# Patient Record
Sex: FEMALE | Race: WHITE | Employment: FULL TIME | URBAN - NONMETROPOLITAN AREA
[De-identification: names, ages, dates, MRNs, and addresses within clinical notes are randomized per-mention and may not be internally consistent; named-entity substitution may affect disease eponyms.]

---

## 2017-04-08 DIAGNOSIS — D50.0 IRON DEFICIENCY ANEMIA DUE TO CHRONIC BLOOD LOSS: ICD-10-CM

## 2017-04-10 RX ORDER — ESOMEPRAZOLE MAGNESIUM 40 MG/1
CAPSULE, DELAYED RELEASE ORAL
Qty: 30 CAPSULE | Refills: 5 | Status: SHIPPED | OUTPATIENT
Start: 2017-04-10 | End: 2017-04-12 | Stop reason: SDUPTHER

## 2017-04-12 ENCOUNTER — OFFICE VISIT (OUTPATIENT)
Dept: FAMILY MEDICINE CLINIC | Age: 52
End: 2017-04-12
Payer: COMMERCIAL

## 2017-04-12 VITALS
BODY MASS INDEX: 28.7 KG/M2 | HEART RATE: 80 BPM | DIASTOLIC BLOOD PRESSURE: 78 MMHG | HEIGHT: 63 IN | SYSTOLIC BLOOD PRESSURE: 118 MMHG | WEIGHT: 162 LBS | RESPIRATION RATE: 16 BRPM

## 2017-04-12 DIAGNOSIS — K21.9 GASTROESOPHAGEAL REFLUX DISEASE WITHOUT ESOPHAGITIS: Primary | ICD-10-CM

## 2017-04-12 DIAGNOSIS — Z13.9 SCREENING FOR CONDITION: ICD-10-CM

## 2017-04-12 DIAGNOSIS — Z13.9 SCREENING: ICD-10-CM

## 2017-04-12 DIAGNOSIS — B00.1 HERPES SIMPLEX LABIALIS: ICD-10-CM

## 2017-04-12 DIAGNOSIS — D50.0 IRON DEFICIENCY ANEMIA DUE TO CHRONIC BLOOD LOSS: ICD-10-CM

## 2017-04-12 PROCEDURE — 99213 OFFICE O/P EST LOW 20 MIN: CPT | Performed by: PHYSICIAN ASSISTANT

## 2017-04-12 RX ORDER — ESOMEPRAZOLE MAGNESIUM 40 MG/1
CAPSULE, DELAYED RELEASE ORAL
Qty: 30 CAPSULE | Refills: 5 | Status: SHIPPED | OUTPATIENT
Start: 2017-04-12 | End: 2018-06-05

## 2017-04-12 ASSESSMENT — ENCOUNTER SYMPTOMS
GASTROINTESTINAL NEGATIVE: 1
RESPIRATORY NEGATIVE: 1

## 2017-04-25 ASSESSMENT — ENCOUNTER SYMPTOMS
CHEST TIGHTNESS: 0
NAUSEA: 0
DIARRHEA: 0
COUGH: 0
RHINORRHEA: 0
CONSTIPATION: 0
VOMITING: 0
SINUS PRESSURE: 0
SHORTNESS OF BREATH: 0
SORE THROAT: 0
WHEEZING: 0

## 2017-04-28 DIAGNOSIS — E78.5 HYPERLIPIDEMIA, UNSPECIFIED HYPERLIPIDEMIA TYPE: Primary | ICD-10-CM

## 2017-08-24 ENCOUNTER — HOSPITAL ENCOUNTER (OUTPATIENT)
Dept: BONE DENSITY | Age: 52
Discharge: HOME | End: 2017-08-24
Payer: COMMERCIAL

## 2017-08-24 ENCOUNTER — HOSPITAL ENCOUNTER (OUTPATIENT)
Dept: MAMMOGRAPHY | Age: 52
Discharge: HOME | End: 2017-08-24
Payer: COMMERCIAL

## 2017-08-24 DIAGNOSIS — Z13.9 SCREENING FOR CONDITION: ICD-10-CM

## 2017-08-24 PROCEDURE — G0202 SCR MAMMO BI INCL CAD: HCPCS

## 2017-08-24 PROCEDURE — 77080 DXA BONE DENSITY AXIAL: CPT

## 2018-05-01 ENCOUNTER — APPOINTMENT (OUTPATIENT)
Dept: GENERAL RADIOLOGY | Age: 53
End: 2018-05-01
Payer: COMMERCIAL

## 2018-05-01 ENCOUNTER — TELEPHONE (OUTPATIENT)
Dept: FAMILY MEDICINE CLINIC | Age: 53
End: 2018-05-01

## 2018-05-01 ENCOUNTER — HOSPITAL ENCOUNTER (EMERGENCY)
Age: 53
Discharge: HOME | End: 2018-05-01
Attending: EMERGENCY MEDICINE
Payer: COMMERCIAL

## 2018-05-01 VITALS
WEIGHT: 155 LBS | RESPIRATION RATE: 12 BRPM | SYSTOLIC BLOOD PRESSURE: 155 MMHG | DIASTOLIC BLOOD PRESSURE: 72 MMHG | BODY MASS INDEX: 27.46 KG/M2 | TEMPERATURE: 98.2 F | OXYGEN SATURATION: 98 % | HEART RATE: 75 BPM

## 2018-05-01 DIAGNOSIS — R00.2 PALPITATIONS: Primary | ICD-10-CM

## 2018-05-01 LAB
ABSOLUTE EOS #: 0.2 K/UL (ref 0–0.4)
ABSOLUTE IMMATURE GRANULOCYTE: NORMAL K/UL (ref 0–0.3)
ABSOLUTE LYMPH #: 1.5 K/UL (ref 1–4.8)
ABSOLUTE MONO #: 0.3 K/UL (ref 0.1–1.2)
ANION GAP SERPL CALCULATED.3IONS-SCNC: 13 MMOL/L (ref 9–17)
BASOPHILS # BLD: 0 % (ref 0–1)
BASOPHILS ABSOLUTE: 0 K/UL (ref 0–0.2)
BUN BLDV-MCNC: 14 MG/DL (ref 6–20)
BUN/CREAT BLD: 24 (ref 9–20)
CALCIUM SERPL-MCNC: 10.6 MG/DL (ref 8.6–10.4)
CHLORIDE BLD-SCNC: 99 MMOL/L (ref 98–107)
CO2: 28 MMOL/L (ref 20–31)
CREAT SERPL-MCNC: 0.58 MG/DL (ref 0.5–0.9)
D DIMER: <100 NG/ML
DIFFERENTIAL TYPE: NORMAL
EKG ATRIAL RATE: 66 BPM
EKG P AXIS: 67 DEGREES
EKG P-R INTERVAL: 156 MS
EKG Q-T INTERVAL: 426 MS
EKG QRS DURATION: 98 MS
EKG QTC CALCULATION (BAZETT): 446 MS
EKG R AXIS: 16 DEGREES
EKG T AXIS: 40 DEGREES
EKG VENTRICULAR RATE: 66 BPM
EOSINOPHILS RELATIVE PERCENT: 3 % (ref 1–7)
GFR AFRICAN AMERICAN: >60 ML/MIN
GFR NON-AFRICAN AMERICAN: >60 ML/MIN
GFR SERPL CREATININE-BSD FRML MDRD: ABNORMAL ML/MIN/{1.73_M2}
GFR SERPL CREATININE-BSD FRML MDRD: ABNORMAL ML/MIN/{1.73_M2}
GLUCOSE BLD-MCNC: 94 MG/DL (ref 70–99)
HCT VFR BLD CALC: 44.7 % (ref 36–46)
HEMOGLOBIN: 15.1 G/DL (ref 12–16)
IMMATURE GRANULOCYTES: NORMAL %
INR BLD: 1
LYMPHOCYTES # BLD: 25 % (ref 16–46)
MCH RBC QN AUTO: 31.1 PG (ref 26–34)
MCHC RBC AUTO-ENTMCNC: 33.8 G/DL (ref 31–37)
MCV RBC AUTO: 92 FL (ref 80–100)
MONOCYTES # BLD: 6 % (ref 4–11)
MYOGLOBIN: <21 NG/ML (ref 25–58)
NRBC AUTOMATED: NORMAL PER 100 WBC
PDW BLD-RTO: 13.5 % (ref 11–14.5)
PLATELET # BLD: 173 K/UL (ref 140–450)
PLATELET ESTIMATE: NORMAL
PMV BLD AUTO: 9.7 FL (ref 6–12)
POTASSIUM SERPL-SCNC: 3.6 MMOL/L (ref 3.7–5.3)
PROTHROMBIN TIME: 10.3 SEC (ref 9.4–11.3)
RBC # BLD: 4.86 M/UL (ref 4–5.2)
RBC # BLD: NORMAL 10*6/UL
SEG NEUTROPHILS: 66 % (ref 43–77)
SEGMENTED NEUTROPHILS ABSOLUTE COUNT: 4 K/UL (ref 1.8–7.7)
SODIUM BLD-SCNC: 140 MMOL/L (ref 135–144)
TROPONIN INTERP: NORMAL
TROPONIN T: <0.03 NG/ML
TSH SERPL DL<=0.05 MIU/L-ACNC: 1.54 MIU/L (ref 0.3–5)
WBC # BLD: 6.1 K/UL (ref 3.5–11)
WBC # BLD: NORMAL 10*3/UL

## 2018-05-01 PROCEDURE — 85025 COMPLETE CBC W/AUTO DIFF WBC: CPT

## 2018-05-01 PROCEDURE — 71046 X-RAY EXAM CHEST 2 VIEWS: CPT

## 2018-05-01 PROCEDURE — 84484 ASSAY OF TROPONIN QUANT: CPT

## 2018-05-01 PROCEDURE — 93005 ELECTROCARDIOGRAM TRACING: CPT

## 2018-05-01 PROCEDURE — 85610 PROTHROMBIN TIME: CPT

## 2018-05-01 PROCEDURE — 85379 FIBRIN DEGRADATION QUANT: CPT

## 2018-05-01 PROCEDURE — 83874 ASSAY OF MYOGLOBIN: CPT

## 2018-05-01 PROCEDURE — 99285 EMERGENCY DEPT VISIT HI MDM: CPT

## 2018-05-01 PROCEDURE — 80048 BASIC METABOLIC PNL TOTAL CA: CPT

## 2018-05-01 PROCEDURE — 84443 ASSAY THYROID STIM HORMONE: CPT

## 2018-05-01 ASSESSMENT — ENCOUNTER SYMPTOMS
CONSTIPATION: 0
COUGH: 0
BACK PAIN: 0
DIARRHEA: 0
BLOOD IN STOOL: 0
SHORTNESS OF BREATH: 0
ABDOMINAL PAIN: 0
NAUSEA: 0
VOMITING: 0
EYE PAIN: 0

## 2018-06-05 ENCOUNTER — OFFICE VISIT (OUTPATIENT)
Dept: CARDIOLOGY | Age: 53
End: 2018-06-05
Payer: COMMERCIAL

## 2018-06-05 VITALS
HEART RATE: 80 BPM | HEIGHT: 63 IN | WEIGHT: 155 LBS | DIASTOLIC BLOOD PRESSURE: 80 MMHG | SYSTOLIC BLOOD PRESSURE: 120 MMHG | BODY MASS INDEX: 27.46 KG/M2

## 2018-06-05 DIAGNOSIS — R00.2 PALPITATIONS: Primary | ICD-10-CM

## 2018-06-05 PROCEDURE — 99213 OFFICE O/P EST LOW 20 MIN: CPT | Performed by: INTERNAL MEDICINE

## 2018-06-05 RX ORDER — M-VIT,TX,IRON,MINS/CALC/FOLIC 27MG-0.4MG
1 TABLET ORAL DAILY
COMMUNITY
End: 2022-07-20

## 2018-06-26 ENCOUNTER — HOSPITAL ENCOUNTER (OUTPATIENT)
Dept: NON INVASIVE DIAGNOSTICS | Age: 53
Discharge: HOME | End: 2018-06-26
Payer: COMMERCIAL

## 2018-06-26 DIAGNOSIS — R00.2 PALPITATIONS: ICD-10-CM

## 2018-06-26 PROCEDURE — 93225 XTRNL ECG REC<48 HRS REC: CPT

## 2018-06-26 PROCEDURE — 93226 XTRNL ECG REC<48 HR SCAN A/R: CPT

## 2018-06-28 ENCOUNTER — HOSPITAL ENCOUNTER (OUTPATIENT)
Dept: NON INVASIVE DIAGNOSTICS | Age: 53
Discharge: HOME | End: 2018-06-28
Payer: COMMERCIAL

## 2018-08-15 ENCOUNTER — OFFICE VISIT (OUTPATIENT)
Dept: CARDIOLOGY | Age: 53
End: 2018-08-15
Payer: COMMERCIAL

## 2018-08-15 VITALS
DIASTOLIC BLOOD PRESSURE: 80 MMHG | BODY MASS INDEX: 28 KG/M2 | WEIGHT: 158 LBS | HEART RATE: 88 BPM | HEIGHT: 63 IN | SYSTOLIC BLOOD PRESSURE: 124 MMHG

## 2018-08-15 DIAGNOSIS — R00.2 PALPITATIONS: Primary | ICD-10-CM

## 2018-08-15 PROCEDURE — 99212 OFFICE O/P EST SF 10 MIN: CPT | Performed by: INTERNAL MEDICINE

## 2018-08-15 NOTE — PROGRESS NOTES
04/27/2017    [address] 136 12/07/2015    TRIG 70 04/27/2017       IMPRESSION:    Patient Active Problem List   Diagnosis    Acid reflux    Hx of migraine headaches    Menorrhagia     48 Hour Holter monitor:  NSR with sinus tachycardia and sinus bradycardia. Rare PACs and rare PVCs. Assessment / Acute Cardiac Problems:     1-Palpitations with negative Holter monitor as above  2-No risk factors for CAD  3-Excellent functional capacity exercises regularely  4-Normal Echo in the past.    Plan of Treatment:     1-No need to add BB since her symptoms have improved. 2-Continue diet and exercise. 3-Reduce stress  4-F/U on an annual basis.       Electronically signed by Kena Blanco MD on 8/15/2018 at 11:37 [facility]  [phone]

## 2018-08-16 ENCOUNTER — OFFICE VISIT (OUTPATIENT)
Dept: PRIMARY CARE CLINIC | Age: 53
End: 2018-08-16
Payer: COMMERCIAL

## 2018-08-16 ENCOUNTER — HOSPITAL ENCOUNTER (OUTPATIENT)
Age: 53
Setting detail: SPECIMEN
Discharge: HOME | End: 2018-08-16
Payer: COMMERCIAL

## 2018-08-16 VITALS
HEIGHT: 63 IN | HEART RATE: 64 BPM | DIASTOLIC BLOOD PRESSURE: 80 MMHG | TEMPERATURE: 97.9 F | BODY MASS INDEX: 27.46 KG/M2 | OXYGEN SATURATION: 98 % | SYSTOLIC BLOOD PRESSURE: 120 MMHG | WEIGHT: 155 LBS

## 2018-08-16 DIAGNOSIS — R30.0 DYSURIA: Primary | ICD-10-CM

## 2018-08-16 DIAGNOSIS — N39.0 URINARY TRACT INFECTION WITHOUT HEMATURIA, SITE UNSPECIFIED: ICD-10-CM

## 2018-08-16 DIAGNOSIS — R30.0 DYSURIA: ICD-10-CM

## 2018-08-16 LAB
-: ABNORMAL
AMORPHOUS: ABNORMAL
BACTERIA: ABNORMAL
BILIRUBIN URINE: NEGATIVE
CASTS UA: ABNORMAL /LPF (ref 0–2)
COLOR: ABNORMAL
COMMENT UA: ABNORMAL
CRYSTALS, UA: ABNORMAL /HPF
EPITHELIAL CELLS UA: ABNORMAL /HPF (ref 0–5)
GLUCOSE URINE: ABNORMAL
KETONES, URINE: NEGATIVE
LEUKOCYTE ESTERASE, URINE: ABNORMAL
MUCUS: ABNORMAL
NITRITE, URINE: POSITIVE
OTHER OBSERVATIONS UA: ABNORMAL
PH UA: 6.5 (ref 5–6)
PROTEIN UA: NEGATIVE
RBC UA: ABNORMAL /HPF (ref 0–4)
RENAL EPITHELIAL, UA: ABNORMAL /HPF
SPECIFIC GRAVITY UA: 1.01 (ref 1.01–1.02)
TRICHOMONAS: ABNORMAL
TURBIDITY: ABNORMAL
URINE HGB: NEGATIVE
UROBILINOGEN, URINE: NORMAL
WBC UA: ABNORMAL /HPF (ref 0–4)
YEAST: ABNORMAL

## 2018-08-16 PROCEDURE — 81001 URINALYSIS AUTO W/SCOPE: CPT

## 2018-08-16 PROCEDURE — 99213 OFFICE O/P EST LOW 20 MIN: CPT | Performed by: NURSE PRACTITIONER

## 2018-08-16 PROCEDURE — 87086 URINE CULTURE/COLONY COUNT: CPT

## 2018-08-16 RX ORDER — SULFAMETHOXAZOLE AND TRIMETHOPRIM 800; 160 MG/1; MG/1
1 TABLET ORAL 2 TIMES DAILY
Qty: 20 TABLET | Refills: 0 | Status: SHIPPED | OUTPATIENT
Start: 2018-08-16 | End: 2018-08-23

## 2018-08-16 ASSESSMENT — PATIENT HEALTH QUESTIONNAIRE - PHQ9
SUM OF ALL RESPONSES TO PHQ QUESTIONS 1-9: 0
1. LITTLE INTEREST OR PLEASURE IN DOING THINGS: 0
SUM OF ALL RESPONSES TO PHQ9 QUESTIONS 1 & 2: 0
2. FEELING DOWN, DEPRESSED OR HOPELESS: 0
SUM OF ALL RESPONSES TO PHQ QUESTIONS 1-9: 0

## 2018-08-16 ASSESSMENT — ENCOUNTER SYMPTOMS: RESPIRATORY NEGATIVE: 1

## 2018-08-16 NOTE — PATIENT INSTRUCTIONS
Patient Education        Urinary Tract Infection in Women: Care Instructions  Your Care Instructions    A urinary tract infection, or UTI, is a general term for an infection anywhere between the kidneys and the urethra (where urine comes out). Most UTIs are bladder infections. They often cause pain or burning when you urinate. UTIs are caused by bacteria and can be cured with antibiotics. Be sure to complete your treatment so that the infection goes away. Follow-up care is a key part of your treatment and safety. Be sure to make and go to all appointments, and call your doctor if you are having problems. It's also a good idea to know your test results and keep a list of the medicines you take. How can you care for yourself at home? · Take your antibiotics as directed. Do not stop taking them just because you feel better. You need to take the full course of antibiotics. · Drink extra water and other fluids for the next day or two. This may help wash out the bacteria that are causing the infection. (If you have kidney, heart, or liver disease and have to limit fluids, talk with your doctor before you increase your fluid intake.)  · Avoid drinks that are carbonated or have caffeine. They can irritate the bladder. · Urinate often. Try to empty your bladder each time. · To relieve pain, take a hot bath or lay a heating pad set on low over your lower belly or genital area. Never go to sleep with a heating pad in place. To prevent UTIs  · Drink plenty of water each day. This helps you urinate often, which clears bacteria from your system. (If you have kidney, heart, or liver disease and have to limit fluids, talk with your doctor before you increase your fluid intake.)  · Urinate when you need to. · Urinate right after you have sex. · Change sanitary pads often. · Avoid douches, bubble baths, feminine hygiene sprays, and other feminine hygiene products that have deodorants.   · After going to the bathroom, wipe from front to back. When should you call for help? Call your doctor now or seek immediate medical care if:    · Symptoms such as fever, chills, nausea, or vomiting get worse or appear for the first time.     · You have new pain in your back just below your rib cage. This is called flank pain.     · There is new blood or pus in your urine.     · You have any problems with your antibiotic medicine.    Watch closely for changes in your health, and be sure to contact your doctor if:    · You are not getting better after taking an antibiotic for 2 days.     · Your symptoms go away but then come back. Where can you learn more? Go to https://Star AnalyticspeSinbad's supply chaineb.UQM Technologies. org and sign in to your UniQure account. Enter G069 in the [employer] box to learn more about \"Urinary Tract Infection in Women: Care Instructions. \"     If you do not have an account, please click on the \"Sign Up Now\" link. Current as of: May 12, 2017  Content Version: 11.7  © 6525-4351 [employer]. Care instructions adapted under license by [facility] ([facility]). If you have questions about a medical condition or this instruction, always ask your healthcare professional. [place] [address] any warranty or liability for your use of this information.

## 2018-08-16 NOTE — PROGRESS NOTES
0.70 04/27/2017    ALKPHOS 94 04/27/2017    AST 17 04/27/2017    ALT 13 04/27/2017    LABGLOM >60 05/01/2018    GFRAA >60 05/01/2018       Outpatient Encounter Prescriptions as of 8/16/2018   Medication Sig Dispense Refill    sulfamethoxazole-trimethoprim (BACTRIM DS) 800-160 MG per tablet Take 1 tablet by mouth 2 times daily for 7 days 20 tablet 0    Multiple Vitamins-Minerals (THERAPEUTIC MULTIVITAMIN-MINERALS) tablet Take 1 tablet by mouth daily      Biotin w/ Vitamins C & E (HAIR/SKIN/NAILS PO) Take by mouth daily       No facility-administered encounter medications on file as of 8/16/2018. Review of Systems   Constitutional: Negative. HENT: Negative. Respiratory: Negative. Cardiovascular: Negative. Genitourinary: Positive for dysuria. Negative for flank pain and hematuria. Skin: Negative. Neurological: Negative. Endo/Heme/Allergies: Negative. Physical Exam   Constitutional: She is oriented to person, place, and time. She appears well-developed and well-nourished. Neck: Normal range of motion. No thyromegaly present. Cardiovascular: Normal rate, regular rhythm and normal heart sounds. Pulmonary/Chest: Effort normal and breath sounds normal.   Abdominal: Soft. There is no tenderness. Musculoskeletal: Normal range of motion. Neurological: She is alert and oriented to person, place, and time. Skin: Skin is warm and dry. Psychiatric: She has a normal mood and affect. no flank pain    Data reviewed:      ASSESSMENT:   Diagnosis Orders   1. Dysuria  Urinalysis Reflex to Culture   2. Urinary tract infection without hematuria, site unspecified         PLAN:  Return if symptoms worsen or fail to improve.     Orders Placed This Encounter   Medications    sulfamethoxazole-trimethoprim (BACTRIM DS) 800-160 MG per tablet     Sig: Take 1 tablet by mouth 2 times daily for 7 days     Dispense:  20 tablet     Refill:  0     Patient given educational materials - see patient instructions. Discussed use, benefit, and side effects of prescribed medications. All patient questions answered. Pt voiced understanding. Patient agreed with treatment plan. Follow up as directed.        Electronically signed by Jeraldine Halsted, APRN - CNP on 8/16/18 at 9:50 AM

## 2018-08-17 LAB
CULTURE: NORMAL
Lab: NORMAL
SPECIMEN DESCRIPTION: NORMAL
STATUS: NORMAL

## 2018-08-18 ENCOUNTER — TELEPHONE (OUTPATIENT)
Dept: PRIMARY CARE CLINIC | Age: 53
End: 2018-08-18

## 2018-08-18 NOTE — TELEPHONE ENCOUNTER
Left voicemail to continue bactrim, increase water intake, and follow up with pcp as needed. Gave phone number for return call/questions.

## 2019-01-03 ENCOUNTER — HOSPITAL ENCOUNTER (OUTPATIENT)
Dept: GENERAL RADIOLOGY | Age: 54
Discharge: HOME | End: 2019-01-05
Payer: COMMERCIAL

## 2019-01-03 ENCOUNTER — HOSPITAL ENCOUNTER (OUTPATIENT)
Dept: LAB | Age: 54
Discharge: HOME | End: 2019-01-03
Payer: COMMERCIAL

## 2019-01-03 ENCOUNTER — OFFICE VISIT (OUTPATIENT)
Dept: FAMILY MEDICINE CLINIC | Age: 54
End: 2019-01-03
Payer: COMMERCIAL

## 2019-01-03 VITALS
BODY MASS INDEX: 28.7 KG/M2 | SYSTOLIC BLOOD PRESSURE: 118 MMHG | HEART RATE: 98 BPM | WEIGHT: 162 LBS | OXYGEN SATURATION: 99 % | HEIGHT: 63 IN | DIASTOLIC BLOOD PRESSURE: 78 MMHG

## 2019-01-03 DIAGNOSIS — R10.11 RUQ ABDOMINAL PAIN: ICD-10-CM

## 2019-01-03 DIAGNOSIS — R10.11 RUQ ABDOMINAL PAIN: Primary | ICD-10-CM

## 2019-01-03 LAB
ABSOLUTE EOS #: 0.2 K/UL (ref 0–0.4)
ABSOLUTE IMMATURE GRANULOCYTE: NORMAL K/UL (ref 0–0.3)
ABSOLUTE LYMPH #: 2 K/UL (ref 1–4.8)
ABSOLUTE MONO #: 0.4 K/UL (ref 0.1–1.2)
ALBUMIN SERPL-MCNC: 4.8 G/DL (ref 3.5–5.2)
ALBUMIN/GLOBULIN RATIO: 1.9 (ref 1–2.5)
ALP BLD-CCNC: 77 U/L (ref 35–104)
ALT SERPL-CCNC: 18 U/L (ref 5–33)
AMYLASE: 73 U/L (ref 28–100)
ANION GAP SERPL CALCULATED.3IONS-SCNC: 12 MMOL/L (ref 9–17)
AST SERPL-CCNC: 21 U/L
BASOPHILS # BLD: 1 % (ref 0–1)
BASOPHILS ABSOLUTE: 0 K/UL (ref 0–0.2)
BILIRUB SERPL-MCNC: 0.45 MG/DL (ref 0.3–1.2)
BUN BLDV-MCNC: 11 MG/DL (ref 6–20)
BUN/CREAT BLD: 18 (ref 9–20)
CALCIUM SERPL-MCNC: 9.9 MG/DL (ref 8.6–10.4)
CHLORIDE BLD-SCNC: 103 MMOL/L (ref 98–107)
CO2: 27 MMOL/L (ref 20–31)
CREAT SERPL-MCNC: 0.6 MG/DL (ref 0.5–0.9)
DIFFERENTIAL TYPE: NORMAL
EOSINOPHILS RELATIVE PERCENT: 4 % (ref 1–7)
GFR AFRICAN AMERICAN: >60 ML/MIN
GFR NON-AFRICAN AMERICAN: >60 ML/MIN
GFR SERPL CREATININE-BSD FRML MDRD: NORMAL ML/MIN/{1.73_M2}
GFR SERPL CREATININE-BSD FRML MDRD: NORMAL ML/MIN/{1.73_M2}
GLUCOSE BLD-MCNC: 83 MG/DL (ref 70–99)
HCT VFR BLD CALC: 44.4 % (ref 36–46)
HEMOGLOBIN: 14.7 G/DL (ref 12–16)
IMMATURE GRANULOCYTES: NORMAL %
LIPASE: 55 U/L (ref 13–60)
LYMPHOCYTES # BLD: 36 % (ref 16–46)
MCH RBC QN AUTO: 30.9 PG (ref 26–34)
MCHC RBC AUTO-ENTMCNC: 33.2 G/DL (ref 31–37)
MCV RBC AUTO: 93.3 FL (ref 80–100)
MONOCYTES # BLD: 7 % (ref 4–11)
NRBC AUTOMATED: NORMAL PER 100 WBC
PDW BLD-RTO: 13.1 % (ref 11–14.5)
PLATELET # BLD: 176 K/UL (ref 140–450)
PLATELET ESTIMATE: NORMAL
PMV BLD AUTO: 10.4 FL (ref 6–12)
POTASSIUM SERPL-SCNC: 4.5 MMOL/L (ref 3.7–5.3)
RBC # BLD: 4.76 M/UL (ref 4–5.2)
RBC # BLD: NORMAL 10*6/UL
SEG NEUTROPHILS: 52 % (ref 43–77)
SEGMENTED NEUTROPHILS ABSOLUTE COUNT: 2.9 K/UL (ref 1.8–7.7)
SODIUM BLD-SCNC: 142 MMOL/L (ref 135–144)
TOTAL PROTEIN: 7.3 G/DL (ref 6.4–8.3)
WBC # BLD: 5.6 K/UL (ref 3.5–11)
WBC # BLD: NORMAL 10*3/UL

## 2019-01-03 PROCEDURE — 85025 COMPLETE CBC W/AUTO DIFF WBC: CPT

## 2019-01-03 PROCEDURE — 99214 OFFICE O/P EST MOD 30 MIN: CPT | Performed by: PHYSICIAN ASSISTANT

## 2019-01-03 PROCEDURE — 82150 ASSAY OF AMYLASE: CPT

## 2019-01-03 PROCEDURE — 36415 COLL VENOUS BLD VENIPUNCTURE: CPT

## 2019-01-03 PROCEDURE — 83690 ASSAY OF LIPASE: CPT

## 2019-01-03 PROCEDURE — 80053 COMPREHEN METABOLIC PANEL: CPT

## 2019-01-03 PROCEDURE — 74019 RADEX ABDOMEN 2 VIEWS: CPT

## 2019-01-03 ASSESSMENT — ENCOUNTER SYMPTOMS
VOMITING: 0
ABDOMINAL PAIN: 1
ABDOMINAL DISTENTION: 1
BACK PAIN: 1
BLOOD IN STOOL: 0
RESPIRATORY NEGATIVE: 1
NAUSEA: 1
DIARRHEA: 0
CONSTIPATION: 1

## 2019-01-14 ENCOUNTER — OFFICE VISIT (OUTPATIENT)
Dept: FAMILY MEDICINE CLINIC | Age: 54
End: 2019-01-14
Payer: COMMERCIAL

## 2019-01-14 VITALS
HEIGHT: 63 IN | WEIGHT: 160 LBS | SYSTOLIC BLOOD PRESSURE: 110 MMHG | HEART RATE: 73 BPM | OXYGEN SATURATION: 98 % | BODY MASS INDEX: 28.35 KG/M2 | DIASTOLIC BLOOD PRESSURE: 70 MMHG

## 2019-01-14 DIAGNOSIS — K21.9 GASTROESOPHAGEAL REFLUX DISEASE WITHOUT ESOPHAGITIS: ICD-10-CM

## 2019-01-14 DIAGNOSIS — R05.9 COUGH: Primary | ICD-10-CM

## 2019-01-14 DIAGNOSIS — J06.9 VIRAL URI: ICD-10-CM

## 2019-01-14 LAB
INFLUENZA A ANTIBODY: NORMAL
INFLUENZA B ANTIBODY: NORMAL

## 2019-01-14 PROCEDURE — 87804 INFLUENZA ASSAY W/OPTIC: CPT | Performed by: PHYSICIAN ASSISTANT

## 2019-01-14 PROCEDURE — 99213 OFFICE O/P EST LOW 20 MIN: CPT | Performed by: PHYSICIAN ASSISTANT

## 2019-01-14 RX ORDER — BENZONATATE 200 MG/1
200 CAPSULE ORAL 3 TIMES DAILY PRN
Qty: 30 CAPSULE | Refills: 1 | Status: SHIPPED | OUTPATIENT
Start: 2019-01-14 | End: 2019-01-21

## 2019-01-14 RX ORDER — ESOMEPRAZOLE MAGNESIUM 40 MG/1
40 CAPSULE, DELAYED RELEASE ORAL DAILY
Qty: 30 CAPSULE | Refills: 2 | Status: SHIPPED | OUTPATIENT
Start: 2019-01-14 | End: 2019-06-22 | Stop reason: SDUPTHER

## 2019-01-14 ASSESSMENT — ENCOUNTER SYMPTOMS
CHEST TIGHTNESS: 0
GASTROINTESTINAL NEGATIVE: 1
RHINORRHEA: 1
SORE THROAT: 0
WHEEZING: 0
SINUS PAIN: 0
COUGH: 1
SINUS PRESSURE: 0

## 2019-02-06 ENCOUNTER — HOSPITAL ENCOUNTER (OUTPATIENT)
Age: 54
Setting detail: SPECIMEN
Discharge: HOME | End: 2019-02-06
Payer: COMMERCIAL

## 2019-02-06 ENCOUNTER — OFFICE VISIT (OUTPATIENT)
Dept: FAMILY MEDICINE CLINIC | Age: 54
End: 2019-02-06
Payer: COMMERCIAL

## 2019-02-06 VITALS
SYSTOLIC BLOOD PRESSURE: 116 MMHG | DIASTOLIC BLOOD PRESSURE: 74 MMHG | WEIGHT: 165.8 LBS | HEIGHT: 63 IN | HEART RATE: 76 BPM | OXYGEN SATURATION: 98 % | BODY MASS INDEX: 29.38 KG/M2

## 2019-02-06 DIAGNOSIS — Z12.4 PAP SMEAR FOR CERVICAL CANCER SCREENING: ICD-10-CM

## 2019-02-06 DIAGNOSIS — Z12.11 SCREEN FOR COLON CANCER: ICD-10-CM

## 2019-02-06 DIAGNOSIS — K21.9 GASTROESOPHAGEAL REFLUX DISEASE WITHOUT ESOPHAGITIS: ICD-10-CM

## 2019-02-06 DIAGNOSIS — Z13.9 SCREENING FOR CONDITION: Primary | ICD-10-CM

## 2019-02-06 DIAGNOSIS — E55.9 VITAMIN D DEFICIENCY: ICD-10-CM

## 2019-02-06 DIAGNOSIS — E78.00 PURE HYPERCHOLESTEROLEMIA: ICD-10-CM

## 2019-02-06 PROCEDURE — 87624 HPV HI-RISK TYP POOLED RSLT: CPT

## 2019-02-06 PROCEDURE — 99396 PREV VISIT EST AGE 40-64: CPT | Performed by: PHYSICIAN ASSISTANT

## 2019-02-06 PROCEDURE — G0145 SCR C/V CYTO,THINLAYER,RESCR: HCPCS

## 2019-02-06 ASSESSMENT — PATIENT HEALTH QUESTIONNAIRE - PHQ9
SUM OF ALL RESPONSES TO PHQ QUESTIONS 1-9: 0
SUM OF ALL RESPONSES TO PHQ QUESTIONS 1-9: 0
1. LITTLE INTEREST OR PLEASURE IN DOING THINGS: 0
2. FEELING DOWN, DEPRESSED OR HOPELESS: 0
SUM OF ALL RESPONSES TO PHQ9 QUESTIONS 1 & 2: 0

## 2019-02-06 ASSESSMENT — ENCOUNTER SYMPTOMS
RESPIRATORY NEGATIVE: 1
BLOOD IN STOOL: 0
ABDOMINAL DISTENTION: 1

## 2019-02-07 LAB — COMMENT: NORMAL

## 2019-02-08 LAB
HPV SAMPLE: NORMAL
HPV SOURCE: NORMAL
HPV, GENOTYPE 16: NOT DETECTED
HPV, GENOTYPE 18: NOT DETECTED
HPV, HIGH RISK OTHER: NOT DETECTED
HPV, INTERPRETATION: NORMAL

## 2019-02-09 ASSESSMENT — ENCOUNTER SYMPTOMS
COUGH: 0
NAUSEA: 0
TROUBLE SWALLOWING: 0
DIARRHEA: 0
CHEST TIGHTNESS: 0
SHORTNESS OF BREATH: 0
SORE THROAT: 0
CONSTIPATION: 0
SINUS PAIN: 0
WHEEZING: 0
SINUS PRESSURE: 0
RHINORRHEA: 0
VOMITING: 0

## 2019-02-14 ENCOUNTER — HOSPITAL ENCOUNTER (OUTPATIENT)
Dept: MAMMOGRAPHY | Age: 54
Discharge: HOME | End: 2019-02-16
Payer: COMMERCIAL

## 2019-02-14 DIAGNOSIS — Z13.9 SCREENING FOR CONDITION: ICD-10-CM

## 2019-02-14 PROCEDURE — 77063 BREAST TOMOSYNTHESIS BI: CPT

## 2019-02-19 ENCOUNTER — TELEPHONE (OUTPATIENT)
Dept: FAMILY MEDICINE CLINIC | Age: 54
End: 2019-02-19

## 2019-02-19 LAB — CYTOLOGY REPORT: NORMAL

## 2019-02-19 NOTE — TELEPHONE ENCOUNTER
Spoke with patient and she is aware of her HPV results and that we will call her when the final pap results are completed.

## 2019-04-04 ENCOUNTER — PATIENT MESSAGE (OUTPATIENT)
Dept: FAMILY MEDICINE CLINIC | Age: 54
End: 2019-04-04

## 2019-04-04 NOTE — TELEPHONE ENCOUNTER
From: Milvia Reynolds  To: MALACHI Pearce  Sent: 4/4/2019 12:41 PM EDT  Subject: Non-Urgent Medical Question    Hello! Hope this email finds you well :)  I am finishing up the generic Nexium this week, as suggested. That would make three months. Just wanted to let you know. Also, I am still doing the Mylax once daily. I stopped once for three days and it was very hard to go, so I am continuing with it. It's been helpful, but I wonder if I need to do this forever and why I need to do this, know what I mean? What is causing this? Suggestions?

## 2019-06-22 DIAGNOSIS — K21.9 GASTROESOPHAGEAL REFLUX DISEASE WITHOUT ESOPHAGITIS: ICD-10-CM

## 2019-06-24 RX ORDER — ESOMEPRAZOLE MAGNESIUM 40 MG/1
CAPSULE, DELAYED RELEASE ORAL
Qty: 30 CAPSULE | Refills: 2 | Status: SHIPPED | OUTPATIENT
Start: 2019-06-24 | End: 2019-08-05 | Stop reason: SDUPTHER

## 2019-08-05 ENCOUNTER — OFFICE VISIT (OUTPATIENT)
Dept: FAMILY MEDICINE CLINIC | Age: 54
End: 2019-08-05
Payer: COMMERCIAL

## 2019-08-05 VITALS
SYSTOLIC BLOOD PRESSURE: 118 MMHG | HEART RATE: 78 BPM | WEIGHT: 161 LBS | BODY MASS INDEX: 28.53 KG/M2 | OXYGEN SATURATION: 97 % | HEIGHT: 63 IN | DIASTOLIC BLOOD PRESSURE: 78 MMHG

## 2019-08-05 DIAGNOSIS — Z78.0 MENOPAUSE: ICD-10-CM

## 2019-08-05 DIAGNOSIS — K21.9 GASTROESOPHAGEAL REFLUX DISEASE WITHOUT ESOPHAGITIS: ICD-10-CM

## 2019-08-05 DIAGNOSIS — Z23 NEED FOR SHINGLES VACCINE: Primary | ICD-10-CM

## 2019-08-05 DIAGNOSIS — K59.04 CHRONIC IDIOPATHIC CONSTIPATION: ICD-10-CM

## 2019-08-05 PROCEDURE — 99214 OFFICE O/P EST MOD 30 MIN: CPT | Performed by: PHYSICIAN ASSISTANT

## 2019-08-05 PROCEDURE — G8427 DOCREV CUR MEDS BY ELIG CLIN: HCPCS | Performed by: PHYSICIAN ASSISTANT

## 2019-08-05 PROCEDURE — 3017F COLORECTAL CA SCREEN DOC REV: CPT | Performed by: PHYSICIAN ASSISTANT

## 2019-08-05 PROCEDURE — G8419 CALC BMI OUT NRM PARAM NOF/U: HCPCS | Performed by: PHYSICIAN ASSISTANT

## 2019-08-05 PROCEDURE — 1036F TOBACCO NON-USER: CPT | Performed by: PHYSICIAN ASSISTANT

## 2019-08-05 RX ORDER — ESOMEPRAZOLE MAGNESIUM 40 MG/1
CAPSULE, DELAYED RELEASE ORAL
Qty: 30 CAPSULE | Refills: 5 | Status: SHIPPED | OUTPATIENT
Start: 2019-08-05 | End: 2021-03-31

## 2019-08-05 ASSESSMENT — ENCOUNTER SYMPTOMS
NAUSEA: 0
VOMITING: 0
WHEEZING: 0
COUGH: 0
DIARRHEA: 0
SHORTNESS OF BREATH: 0
CHEST TIGHTNESS: 0

## 2019-08-05 ASSESSMENT — PATIENT HEALTH QUESTIONNAIRE - PHQ9
SUM OF ALL RESPONSES TO PHQ QUESTIONS 1-9: 0
SUM OF ALL RESPONSES TO PHQ9 QUESTIONS 1 & 2: 0
SUM OF ALL RESPONSES TO PHQ QUESTIONS 1-9: 0
1. LITTLE INTEREST OR PLEASURE IN DOING THINGS: 0
2. FEELING DOWN, DEPRESSED OR HOPELESS: 0

## 2019-08-05 NOTE — PROGRESS NOTES
[facility]    Subjective:      Patient ID: Radha Rodriguez is a 48 y.o. y.o. female. Patient is seen for six month follow up. Denies recent illness or chest sx. Taking Align daily and it really helps her a lot. The first day noted significant change for the better. Had not had a normal BM in years. No significant issues with constipation. No bloating. Past Medical History:   Diagnosis Date    Acid reflux     Anemia     GERD (gastroesophageal reflux disease)     Hx of migraine headaches     Menorrhagia     Dr Rhett Genao follows and w/u        Past Surgical History:   Procedure Laterality Date    BREAST SURGERY Right 2014    right breast biopsy negative ? Dr. Joseluis Barros COLONOSCOPY  12/28/2010    UPPER GASTROINTESTINAL ENDOSCOPY  2005, 2010    neg    WISDOM TOOTH EXTRACTION         Family History   Problem Relation Age of Onset    Other Maternal Grandfather         dementia  bio grandmother    Hypertension Father     Elevated Lipids Father     Cancer Father         Passed at 68    Cancer Brother         kidney    Arthritis Mother         one knee replaced    Cancer Mother         spots removed (lip, chest and shoulder)    High Cholesterol Mother         manages mostly with diet    Miscarriages / Stillbirths Mother         stillbirth at 42 weeks    Other Mother         Stints put in 8 yrs ago   [facility] Heart Disease Mother     High Blood Pressure Mother     Asthma Brother         has inhaler and medication    Heart Disease Paternal Grandfather         Passed at the age of 80    Heart Disease Paternal Grandmother     Other Son         lymphomatoid papullosis seen at [facility] clinic.         No Known Allergies    Current Outpatient Medications   Medication Sig Dispense Refill    esomeprazole (NEXIUM) 40 MG delayed release capsule take 1 capsule by mouth once daily 30 capsule 2    Multiple Vitamins-Minerals (THERAPEUTIC MULTIVITAMIN-MINERALS) questions      Derrick Henderosnma  8/5/2019 2:41 PM    (Pleasenote that portions of this note were completed with a voice recognition program.Efforts were made to edit the dictations but occasionally words are mis-transcribed.)

## 2019-10-14 ENCOUNTER — HOSPITAL ENCOUNTER (OUTPATIENT)
Dept: LAB | Age: 54
Discharge: HOME | End: 2019-10-14
Payer: COMMERCIAL

## 2019-10-14 DIAGNOSIS — Z13.9 SCREENING FOR CONDITION: ICD-10-CM

## 2019-10-14 DIAGNOSIS — E78.00 PURE HYPERCHOLESTEROLEMIA: ICD-10-CM

## 2019-10-14 DIAGNOSIS — E55.9 VITAMIN D DEFICIENCY: ICD-10-CM

## 2019-10-14 LAB
CHOLESTEROL, FASTING: 230 MG/DL
CHOLESTEROL/HDL RATIO: 3.3
HDLC SERPL-MCNC: 69 MG/DL
LDL CHOLESTEROL: 128 MG/DL (ref 0–130)
TRIGLYCERIDE, FASTING: 163 MG/DL
TSH SERPL DL<=0.05 MIU/L-ACNC: 1.63 MIU/L (ref 0.3–5)
VITAMIN D 25-HYDROXY: 35.3 NG/ML (ref 30–100)
VLDLC SERPL CALC-MCNC: ABNORMAL MG/DL (ref 1–30)

## 2019-10-14 PROCEDURE — 84443 ASSAY THYROID STIM HORMONE: CPT

## 2019-10-14 PROCEDURE — 80061 LIPID PANEL: CPT

## 2019-10-14 PROCEDURE — 82306 VITAMIN D 25 HYDROXY: CPT

## 2019-10-14 PROCEDURE — 36415 COLL VENOUS BLD VENIPUNCTURE: CPT

## 2021-03-31 ENCOUNTER — OFFICE VISIT (OUTPATIENT)
Dept: PRIMARY CARE CLINIC | Age: 56
End: 2021-03-31
Payer: COMMERCIAL

## 2021-03-31 ENCOUNTER — HOSPITAL ENCOUNTER (OUTPATIENT)
Age: 56
Setting detail: SPECIMEN
Discharge: HOME | End: 2021-03-31
Payer: COMMERCIAL

## 2021-03-31 VITALS
DIASTOLIC BLOOD PRESSURE: 74 MMHG | SYSTOLIC BLOOD PRESSURE: 114 MMHG | BODY MASS INDEX: 29.06 KG/M2 | HEART RATE: 68 BPM | HEIGHT: 63 IN | WEIGHT: 164 LBS

## 2021-03-31 DIAGNOSIS — R82.71 BACTERIURIA: Primary | ICD-10-CM

## 2021-03-31 DIAGNOSIS — R30.0 DYSURIA: ICD-10-CM

## 2021-03-31 DIAGNOSIS — R82.71 BACTERIURIA: ICD-10-CM

## 2021-03-31 LAB
-: ABNORMAL
AMORPHOUS: ABNORMAL
BACTERIA: ABNORMAL
BILIRUBIN URINE: NEGATIVE
CASTS UA: ABNORMAL /LPF (ref 0–2)
COLOR: ABNORMAL
COMMENT UA: ABNORMAL
CRYSTALS, UA: ABNORMAL /HPF
EPITHELIAL CELLS UA: ABNORMAL /HPF (ref 0–5)
GLUCOSE URINE: NEGATIVE
KETONES, URINE: NEGATIVE
LEUKOCYTE ESTERASE, URINE: ABNORMAL
MUCUS: ABNORMAL
NITRITE, URINE: NEGATIVE
OTHER OBSERVATIONS UA: ABNORMAL
PH UA: 6.5 (ref 5–6)
PROTEIN UA: NEGATIVE
RBC UA: ABNORMAL /HPF (ref 0–4)
RENAL EPITHELIAL, UA: ABNORMAL /HPF
SPECIFIC GRAVITY UA: 1.01 (ref 1.01–1.02)
TRICHOMONAS: ABNORMAL
TURBIDITY: ABNORMAL
URINE HGB: NEGATIVE
UROBILINOGEN, URINE: NORMAL
WBC UA: ABNORMAL /HPF (ref 0–4)
YEAST: ABNORMAL

## 2021-03-31 PROCEDURE — G8484 FLU IMMUNIZE NO ADMIN: HCPCS | Performed by: PHYSICIAN ASSISTANT

## 2021-03-31 PROCEDURE — 81001 URINALYSIS AUTO W/SCOPE: CPT

## 2021-03-31 PROCEDURE — 87086 URINE CULTURE/COLONY COUNT: CPT

## 2021-03-31 PROCEDURE — G8419 CALC BMI OUT NRM PARAM NOF/U: HCPCS | Performed by: PHYSICIAN ASSISTANT

## 2021-03-31 PROCEDURE — 3017F COLORECTAL CA SCREEN DOC REV: CPT | Performed by: PHYSICIAN ASSISTANT

## 2021-03-31 PROCEDURE — 99213 OFFICE O/P EST LOW 20 MIN: CPT | Performed by: PHYSICIAN ASSISTANT

## 2021-03-31 PROCEDURE — 1036F TOBACCO NON-USER: CPT | Performed by: PHYSICIAN ASSISTANT

## 2021-03-31 PROCEDURE — G8427 DOCREV CUR MEDS BY ELIG CLIN: HCPCS | Performed by: PHYSICIAN ASSISTANT

## 2021-03-31 RX ORDER — NITROFURANTOIN 25; 75 MG/1; MG/1
100 CAPSULE ORAL 2 TIMES DAILY
Qty: 10 CAPSULE | Refills: 0 | Status: SHIPPED | OUTPATIENT
Start: 2021-03-31 | End: 2021-04-05

## 2021-03-31 SDOH — ECONOMIC STABILITY: INCOME INSECURITY: HOW HARD IS IT FOR YOU TO PAY FOR THE VERY BASICS LIKE FOOD, HOUSING, MEDICAL CARE, AND HEATING?: NOT HARD AT ALL

## 2021-03-31 SDOH — ECONOMIC STABILITY: FOOD INSECURITY: WITHIN THE PAST 12 MONTHS, YOU WORRIED THAT YOUR FOOD WOULD RUN OUT BEFORE YOU GOT MONEY TO BUY MORE.: NEVER TRUE

## 2021-03-31 ASSESSMENT — PATIENT HEALTH QUESTIONNAIRE - PHQ9
2. FEELING DOWN, DEPRESSED OR HOPELESS: 0
SUM OF ALL RESPONSES TO PHQ9 QUESTIONS 1 & 2: 0
SUM OF ALL RESPONSES TO PHQ QUESTIONS 1-9: 0

## 2021-03-31 ASSESSMENT — ENCOUNTER SYMPTOMS
RESPIRATORY NEGATIVE: 1
GASTROINTESTINAL NEGATIVE: 1

## 2021-03-31 NOTE — PROGRESS NOTES
Normal  Normal Final    Nitrite, Urine 03/31/2021 NEGATIVE  NEGATIVE Final    Leukocyte Esterase, Urine 03/31/2021 1+* NEGATIVE Final    Urinalysis Comments 03/31/2021 NOT REPORTED   Final    - 03/31/2021        Final    WBC, UA 03/31/2021 0 TO 4  0 - 4 /HPF Final    RBC, UA 03/31/2021 0 TO 4  0 - 4 /HPF Final    Casts UA 03/31/2021 NOT REPORTED  0 - 2 /LPF Final    Crystals, UA 03/31/2021 NOT REPORTED  None /HPF Final    Epithelial Cells UA 03/31/2021 0 TO 4  0 - 5 /HPF Final    Renal Epithelial, UA 03/31/2021 NOT REPORTED  0 /HPF Final    Bacteria, UA 03/31/2021 TRACE* None Final    Mucus, UA 03/31/2021 NOT REPORTED  None Final    Trichomonas, UA 03/31/2021 NOT REPORTED  None Final    Amorphous, UA 03/31/2021 NOT REPORTED  None Final    Other Observations UA 03/31/2021 NOT REPORTED  NOT REQ. Final    Yeast, UA 03/31/2021 NOT REPORTED  None Final       Assessment:      1. Bacteriuria    2. Dysuria          Plan:      Culture urine. Macrobid 100 mg bid x 5 days. Push fluids. Supportive care advised. Follow-up PRN/as planned with PCP.         MALACHI Mays

## 2021-04-01 LAB
CULTURE: NORMAL
Lab: NORMAL
SPECIMEN DESCRIPTION: NORMAL

## 2021-04-02 ENCOUNTER — TELEPHONE (OUTPATIENT)
Dept: FAMILY MEDICINE CLINIC | Age: 56
End: 2021-04-02

## 2021-04-02 NOTE — TELEPHONE ENCOUNTER
----- Message from [place], [place] sent at 4/2/2021  8:07 AM EDT -----  No growth on culture. Push fluids.

## 2021-04-02 NOTE — TELEPHONE ENCOUNTER
----- Message from [place], [place] sent at 4/2/2021  8:07 AM EDT -----  No growth on culture. Push fluids.

## 2021-04-19 ENCOUNTER — OFFICE VISIT (OUTPATIENT)
Dept: FAMILY MEDICINE CLINIC | Age: 56
End: 2021-04-19
Payer: COMMERCIAL

## 2021-04-19 VITALS
DIASTOLIC BLOOD PRESSURE: 68 MMHG | BODY MASS INDEX: 28.7 KG/M2 | HEART RATE: 78 BPM | HEIGHT: 63 IN | SYSTOLIC BLOOD PRESSURE: 114 MMHG | WEIGHT: 162 LBS

## 2021-04-19 DIAGNOSIS — Z12.11 COLON CANCER SCREENING: ICD-10-CM

## 2021-04-19 DIAGNOSIS — Z13.220 LIPID SCREENING: ICD-10-CM

## 2021-04-19 DIAGNOSIS — Z00.00 WELL ADULT EXAM: Primary | ICD-10-CM

## 2021-04-19 DIAGNOSIS — Z13.1 DIABETES MELLITUS SCREENING: ICD-10-CM

## 2021-04-19 DIAGNOSIS — Z23 NEED FOR SHINGLES VACCINE: ICD-10-CM

## 2021-04-19 DIAGNOSIS — Z12.31 ENCOUNTER FOR SCREENING MAMMOGRAM FOR BREAST CANCER: ICD-10-CM

## 2021-04-19 PROCEDURE — 99396 PREV VISIT EST AGE 40-64: CPT | Performed by: PHYSICIAN ASSISTANT

## 2021-04-19 RX ORDER — ZOSTER VACCINE RECOMBINANT, ADJUVANTED 50 MCG/0.5
0.5 KIT INTRAMUSCULAR SEE ADMIN INSTRUCTIONS
Qty: 0.5 ML | Refills: 1 | Status: ON HOLD | OUTPATIENT
Start: 2021-04-19 | End: 2021-09-11 | Stop reason: HOSPADM

## 2021-04-19 NOTE — PROGRESS NOTES
CHIEF COMPLAINT  Chief Complaint   Patient presents with    New Patient     Still having pressure in lower adomen/bladder area into the groin area. Mj Cerna is a 54 y.o. female who presents to the office for annual wellness examination and to establish care. Patient says that overall she has been doing well. She has had some pain in the right groin over the past few weeks. She was seen in Urgent Care and treated for bacteruria. Culture was negative. Patient rested from exercise and pain resolved. Patient slowly restarted exercise and now has discomfort in lower groin region again. Patient walks five miles daily and lifts weights as well. Patient is due for laboratory studies and agrees to completing. Patient has received her COVID vaccination. Patient says that overall she is doing well and denies concerns or complaints today. ROS  All other review of systems negative, except for those noted. PAST MEDICAL HISTORY    Past Medical History:   Diagnosis Date    Acid reflux     Anemia     GERD (gastroesophageal reflux disease)     Hx of migraine headaches     Menorrhagia        SURGICAL HISTORY    Past Surgical History:   Procedure Laterality Date    BREAST SURGERY Right 2014    right breast biopsy negative ? Dr. Rylee Mallory COLONOSCOPY  12/28/2010    UPPER GASTROINTESTINAL ENDOSCOPY  2005, 2010    neg    WISDOM TOOTH EXTRACTION         FAMILY HISTORY    Family History   Problem Relation Age of Onset    Other Maternal Grandfather         dementia  bio grandmother    Heart Disease Maternal Grandfather     Hypertension Father     Elevated Lipids Father     Other Father         pulmonary fibrosis    Lung Cancer Father     High Blood Pressure Father     Cancer Brother         kidney    Arthritis Mother         one knee replaced    High Cholesterol Mother         manages mostly with diet    Miscarriages / Stillbirths Mother         stillbirth at 40 weeks    Heart Disease Mother     High Blood Pressure Mother     Cancer Mother         skin CA    Asthma Brother     Other Brother         bicuspid aortic valve    Heart Disease Paternal Grandfather 80    Heart Disease Paternal Grandmother     Other Son         [facility]oid [facility]ullosis [facility] at [facility].  Other Daughter         lymphatoid papulosus       SOCIAL HISTORY    Social History     Socioeconomic History    Marital status:      Spouse name: None    Number of children: None    Years of education: None    Highest education level: None   Occupational History    None   Social Needs    Financial resource strain: Not hard at all   Ankit-Noemy insecurity     Worry: Never true     Inability: Never true   [employer] needs     Medical: None     Non-medical: None   Tobacco Use    Smoking status: Never Smoker    Smokeless tobacco: Never Used   Substance and Sexual Activity    Alcohol use:  Yes     Alcohol/week: 18.0 standard drinks     Types: 18 Cans of beer per week    Drug use: No    Sexual activity: Yes     Partners: Male   Lifestyle    Physical activity     Days per week: None     Minutes per session: None    Stress: None   Relationships    Social connections     Talks on phone: None     Gets together: None     Attends Ep[employer]opalian service: None     Active member of club or organization: None     Attends meetings of clubs or organizations: None     Relationship status: None    Intimate partner violence     Fear of current or ex partner: None     Emotionally abused: None     Physically abused: None     Forced sexual activity: None   Other Topics Concern    None   Social History Narrative    None       MEDICATIONS  Current Outpatient Medications   Medication Sig Dispense Refill    Probiotic Product (PROBIOTIC DAILY PO) Take by mouth      zoster recombinant adjuvanted vaccine (SHINGRIX) 50 MCG/0.5ML SUSR injection Inject 0.5 mLs into the muscle See Admin Instructions 1 dose now and repeat in 2-6 months 0.5 mL 1    Multiple Vitamins-Minerals (THERAPEUTIC MULTIVITAMIN-MINERALS) tablet Take 1 tablet by mouth daily      Biotin w/ Vitamins C & E (HAIR/SKIN/NAILS PO) Take by mouth daily       No current facility-administered medications for this visit. ALLERGIES  No Known Allergies    PHYSICAL EXAM:   Vital Signs: /68 (Site: Right Upper Arm, Position: Sitting, Cuff Size: Medium Adult)   Pulse 78   Ht 5' 3\" (1.6 m)   Wt 162 lb (73.5 kg)   LMP 11/02/2015 (Approximate)   BMI 28.70 kg/m²   Constitutional:  Alert and oriented x 3   HENT:  Normocephalic, Atraumatic, Bilateral external ears normal, Oropharynx moist, No oral exudates, Nose normal. Neck- Normal range of motion, No tenderness, Supple, No stridor. Eyes:  PERRL, Conjunctiva normal, No discharge. Respiratory:  Normal breath sounds, No respiratory distress  Cardiovascular:  Normal heart rate, Normal rhythm  GI:  Bowel sounds normal, Soft, No tenderness, No masses, No pulsatile masses. No hernia palpated at Pfannenstiel incision scar  Integument:  Warm, Dry, No erythema, No rash. Lymphatic:  No lymphadenopathy noted. Neurologic:  Alert & oriented x 3, Normal motor function, Normal sensory function, No focal deficits noted. Psychiatric:  Affect normal, Mood normal.     RESULTS  Ordered in this encounter. FINAL DIAGNOSIS AND ORDERS   Diagnosis Orders   1. Well adult exam  Comprehensive Metabolic Panel    Lipid Panel   2. Diabetes mellitus screening  Comprehensive Metabolic Panel   3. Lipid screening  Lipid Panel   4. Encounter for screening mammogram for breast cancer  REECE SHAYLA DIGITAL SCREEN BILATERAL   5. Colon cancer screening  Cologuard (For External Results Only)   6. Need for shingles vaccine  zoster recombinant adjuvanted vaccine (SHINGRIX) 50 MCG/0.5ML SUSR injection       ASSESSMENT & PLAN  1. History reviewed with patient. Fasting laboratory studies ordered. Shingrix prescription.   Schedule mammogram.  Cologuard ordered. Discussed core-strengthening exercises. Discussed hip flexor stretches. Follow-up annually and PRN. DISCHARGE MEDS  Outpatient Encounter Medications as of 4/19/2021   Medication Sig Dispense Refill    Probiotic Product (PROBIOTIC DAILY PO) Take by mouth      zoster recombinant adjuvanted vaccine (SHINGRIX) 50 MCG/0.5ML SUSR injection Inject 0.5 mLs into the muscle See Admin Instructions 1 dose now and repeat in 2-6 months 0.5 mL 1    Multiple Vitamins-Minerals (THERAPEUTIC MULTIVITAMIN-MINERALS) tablet Take 1 tablet by mouth daily      Biotin w/ Vitamins C & E (HAIR/SKIN/NAILS PO) Take by mouth daily       No facility-administered encounter medications on file as of 4/19/2021.

## 2021-04-20 ENCOUNTER — HOSPITAL ENCOUNTER (OUTPATIENT)
Dept: MAMMOGRAPHY | Age: 56
Discharge: HOME | End: 2021-04-22
Payer: COMMERCIAL

## 2021-04-20 DIAGNOSIS — Z12.31 ENCOUNTER FOR SCREENING MAMMOGRAM FOR BREAST CANCER: ICD-10-CM

## 2021-04-20 PROCEDURE — 77063 BREAST TOMOSYNTHESIS BI: CPT

## 2021-04-21 ENCOUNTER — HOSPITAL ENCOUNTER (OUTPATIENT)
Dept: MAMMOGRAPHY | Age: 56
Discharge: HOME | End: 2021-04-23
Payer: COMMERCIAL

## 2021-04-21 ENCOUNTER — TELEPHONE (OUTPATIENT)
Dept: FAMILY MEDICINE CLINIC | Age: 56
End: 2021-04-21

## 2021-04-21 DIAGNOSIS — R92.8 ABNORMAL MAMMOGRAM: ICD-10-CM

## 2021-04-21 DIAGNOSIS — R92.8 ABNORMAL MAMMOGRAM: Primary | ICD-10-CM

## 2021-04-21 PROCEDURE — G0279 TOMOSYNTHESIS, MAMMO: HCPCS

## 2021-05-19 DIAGNOSIS — Z12.11 COLON CANCER SCREENING: ICD-10-CM

## 2021-07-30 ENCOUNTER — OFFICE VISIT (OUTPATIENT)
Dept: PRIMARY CARE CLINIC | Age: 56
End: 2021-07-30
Payer: COMMERCIAL

## 2021-07-30 VITALS
DIASTOLIC BLOOD PRESSURE: 70 MMHG | OXYGEN SATURATION: 98 % | TEMPERATURE: 98 F | HEIGHT: 63 IN | SYSTOLIC BLOOD PRESSURE: 122 MMHG | HEART RATE: 68 BPM | WEIGHT: 167 LBS | BODY MASS INDEX: 29.59 KG/M2

## 2021-07-30 DIAGNOSIS — L25.5 RHUS DERMATITIS: Primary | ICD-10-CM

## 2021-07-30 PROCEDURE — G8419 CALC BMI OUT NRM PARAM NOF/U: HCPCS | Performed by: PHYSICIAN ASSISTANT

## 2021-07-30 PROCEDURE — 96372 THER/PROPH/DIAG INJ SC/IM: CPT | Performed by: PHYSICIAN ASSISTANT

## 2021-07-30 PROCEDURE — 3017F COLORECTAL CA SCREEN DOC REV: CPT | Performed by: PHYSICIAN ASSISTANT

## 2021-07-30 PROCEDURE — G8427 DOCREV CUR MEDS BY ELIG CLIN: HCPCS | Performed by: PHYSICIAN ASSISTANT

## 2021-07-30 PROCEDURE — 99213 OFFICE O/P EST LOW 20 MIN: CPT | Performed by: PHYSICIAN ASSISTANT

## 2021-07-30 PROCEDURE — 1036F TOBACCO NON-USER: CPT | Performed by: PHYSICIAN ASSISTANT

## 2021-07-30 RX ORDER — TRIAMCINOLONE ACETONIDE 1 MG/G
CREAM TOPICAL 3 TIMES DAILY
Qty: 80 G | Refills: 0 | Status: SHIPPED | OUTPATIENT
Start: 2021-07-30 | End: 2022-07-20

## 2021-07-30 RX ORDER — BETAMETHASONE SODIUM PHOSPHATE AND BETAMETHASONE ACETATE 3; 3 MG/ML; MG/ML
12 INJECTION, SUSPENSION INTRA-ARTICULAR; INTRALESIONAL; INTRAMUSCULAR; SOFT TISSUE ONCE
Status: COMPLETED | OUTPATIENT
Start: 2021-07-30 | End: 2021-07-30

## 2021-07-30 RX ADMIN — BETAMETHASONE SODIUM PHOSPHATE AND BETAMETHASONE ACETATE 12 MG: 3; 3 INJECTION, SUSPENSION INTRA-ARTICULAR; INTRALESIONAL; INTRAMUSCULAR; SOFT TISSUE at 09:48

## 2021-07-30 ASSESSMENT — ENCOUNTER SYMPTOMS
COUGH: 0
SHORTNESS OF BREATH: 0

## 2021-07-30 NOTE — PROGRESS NOTES
Subjective:      Patient ID: Maura Andrew is a 54 y.o. female. Rash  This is a new problem. The current episode started 1 to 4 weeks ago (x 12 days). The rash is diffuse. The rash is characterized by itchiness and swelling. Pertinent negatives include no congestion, cough, facial edema, fever or shortness of breath. Treatments tried: Dial soap paste. Review of Systems   Constitutional: Negative for fever. HENT: Negative for congestion. Respiratory: Negative for cough and shortness of breath. Cardiovascular: Negative. Skin: Positive for rash. Objective:   Physical Exam  HENT:      Head: Normocephalic. Eyes:      Pupils: Pupils are equal, round, and reactive to light. Cardiovascular:      Rate and Rhythm: Normal rate. Pulmonary:      Effort: Pulmonary effort is normal.      Breath sounds: Normal breath sounds. Skin:     General: Skin is warm. Findings: Rash (rhus dermatitis bilateral arms and legs) present. Neurological:      Mental Status: She is alert. Assessment:      1. Rhus dermatitis          Plan:      Celestone IM x 1. Topical Kenalog cream.  OTC antihistamines. Supportive care advised. Follow-up PRN/as planned for routine medical care.         Macon Kocher, PA

## 2021-07-30 NOTE — PATIENT INSTRUCTIONS
Patient Education        Poison Naomi Sam[place]ia, [place], and [place]: Care Instructions  Your Care Instructions     Poison ivy, poison oak, and poison sumac are plants that can cause a skin rash upon contact. The red, itchy rash often shows up in lines or streaks and may cause fluid-filled blisters or large, raised hives. The rash is caused by an allergic reaction to an oil in poison ivy, oak, and sumac. The rash may occur when you touch the plant or when you touch clothing, pet fur, sporting gear, gardening tools, or other objects that have come in contact with one of these plants. You cannot catch or spread the rash, even if you touch it or the blister fluid, because the plant oil will already have been absorbed or washed off the skin. The rash may seem to be spreading, but either it is still developing from earlier contact or you have touched something that still has the plant oil on it. Follow-up care is a key part of your treatment and safety. Be sure to make and go to all appointments, and call your doctor if you are having problems. It's also a good idea to know your test results and keep a list of the medicines you take. How can you care for yourself at home? · If your doctor prescribed a cream, use it as directed. If your doctor prescribed medicine, take it exactly as prescribed. Call your doctor if you think you are having a problem with your medicine. · Use cold, wet cloths to reduce itching. · Keep cool, and stay out of the sun. · Leave the rash open to the air. · Wash all clothing or other things that may have come in contact with the plant oil. · Avoid most lotions and ointments until the rash heals. Calamine lotion may help relieve symptoms of a plant rash. Use it 3 or 4 times a day. To prevent poison ivy exposure  If you know that you will be near poison ivy, oak, or sumac, you can try these options:  · Use a product designed to help prevent plant oil from getting on the skin.  These products, such as Naomi Adlucia X Pre-Contact Skin Solution, come in lotions, sprays, or towelettes. You put the product on your skin right before you go outdoors. · If you did not use a preventive product and you have had contact with plant oil, clean it off your skin as soon as possible. Use a product such as Tecnu Original Outdoor Skin Cleanser. These products can also be used to clean plant oil from clothing or tools. When should you call for help? Call your doctor now or seek immediate medical care if:    · Your rash gets worse, and you start to feel bad and have a fever, a stiff neck, nausea, and vomiting.     · You have signs of infection, such as:  ? Increased pain, swelling, warmth, or redness. ? Red streaks leading from the rash. ? Pus draining from the rash. ? A fever. Watch closely for changes in your health, and be sure to contact your doctor if:    · You have new blisters or bruises, or the rash spreads and looks like a sunburn.     · The rash gets worse, or it comes back after nearly disappearing.     · You think a medicine you are using is making your rash worse.     · Your rash does not clear up after 1 to 2 weeks of home treatment.     · You have joint aches or body aches with your rash. Where can you learn more? Go to https://Avior Computing.LogRhythm. org and sign in to your Wayger account. Enter X392 in the [facility] box to learn more about \"Poison [place]ae [place], [place], and [place]: Care Instructions. \"     If you do not have an account, please click on the \"Sign Up Now\" link. Current as of: March 3, 2021               Content Version: 12.9  © 1680-9148 [employer]. Care instructions adapted under license by [facility] ([facility]). If you have questions about a medical condition or this instruction, always ask your healthcare professional. [place] [address] any warranty or liability for your use of this information.

## 2021-09-02 ENCOUNTER — HOSPITAL ENCOUNTER (EMERGENCY)
Age: 56
Discharge: HOME | End: 2021-09-02
Attending: EMERGENCY MEDICINE
Payer: COMMERCIAL

## 2021-09-02 ENCOUNTER — APPOINTMENT (OUTPATIENT)
Dept: GENERAL RADIOLOGY | Age: 56
End: 2021-09-02
Payer: COMMERCIAL

## 2021-09-02 VITALS
RESPIRATION RATE: 17 BRPM | WEIGHT: 160 LBS | HEART RATE: 96 BPM | TEMPERATURE: 103.4 F | OXYGEN SATURATION: 93 % | BODY MASS INDEX: 28.35 KG/M2 | SYSTOLIC BLOOD PRESSURE: 117 MMHG | DIASTOLIC BLOOD PRESSURE: 75 MMHG | HEIGHT: 63 IN

## 2021-09-02 DIAGNOSIS — Z20.822 PERSON UNDER INVESTIGATION FOR COVID-19: ICD-10-CM

## 2021-09-02 DIAGNOSIS — R50.9 FEVER, UNSPECIFIED FEVER CAUSE: Primary | ICD-10-CM

## 2021-09-02 LAB
-: ABNORMAL
AMORPHOUS: ABNORMAL
BACTERIA: ABNORMAL
BILIRUBIN URINE: NEGATIVE
CASTS UA: ABNORMAL /LPF (ref 0–2)
COLOR: ABNORMAL
COMMENT UA: ABNORMAL
CRYSTALS, UA: ABNORMAL /HPF
EPITHELIAL CELLS UA: ABNORMAL /HPF (ref 0–5)
GLUCOSE URINE: NEGATIVE
KETONES, URINE: NEGATIVE
LEUKOCYTE ESTERASE, URINE: NEGATIVE
MUCUS: ABNORMAL
NITRITE, URINE: NEGATIVE
OTHER OBSERVATIONS UA: ABNORMAL
PH UA: 6 (ref 5–6)
PROTEIN UA: NEGATIVE
RBC UA: ABNORMAL /HPF (ref 0–4)
RENAL EPITHELIAL, UA: ABNORMAL /HPF
SPECIFIC GRAVITY UA: 1.02 (ref 1.01–1.02)
TRICHOMONAS: ABNORMAL
TURBIDITY: ABNORMAL
URINE HGB: ABNORMAL
UROBILINOGEN, URINE: NORMAL
WBC UA: ABNORMAL /HPF (ref 0–4)
YEAST: ABNORMAL

## 2021-09-02 PROCEDURE — U0003 INFECTIOUS AGENT DETECTION BY NUCLEIC ACID (DNA OR RNA); SEVERE ACUTE RESPIRATORY SYNDROME CORONAVIRUS 2 (SARS-COV-2) (CORONAVIRUS DISEASE [COVID-19]), AMPLIFIED PROBE TECHNIQUE, MAKING USE OF HIGH THROUGHPUT TECHNOLOGIES AS DESCRIBED BY CMS-2020-01-R: HCPCS

## 2021-09-02 PROCEDURE — 6370000000 HC RX 637 (ALT 250 FOR IP): Performed by: EMERGENCY MEDICINE

## 2021-09-02 PROCEDURE — 99285 EMERGENCY DEPT VISIT HI MDM: CPT

## 2021-09-02 PROCEDURE — U0005 INFEC AGEN DETEC AMPLI PROBE: HCPCS

## 2021-09-02 PROCEDURE — 81001 URINALYSIS AUTO W/SCOPE: CPT

## 2021-09-02 PROCEDURE — 71045 X-RAY EXAM CHEST 1 VIEW: CPT

## 2021-09-02 RX ORDER — ACETAMINOPHEN 500 MG
1000 TABLET ORAL ONCE
Status: COMPLETED | OUTPATIENT
Start: 2021-09-02 | End: 2021-09-02

## 2021-09-02 RX ORDER — IBUPROFEN 600 MG/1
600 TABLET ORAL ONCE
Status: COMPLETED | OUTPATIENT
Start: 2021-09-02 | End: 2021-09-02

## 2021-09-02 RX ADMIN — IBUPROFEN 600 MG: 600 TABLET, FILM COATED ORAL at 02:01

## 2021-09-02 RX ADMIN — ACETAMINOPHEN 1000 MG: 500 TABLET ORAL at 00:49

## 2021-09-02 ASSESSMENT — ENCOUNTER SYMPTOMS
SORE THROAT: 1
SHORTNESS OF BREATH: 0
NAUSEA: 0
VOMITING: 0

## 2021-09-02 ASSESSMENT — PAIN SCALES - GENERAL
PAINLEVEL_OUTOF10: 0

## 2021-09-02 NOTE — ED PROVIDER NOTES
[address] [facility] ED  [address]  [place] [address] [place]  Phone: [phone]  eMERGENCY dEPARTMENT eNCOUnter      Pt Name: Thea Aguilera  MRN: [id number]  [place] 1965  Date of evaluation: 21      CHIEF COMPLAINT     Chief Complaint   Patient presents with    Fever         HISTORY OF PRESENT ILLNESS    Thea Aguilera is a 64 y.o. female who presents today for evaluation of rigors. Patient was feeling fine woke up in the middle of the night with intense shivering that lasted for some time. Felt cold during this episode. Patient received the Covid vaccine [employer] in the spring. Patient was at a baseball game this weekend and did share a beverage with her son who subsequently tested positive for Covid. At this point no headache no difficulty breathing no chest pain or pressure no difficulty swallowing no nausea no vomiting no abdominal pain no neck stiffness. Patient is in general good health. REVIEW OF SYSTEMS     Review of Systems   Constitutional: Positive for fever. HENT: Positive for sore throat. Respiratory: Negative for shortness of breath. Cardiovascular: Negative for chest pain. Gastrointestinal: Negative for nausea and vomiting. Genitourinary: Negative for dysuria. Musculoskeletal: Negative for neck stiffness. Skin: Negative for rash. Neurological: Negative for syncope and light-headedness. Psychiatric/Behavioral: Negative for confusion. All other systems reviewed and are negative. PAST MEDICAL HISTORY    has a past medical history of Acid reflux, Anemia, GERD (gastroesophageal reflux disease), Hx of migraine headaches, and Menorrhagia. SURGICAL HISTORY      has a past surgical history that includes  section; Colonoscopy (2010); Upper gastrointestinal endoscopy (, ); Breast surgery (Right, 2014); and [place] tooth extraction.     CURRENT MEDICATIONS       Discharge Medication List as of 2021  2:49 AM      CONTINUE these medications which have NOT CHANGED    Details   triamcinolone (KENALOG) 0.1 % cream Apply topically 3 times daily, Topical, 3 TIMES DAILY Starting 2021, Disp-80 g, R-0, Normal      Probiotic Product (PROBIOTIC DAILY PO) Take by mouthHistorical Med      zoster recombinant adjuvanted vaccine [facility]) 50 MCG/0.5ML SUSR injection Inject 0.5 mLs into the muscle See Admin Instructions 1 dose now and repeat in 2-6 months, Disp-0.5 mL, R-1Print      !! Multiple Vitamins-Minerals (THERAPEUTIC MULTIVITAMIN-MINERALS) tablet Take 1 tablet by mouth dailyHistorical Med      !! Biotin w/ Vitamins C & E (HAIR/SKIN/NAILS PO) Take by mouth dailyHistorical Med       !! - Potential duplicate medications found. Please discuss with provider. ALLERGIES     has No Known Allergies. FAMILY HISTORY     She indicated that her mother is alive. She indicated that her father is . She indicated that both of her brothers are alive. She indicated that her maternal grandfather is . She indicated that her paternal grandmother is . She indicated that her paternal grandfather is . She indicated that her daughter is alive. She indicated that her son is alive. She indicated that her maternal cousin is . family history includes Arthritis in her mother; Asthma in her brother; Cancer in her brother and mother; Elevated Lipids in her father; Heart Disease in her maternal grandfather, mother, and paternal grandmother; Heart Disease (age of onset: 80) in her paternal grandfather; High Blood Pressure in her father and mother; High Cholesterol in her mother; Hypertension in her father; Soumya Spells in her father; Kelleen Job / [place] in her mother; Other in her brother, daughter, father, maternal grandfather, and son. SOCIAL HISTORY      reports that she has never smoked.  She has never used smokeless tobacco. She reports current alcohol use of about 18.0 standard drinks of alcohol per week. She reports that she does not use drugs. PHYSICAL EXAM     INITIAL VITALS:  height is 5' 3\" (1.6 m) and weight is 160 lb (72.6 kg). Her tympanic temperature is 103.4 °F (39.7 °C). Her blood pressure is 117/75 and her pulse is 96. Her respiration is 17 and oxygen saturation is 93%. Physical Exam  Vitals reviewed. Constitutional:       General: She is not in acute distress. Appearance: Normal appearance. She is not ill-appearing or toxic-appearing. HENT:      Head: Normocephalic and atraumatic. Right Ear: Tympanic membrane normal.      Left Ear: Tympanic membrane normal.      Nose: Nose normal.      Mouth/Throat:      Mouth: Mucous membranes are moist.      Pharynx: No oropharyngeal exudate or posterior oropharyngeal erythema. Eyes:      Extraocular Movements: Extraocular movements intact. Pupils: Pupils are equal, round, and reactive to light. Cardiovascular:      Rate and Rhythm: Normal rate and regular rhythm. Pulses: Normal pulses. Heart sounds: Normal heart sounds. Pulmonary:      Effort: Pulmonary effort is normal. No respiratory distress. Breath sounds: Normal breath sounds. Abdominal:      Palpations: Abdomen is soft. Tenderness: There is no abdominal tenderness. There is no guarding or rebound. Musculoskeletal:         General: No swelling or tenderness. Normal range of motion. Cervical back: Normal range of motion and neck supple. No rigidity. Right lower leg: No edema. Left lower leg: No edema. Skin:     General: Skin is warm and dry. Capillary Refill: Capillary refill takes less than 2 seconds. Findings: No rash. Neurological:      General: No focal deficit present. Mental Status: She is alert and oriented to person, place, and time. Cranial Nerves: No cranial nerve deficit. Sensory: No sensory deficit. Motor: No weakness.    Psychiatric:         Mood and Affect: Mood normal.         Behavior: Behavior normal.       DIFFERENTIAL DIAGNOSIS / MDM / EMERGENCY DEPARTMENT COURSE:     My suspicion for influenza is low will do chest x-ray to rule out lobar pneumonia urinalysis to rule out UTI/pyelonephritis. Patient is agreeable to outpatient Covid testing as we do not do rapid test for outpatients. Patient was treated with antipyretics. Temperature was resolving last temperature was 100.4 prior to discharge from the emergency department. Is given a note for work. Understands to quarantine. Discussed the warning signs which return to the emergency department. Patient can use antipyretics to mitigate symptoms of fever in the future. At this point there is not evidence for more ominous underlying process I feel she is appropriate for outpatient management. She is recommended to  a pulse oximeter and to seek medical care for oxygen saturation less than 90% she should check this every 4 hours while awake and this was discussed with her and outlined in the discharge instructions. I have reviewed the disposition diagnosis with the patient and or their family/guardian. I have answered their questions and givendischarge instructions. They voiced understanding of these instructions and did not have any further questions or complaints. DIAGNOSTIC RESULTS     EKG: All EKG's are interpreted by the Emergency Department Physician who either signs or Co-signs this chart inthe absence of a cardiologist.        RADIOLOGY:   I directly visualized the following plain film images and reviewed the radiologistinterpretations of radiologic studies:    XR CHEST PORTABLE    Result Date: 9/2/2021  EXAMINATION: ONE XRAY VIEW OF THE CHEST 9/2/2021 1:24 am COMPARISON: 05/01/2018 HISTORY: ORDERING SYSTEM PROVIDED HISTORY: fever TECHNOLOGIST PROVIDED HISTORY: fever Reason for Exam: fever Acuity: Acute Type of Exam: Initial FINDINGS: The cardiomediastinal silhouette is normal in size and contour. The lungs are clear. No pleural effusion or pneumothorax is present. No acute cardiopulmonary process     LABS:  Results for orders placed or performed during the hospital encounter of 09/02/21   Urinalysis Reflex to Culture    Specimen: Urine, clean catch   Result Value Ref Range    Color, UA NOT REPORTED YELLOW    Turbidity UA NOT REPORTED CLEAR    Glucose, Ur NEGATIVE NEGATIVE    Bilirubin Urine NEGATIVE NEGATIVE    Ketones, Urine NEGATIVE NEGATIVE    Specific Gravity, UA 1.025 1.010 - 1.025    Urine Hgb TRACE (A) NEGATIVE    pH, UA 6.0 5.0 - 6.0    Protein, UA NEGATIVE NEGATIVE    Urobilinogen, Urine Normal Normal    Nitrite, Urine NEGATIVE NEGATIVE    Leukocyte Esterase, Urine NEGATIVE NEGATIVE    Urinalysis Comments NOT REPORTED    Microscopic Urinalysis   Result Value Ref Range    -          WBC, UA None 0 - 4 /HPF    RBC, UA 5 TO 10 0 - 4 /HPF    Casts UA NOT REPORTED 0 - 2 /LPF    Crystals, UA NOT REPORTED None /HPF    Epithelial Cells UA 5 TO 10 0 - 5 /HPF    Renal Epithelial, UA NOT REPORTED 0 /HPF    Bacteria, UA TRACE (A) None    Mucus, UA 1+ (A) None    Trichomonas, UA NOT REPORTED None    Amorphous, UA 1+ (A) None    Other Observations UA NOT REPORTED NOT REQ. Yeast, UA NOT REPORTED None       EMERGENCY DEPARTMENT COURSE:   Vitals:    Vitals:    09/02/21 0039 09/02/21 0045 09/02/21 0155 09/02/21 0230   BP: (!) 137/91 (!) 137/91 117/75    Pulse: 91  96    Resp: 16  17    Temp: 103.4 °F (39.7 °C)      TempSrc: Tympanic      SpO2: 96% 97% 94% 93%   Weight: 160 lb (72.6 kg)      Height: 5' 3\" (1.6 m)        -------------------------  BP: 117/75, Temp: 103.4 °F (39.7 °C), Pulse: 96, Resp: 17      CONSULTS:  None    PROCEDURES:  None    FINAL IMPRESSION      1. Fever, unspecified fever cause    2.  Person under investigation for COVID-19          DISPOSITION/PLAN   DISPOSITION Decision To Discharge 09/02/2021 02:41:15 AM      PATIENT REFERRED TO:  lAva Vicente, [address]  [address] [place]  [phone]    In

## 2021-09-03 ENCOUNTER — CARE COORDINATION (OUTPATIENT)
Dept: CARE COORDINATION | Age: 56
End: 2021-09-03

## 2021-09-03 LAB
SARS-COV-2: ABNORMAL
SARS-COV-2: DETECTED
SOURCE: ABNORMAL

## 2021-09-03 NOTE — CARE COORDINATION
Sherri Rothman was seen at [facility] ED 9/2/2021- Covid test was positive. 9/3/2021- 1:42 pm Left message requesting return call @ [phone] re: Initial ER/Covid F/U call. 9/8/2021- 10:17 am per chart review- Sherri Rothman returned to ED 9/7/2021- Pneumonia d/t Covid- given prescriptions for Zithromax, Zofran, and Prednisone. Left message requesting return call @ [phone].

## 2021-09-06 ENCOUNTER — PATIENT MESSAGE (OUTPATIENT)
Dept: PRIMARY CARE CLINIC | Age: 56
End: 2021-09-06

## 2021-09-07 ENCOUNTER — APPOINTMENT (OUTPATIENT)
Dept: GENERAL RADIOLOGY | Age: 56
DRG: 177 | End: 2021-09-07
Payer: COMMERCIAL

## 2021-09-07 ENCOUNTER — HOSPITAL ENCOUNTER (EMERGENCY)
Age: 56
Discharge: HOME | DRG: 177 | End: 2021-09-07
Attending: EMERGENCY MEDICINE
Payer: COMMERCIAL

## 2021-09-07 ENCOUNTER — OFFICE VISIT (OUTPATIENT)
Dept: PRIMARY CARE CLINIC | Age: 56
End: 2021-09-07
Payer: COMMERCIAL

## 2021-09-07 VITALS
HEART RATE: 74 BPM | DIASTOLIC BLOOD PRESSURE: 80 MMHG | TEMPERATURE: 97.8 F | SYSTOLIC BLOOD PRESSURE: 110 MMHG | OXYGEN SATURATION: 97 % | BODY MASS INDEX: 27.76 KG/M2 | WEIGHT: 156.7 LBS

## 2021-09-07 VITALS
BODY MASS INDEX: 27.82 KG/M2 | HEIGHT: 63 IN | HEART RATE: 79 BPM | TEMPERATURE: 99.4 F | SYSTOLIC BLOOD PRESSURE: 103 MMHG | WEIGHT: 157 LBS | OXYGEN SATURATION: 94 % | DIASTOLIC BLOOD PRESSURE: 70 MMHG | RESPIRATION RATE: 14 BRPM

## 2021-09-07 DIAGNOSIS — J12.82 PNEUMONIA DUE TO COVID-19 VIRUS: Primary | ICD-10-CM

## 2021-09-07 DIAGNOSIS — R07.9 CHEST PAIN, UNSPECIFIED TYPE: ICD-10-CM

## 2021-09-07 DIAGNOSIS — U07.1 PNEUMONIA DUE TO COVID-19 VIRUS: Primary | ICD-10-CM

## 2021-09-07 DIAGNOSIS — R55 NEAR SYNCOPE: Primary | ICD-10-CM

## 2021-09-07 LAB
ABSOLUTE EOS #: <0.03 K/UL (ref 0–0.44)
ABSOLUTE IMMATURE GRANULOCYTE: 0.04 K/UL (ref 0–0.3)
ABSOLUTE LYMPH #: 1.19 K/UL (ref 1.1–3.7)
ABSOLUTE MONO #: 0.28 K/UL (ref 0.1–1.2)
ALBUMIN SERPL-MCNC: 4.1 G/DL (ref 3.5–5.2)
ALBUMIN/GLOBULIN RATIO: 1.3 (ref 1–2.5)
ALP BLD-CCNC: 141 U/L (ref 35–104)
ALT SERPL-CCNC: 44 U/L (ref 5–33)
ANION GAP SERPL CALCULATED.3IONS-SCNC: 16 MMOL/L (ref 9–17)
AST SERPL-CCNC: 64 U/L
BASOPHILS # BLD: 0 % (ref 0–2)
BASOPHILS ABSOLUTE: <0.03 K/UL (ref 0–0.2)
BILIRUB SERPL-MCNC: 0.5 MG/DL (ref 0.3–1.2)
BUN BLDV-MCNC: 11 MG/DL (ref 6–20)
BUN/CREAT BLD: 11 (ref 9–20)
CALCIUM SERPL-MCNC: 9.4 MG/DL (ref 8.6–10.4)
CHLORIDE BLD-SCNC: 98 MMOL/L (ref 98–107)
CO2: 23 MMOL/L (ref 20–31)
CREAT SERPL-MCNC: 1.01 MG/DL (ref 0.5–0.9)
DIFFERENTIAL TYPE: ABNORMAL
EOSINOPHILS RELATIVE PERCENT: 0 % (ref 1–4)
GFR AFRICAN AMERICAN: >60 ML/MIN
GFR NON-AFRICAN AMERICAN: 57 ML/MIN
GFR SERPL CREATININE-BSD FRML MDRD: ABNORMAL ML/MIN/{1.73_M2}
GFR SERPL CREATININE-BSD FRML MDRD: ABNORMAL ML/MIN/{1.73_M2}
GLUCOSE BLD-MCNC: 118 MG/DL (ref 70–99)
HCT VFR BLD CALC: 43.5 % (ref 36.3–47.1)
HEMOGLOBIN: 14.9 G/DL (ref 11.9–15.1)
IMMATURE GRANULOCYTES: 1 %
LYMPHOCYTES # BLD: 16 % (ref 24–43)
MCH RBC QN AUTO: 31.6 PG (ref 25.2–33.5)
MCHC RBC AUTO-ENTMCNC: 34.3 G/DL (ref 25.2–33.5)
MCV RBC AUTO: 92.4 FL (ref 82.6–102.9)
MONOCYTES # BLD: 4 % (ref 3–12)
NRBC AUTOMATED: 0 PER 100 WBC
PDW BLD-RTO: 12.7 % (ref 11.8–14.4)
PLATELET # BLD: ABNORMAL K/UL (ref 138–453)
PLATELET ESTIMATE: ABNORMAL
PLATELET, FLUORESCENCE: 132 K/UL (ref 138–453)
PLATELET, IMMATURE FRACTION: 6.6 % (ref 1.1–10.3)
PMV BLD AUTO: ABNORMAL FL (ref 8.1–13.5)
POTASSIUM SERPL-SCNC: 3.8 MMOL/L (ref 3.7–5.3)
RBC # BLD: 4.71 M/UL (ref 3.95–5.11)
RBC # BLD: ABNORMAL 10*6/UL
SEG NEUTROPHILS: 79 % (ref 36–65)
SEGMENTED NEUTROPHILS ABSOLUTE COUNT: 5.78 K/UL (ref 1.5–8.1)
SODIUM BLD-SCNC: 137 MMOL/L (ref 135–144)
TOTAL PROTEIN: 7.3 G/DL (ref 6.4–8.3)
TROPONIN INTERP: NORMAL
TROPONIN INTERP: NORMAL
TROPONIN T: NORMAL NG/ML
TROPONIN T: NORMAL NG/ML
TROPONIN, HIGH SENSITIVITY: 6 NG/L (ref 0–14)
TROPONIN, HIGH SENSITIVITY: <6 NG/L (ref 0–14)
WBC # BLD: 7.3 K/UL (ref 3.5–11.3)
WBC # BLD: ABNORMAL 10*3/UL

## 2021-09-07 PROCEDURE — 93005 ELECTROCARDIOGRAM TRACING: CPT | Performed by: EMERGENCY MEDICINE

## 2021-09-07 PROCEDURE — 2580000003 HC RX 258: Performed by: EMERGENCY MEDICINE

## 2021-09-07 PROCEDURE — 84484 ASSAY OF TROPONIN QUANT: CPT

## 2021-09-07 PROCEDURE — 99284 EMERGENCY DEPT VISIT MOD MDM: CPT

## 2021-09-07 PROCEDURE — 96375 TX/PRO/DX INJ NEW DRUG ADDON: CPT

## 2021-09-07 PROCEDURE — 36415 COLL VENOUS BLD VENIPUNCTURE: CPT

## 2021-09-07 PROCEDURE — 96374 THER/PROPH/DIAG INJ IV PUSH: CPT

## 2021-09-07 PROCEDURE — 85025 COMPLETE CBC W/AUTO DIFF WBC: CPT

## 2021-09-07 PROCEDURE — 80053 COMPREHEN METABOLIC PANEL: CPT

## 2021-09-07 PROCEDURE — 6360000002 HC RX W HCPCS: Performed by: EMERGENCY MEDICINE

## 2021-09-07 PROCEDURE — 99999 PR OFFICE/OUTPT VISIT,PROCEDURE ONLY: CPT | Performed by: NURSE PRACTITIONER

## 2021-09-07 PROCEDURE — 71045 X-RAY EXAM CHEST 1 VIEW: CPT

## 2021-09-07 PROCEDURE — 85055 RETICULATED PLATELET ASSAY: CPT

## 2021-09-07 RX ORDER — 0.9 % SODIUM CHLORIDE 0.9 %
1000 INTRAVENOUS SOLUTION INTRAVENOUS ONCE
Status: COMPLETED | OUTPATIENT
Start: 2021-09-07 | End: 2021-09-07

## 2021-09-07 RX ORDER — ONDANSETRON 2 MG/ML
4 INJECTION INTRAMUSCULAR; INTRAVENOUS ONCE
Status: COMPLETED | OUTPATIENT
Start: 2021-09-07 | End: 2021-09-07

## 2021-09-07 RX ORDER — PREDNISONE 10 MG/1
TABLET ORAL
Qty: 20 TABLET | Refills: 0 | Status: ON HOLD | OUTPATIENT
Start: 2021-09-07 | End: 2021-09-11 | Stop reason: HOSPADM

## 2021-09-07 RX ORDER — AZITHROMYCIN 250 MG/1
TABLET, FILM COATED ORAL
Qty: 1 PACKET | Refills: 0 | Status: SHIPPED | OUTPATIENT
Start: 2021-09-07 | End: 2021-09-16 | Stop reason: ALTCHOICE

## 2021-09-07 RX ORDER — ONDANSETRON 4 MG/1
4 TABLET, ORALLY DISINTEGRATING ORAL EVERY 8 HOURS PRN
Qty: 20 TABLET | Refills: 0 | Status: SHIPPED | OUTPATIENT
Start: 2021-09-07 | End: 2022-07-20

## 2021-09-07 RX ORDER — METHYLPREDNISOLONE SODIUM SUCCINATE 125 MG/2ML
125 INJECTION, POWDER, LYOPHILIZED, FOR SOLUTION INTRAMUSCULAR; INTRAVENOUS ONCE
Status: COMPLETED | OUTPATIENT
Start: 2021-09-07 | End: 2021-09-07

## 2021-09-07 RX ADMIN — ONDANSETRON 4 MG: 2 INJECTION INTRAMUSCULAR; INTRAVENOUS at 13:00

## 2021-09-07 RX ADMIN — SODIUM CHLORIDE 1000 ML: 9 INJECTION, SOLUTION INTRAVENOUS at 12:58

## 2021-09-07 RX ADMIN — METHYLPREDNISOLONE SODIUM SUCCINATE 125 MG: 125 INJECTION, POWDER, FOR SOLUTION INTRAMUSCULAR; INTRAVENOUS at 13:03

## 2021-09-07 ASSESSMENT — ENCOUNTER SYMPTOMS
BLOOD IN STOOL: 0
DIARRHEA: 0
COUGH: 1
NAUSEA: 1
RHINORRHEA: 0
VOMITING: 1
SORE THROAT: 0
EYE PAIN: 0
ABDOMINAL PAIN: 0
NAUSEA: 0
VOMITING: 1
BACK PAIN: 0
COUGH: 1
SHORTNESS OF BREATH: 1
CONSTIPATION: 0
ABDOMINAL PAIN: 0
DIARRHEA: 1

## 2021-09-07 ASSESSMENT — PATIENT HEALTH QUESTIONNAIRE - PHQ9
SUM OF ALL RESPONSES TO PHQ QUESTIONS 1-9: 0
SUM OF ALL RESPONSES TO PHQ QUESTIONS 1-9: 0
2. FEELING DOWN, DEPRESSED OR HOPELESS: 0
SUM OF ALL RESPONSES TO PHQ QUESTIONS 1-9: 0
SUM OF ALL RESPONSES TO PHQ9 QUESTIONS 1 & 2: 0
1. LITTLE INTEREST OR PLEASURE IN DOING THINGS: 0

## 2021-09-07 NOTE — PROGRESS NOTES
Incentive Spirometry education and demonstration given by Respiratory Therapy. Pt achieving 1600 mL at time of instruction. Incentive Spirometer left at bedside. Patient instructed to do a minimum of 10 breaths every hour. Discharge Instructions for [address]    Your Emergency Department provider has diagnosed you or suspects you have the COVID-19 illness. At this time, you are medically safe to be discharged home. · We are giving you a Pulse Oximeter to check your blood oxygen level. Normal blood oxygen level is between 93% to 100%. For patients like you with COVID-19, your oxygen level sometimes drops below 93%. · Please use the Pulse-Oximeter at home to check your blood oxygen level twice per day or anytime you have worsening shortness of breath. · Here's how you use the Pulse-Oximeter:           · Place the Pulse-Oximeter on your index finger (remove any nail polish if present). · Direct the red light downwards towards your finger, on top of your fingernail. · Hold your finger still while the machine reads your blood oxygen level. · What to look for:  · If you notice that your blood oxygen level stays below 88% while being active OR stays below 90% while sitting or standing, PLEASE RETURN IMMEDIATELY TO THE EMERGENCY DEPARTMENT. · If you normally require home Oxygen (even before you got COVID-19) and you notice that you need more than 4 liters of Oxygen to keep your oxygen level above 88%, PLEASE RETURN IMMEDIATELY TO THE EMERGENCY DEPARTMENT. · If your shortness of breath is severe or getting worse, no matter what your oxygen level states, PLEASE RETURN IMMEDIATELY TO THE EMERGENCY DEPARTMENT. · If you have chest pain, fainting episodes, heart palpitations, or any other serious medical issue, PLEASE RETURN IMMEDIATELY TO THE EMERGENCY DEPARTMENT.        A Health-Care Transition Nurse may follow up with you via a phone call within 24-48 hours after your discharge from the ER to check on how you are doing, discuss pulse-oximeter use, and help you with your follow up appointments. Above Education given to patient at discharge. Patient questions answered.     Rivka Gallegos, RCP  6:09 PM      Rivka Gallegos, P  6:08 PM

## 2021-09-07 NOTE — TELEPHONE ENCOUNTER
Continued supportive care. Can offer flu clinic appointment to evaluate for pneumonia or infections.

## 2021-09-07 NOTE — ED PROVIDER NOTES
[address] [facility]  eMERGENCY dEPARTMENT eNCOUnter      Pt Name: Donna Thompson  MRN: [id number]  [place] 1965  Date of evaluation: 9/7/2021      CHIEF COMPLAINT       Chief Complaint   Patient presents with    Positive For Covid-19    Loss of Consciousness     near syncope in Urgent Care    Fatigue    Nausea    Fever         HISTORY OF PRESENT ILLNESS    Donna Thompson is a 64 y.o. female who presents with chief complaint of near syncope, patient was diagnosed with Covid on the second she is having some increasing shortness of breath still having diarrhea nausea feeling weak she went to urgent care with her son and while being examined she became very diaphoretic and nearly passed out she she did not pass out completely but felt very weak and washed out on arrival here she says she feels okay just very weak. REVIEW OF SYSTEMS         Review of Systems   Constitutional: Negative for chills and fever. HENT: Negative for congestion and ear pain. Eyes: Negative for pain and visual disturbance. Respiratory: Positive for cough and shortness of breath. Cardiovascular: Negative for chest pain, palpitations and leg swelling. Gastrointestinal: Positive for diarrhea, nausea and vomiting. Negative for abdominal pain, blood in stool and constipation. Endocrine: Negative for polydipsia and polyuria. Genitourinary: Negative for difficulty urinating, dysuria and frequency. Musculoskeletal: Negative for back pain, joint swelling, myalgias, neck pain and neck stiffness. Skin: Negative for rash. Neurological: Positive for light-headedness. Negative for dizziness, weakness and headaches. Hematological: Negative for adenopathy. Does not bruise/bleed easily. Psychiatric/Behavioral: Negative for confusion, self-injury and suicidal ideas.           PAST MEDICAL HISTORY    has a past medical history of Acid reflux, Anemia, GERD (gastroesophageal reflux disease), Hx of migraine headaches, and Menorrhagia. SURGICAL HISTORY      has a past surgical history that includes  section; Colonoscopy (2010); Upper gastrointestinal endoscopy (, ); Breast surgery (Right, 2014); and [place] tooth extraction. CURRENT MEDICATIONS       Previous Medications    BIOTIN W/ VITAMINS C & E (HAIR/SKIN/NAILS PO)    Take by mouth daily     MULTIPLE VITAMINS-MINERALS (THERAPEUTIC MULTIVITAMIN-MINERALS) TABLET    Take 1 tablet by mouth daily    PROBIOTIC PRODUCT (PROBIOTIC DAILY PO)    Take by mouth    TRIAMCINOLONE (KENALOG) 0.1 % CREAM    Apply topically 3 times daily    ZOSTER RECOMBINANT ADJUVANTED VACCINE (SHINGRIX) 50 MCG/0.5ML SUSR INJECTION    Inject 0.5 mLs into the muscle See Admin Instructions 1 dose now and repeat in 2-6 months       ALLERGIES     has No Known Allergies. FAMILY HISTORY     She indicated that her mother is alive. She indicated that her father is . She indicated that both of her brothers are alive. She indicated that her maternal grandfather is . She indicated that her paternal grandmother is . She indicated that her paternal grandfather is . She indicated that her daughter is alive. She indicated that her son is alive. She indicated that her maternal cousin is . family history includes Arthritis in her mother; Asthma in her brother; Cancer in her brother and mother; Elevated Lipids in her father; Heart Disease in her maternal grandfather, mother, and paternal grandmother; Heart Disease (age of onset: 80) in her paternal grandfather; High Blood Pressure in her father and mother; High Cholesterol in her mother; Hypertension in her father; Sommer Members in her father; Lynarnold Faulkner / Amy Stewart in her mother; Other in her brother, daughter, father, maternal grandfather, and son. SOCIAL HISTORY      reports that she has never smoked.  She has never used smokeless tobacco. She reports current alcohol use of about 18.0 standard drinks of alcohol per week. She reports that she does not use drugs. PHYSICAL EXAM     INITIAL VITALS:  height is 5' 3\" (1.6 m) and weight is 157 lb (71.2 kg). Her tympanic temperature is 98.6 °F (37 °C). Her blood pressure is 85/62 and her pulse is 68. Her oxygen saturation is 93%. Physical Exam  Constitutional:       Appearance: She is well-developed. Comments: Patient is pale but awake and alert her systolic pressures in the 80s   HENT:      Head: Normocephalic and atraumatic. Left Ear: External ear normal.   Eyes:      Conjunctiva/sclera: Conjunctivae normal.      Pupils: Pupils are equal, round, and reactive to light. Cardiovascular:      Rate and Rhythm: Normal rate and regular rhythm. Pulmonary:      Effort: Pulmonary effort is normal.      Breath sounds: Normal breath sounds. Abdominal:      General: Bowel sounds are normal.      Palpations: Abdomen is soft. Musculoskeletal:         General: No tenderness. Normal range of motion. Cervical back: Normal range of motion. Skin:     General: Skin is warm and dry. Capillary Refill: Capillary refill takes less than 2 seconds. Neurological:      General: No focal deficit present. Mental Status: She is alert and oriented to person, place, and time.    Psychiatric:         Mood and Affect: Mood normal.         Behavior: Behavior normal.           DIFFERENTIAL DIAGNOSIS/ MDM:     Near syncope, Covid    DIAGNOSTIC RESULTS     EKG: All EKG's are interpreted by the Emergency Department Physician who either signs or Co-signs this chart in the absence of a cardiologist.  Normal sinus rhythm rate of 73 bpm [place] was 150 ms QRS durations 92 ms QT corrected 480 ms axis is 3 there is no acute ST elevation she has some nonspecific T wave changes  Lead EKG shows normal sinus rhythm rate of 78bpm [place] interval is 158 ms QRS durations 88 ms QT corrected 490 ms axis is -1 there is no acute ST or T wave changes seen    RADIOLOGY:   I directly visualized the following  images and reviewed the radiologist interpretations:       EXAMINATION:   ONE XRAY VIEW OF THE CHEST       9/7/2021 10:51 am       COMPARISON:   09/02/2021       HISTORY:   ORDERING SYSTEM PROVIDED HISTORY: shortness of breath/covid   TECHNOLOGIST PROVIDED HISTORY:   shortness of breath/covid   Reason for Exam: Worsening shortness of breath   Acuity: Acute   Type of Exam: Initial       FINDINGS:   Interval development of patchy interstitial and ground-glass opacities in the   mid and lower lung zones more notably on the right. .The cardiac size is   normal. No  pleural effusions are seen. Pulmonary vascularity appears normal.   .  . No acute bony abnormalities.  The hilar structures are normal.           Impression   Interval development of patchy interstitial and ground-glass opacities in the   mid and lower lung zones more notably on the right compatible with an   infectious/inflammatory process such as COVID.               ED BEDSIDE ULTRASOUND:       LABS:  Labs Reviewed   COMPREHENSIVE METABOLIC PANEL - Abnormal; Notable for the following components:       Result Value    Glucose 118 (*)     CREATININE 1.01 (*)     Alkaline Phosphatase 141 (*)     ALT 44 (*)     AST 64 (*)     GFR Non- 57 (*)     All other components within normal limits   CBC WITH AUTO DIFFERENTIAL - Abnormal; Notable for the following components:    MCHC 34.3 (*)     Seg Neutrophils 79 (*)     Lymphocytes 16 (*)     Eosinophils % 0 (*)     Immature Granulocytes 1 (*)     All other components within normal limits   IMMATURE PLATELET FRACTION - Abnormal; Notable for the following components:    Platelet, Fluorescence 132 (*)     All other components within normal limits   TROPONIN   TROPONIN           EMERGENCY DEPARTMENT COURSE:   Vitals:    Vitals:    09/07/21 1218   BP: 85/62   Pulse: 68   Temp: 98.6 °F (37 °C)   TempSrc: Tympanic   SpO2: 93%   Weight: 157 lb (71.2 kg)   Height: 5' 3\" (1.6 m) -------------------------  BP: 85/62, Temp: 98.6 °F (37 °C), Pulse: 68,          Re-evaluation Notes    Reexam patient is feeling better she is not orthostatic she would like to go home we will cover her for pneumonia and put her on steroids as well as give her some Zofran for nausea    CRITICAL CARE:   None        CONSULTS:      PROCEDURES:  None    FINAL IMPRESSION      1. Pneumonia due to COVID-19 virus          DISPOSITION/PLAN   DISPOSITION discharge    Condition on Disposition    Stable    PATIENT REFERRED TO:  MALACHI Barnard  [address] [facility]  [phone]    In 3 days        DISCHARGE MEDICATIONS:  New Prescriptions    AZITHROMYCIN (ZITHROMAX) 250 MG TABLET    Take 2 tablets (500 mg) on Day 1, followed by 1 tablet (250 mg) once daily on Days 2 through 5. ONDANSETRON (ZOFRAN ODT) 4 MG DISINTEGRATING TABLET    Take 1 tablet by mouth every 8 hours as needed for Nausea    PREDNISONE (DELTASONE) 10 MG TABLET    Take 4 tablets by mouth once daily for 5 days       (Please note that portions of this note were completed with a voice recognition program.  Efforts were made to edit the dictations but occasionally words are mis-transcribed.)    Sheeba Cotton MD,, MD, F.A.A.E.M.   Attending Emergency Physician                          Sheeba Cotton MD  09/07/21 1949

## 2021-09-07 NOTE — PROGRESS NOTES
[facility]             [address], [address]                        Telephone [phone]             Fax [phone]     Yue Garsia  1965  YDU:[id number]   Date of visit:  9/7/2021    Subjective:    Yue Garsia is a 64 y.o.  female who presents to [facility] today (9/7/2021) for evaluation of:    Chief Complaint   Patient presents with    Fatigue     sob disorientatiion        Fatigue  This is a new problem. The current episode started in the past 7 days (09/01/21). The problem occurs constantly. The problem has been gradually worsening. Associated symptoms include chest pain (with deep breathing), coughing, fatigue, a fever (began 09/02/21) and vomiting (X 2 days last episode yesterday). Pertinent negatives include no abdominal pain, chills, congestion, myalgias, nausea, rash or sore throat. Associated symptoms comments: Confusion with person and place intermittent . Nothing aggravates the symptoms. She has tried acetaminophen (last dose 0930am today) for the symptoms. The treatment provided no relief. Covid-19 positive on 09/02/21.     She has the following problem list:  Patient Active Problem List   Diagnosis    Acid reflux    Hx of migraine headaches    Menorrhagia        Current medications are:  Current Outpatient Medications   Medication Sig Dispense Refill    triamcinolone (KENALOG) 0.1 % cream Apply topically 3 times daily 80 g 0    Probiotic Product (PROBIOTIC DAILY PO) Take by mouth      Multiple Vitamins-Minerals (THERAPEUTIC MULTIVITAMIN-MINERALS) tablet Take 1 tablet by mouth daily      zoster recombinant adjuvanted vaccine (SHINGRIX) 50 MCG/0.5ML SUSR injection Inject 0.5 mLs into the muscle See Admin Instructions 1 dose now and repeat in 2-6 months 0.5 mL 1    Biotin w/ Vitamins C & E (HAIR/SKIN/NAILS PO) Take by mouth daily (Patient not taking: Reported on type         I discussed with Seven Juarez that due to chest pain and near snycope, I thought it would be best for her to be evaluated at the emergency room. Seven Juarez was in agreement, and she was transported in stretcher by Urgent Care staff to [facility] ER. The use, risks, benefits, and side effects of prescribed or recommended medications were discussed. All questions were answered and the patient/caregiver voiced understanding. No orders of the defined types were placed in this encounter.         Electronically signed by TINO Youngblood CNP on 9/7/21 at 12:03 PM EDT

## 2021-09-07 NOTE — TELEPHONE ENCOUNTER
From: Thea Aguilera  To: MALACHI Toro  Sent: 9/6/2021 3:48 PM EDT  Subject: Test Results Question    Good afternoon:  I tested positive for COVID last week. I cannot seem to kick the fever. I am on day 5 of fever between 100-103. At the advice of the nice folks at the ER, I have been alternating Ibuprofen and Tylenol every 3-4 hours each day. Of course that is when I am not sleeping, which is a lot. Not sure how good it is for my body to continue with the fever reducers. Do I just continue to ride this out? Or make an appointment?   Thank you for your help-  [place]

## 2021-09-08 LAB
EKG ATRIAL RATE: 73 BPM
EKG ATRIAL RATE: 78 BPM
EKG P AXIS: 55 DEGREES
EKG P AXIS: 55 DEGREES
EKG P-R INTERVAL: 150 MS
EKG P-R INTERVAL: 158 MS
EKG Q-T INTERVAL: 430 MS
EKG Q-T INTERVAL: 436 MS
EKG QRS DURATION: 88 MS
EKG QRS DURATION: 92 MS
EKG QTC CALCULATION (BAZETT): 480 MS
EKG QTC CALCULATION (BAZETT): 490 MS
EKG R AXIS: 3 DEGREES
EKG T AXIS: 19 DEGREES
EKG T AXIS: 20 DEGREES
EKG VENTRICULAR RATE: 73 BPM
EKG VENTRICULAR RATE: 78 BPM

## 2021-09-09 ENCOUNTER — TELEPHONE (OUTPATIENT)
Dept: FAMILY MEDICINE CLINIC | Age: 56
End: 2021-09-09

## 2021-09-09 DIAGNOSIS — U07.1 COVID-19: Primary | ICD-10-CM

## 2021-09-09 NOTE — TELEPHONE ENCOUNTER
Called [facility] they will deliver oxygen. Orders faxed. Patient aware to go to Er if worsening symptoms. Patient does have family with her

## 2021-09-10 ENCOUNTER — HOSPITAL ENCOUNTER (INPATIENT)
Age: 56
LOS: 1 days | Discharge: HOME | DRG: 177 | End: 2021-09-11
Attending: EMERGENCY MEDICINE | Admitting: FAMILY MEDICINE
Payer: COMMERCIAL

## 2021-09-10 ENCOUNTER — APPOINTMENT (OUTPATIENT)
Dept: GENERAL RADIOLOGY | Age: 56
DRG: 177 | End: 2021-09-10
Payer: COMMERCIAL

## 2021-09-10 DIAGNOSIS — R09.02 HYPOXIA: Primary | ICD-10-CM

## 2021-09-10 DIAGNOSIS — U07.1 PNEUMONIA DUE TO COVID-19 VIRUS: ICD-10-CM

## 2021-09-10 DIAGNOSIS — J12.82 PNEUMONIA DUE TO COVID-19 VIRUS: ICD-10-CM

## 2021-09-10 LAB
ABSOLUTE EOS #: <0.03 K/UL (ref 0–0.44)
ABSOLUTE IMMATURE GRANULOCYTE: 0.09 K/UL (ref 0–0.3)
ABSOLUTE LYMPH #: 1.2 K/UL (ref 1.1–3.7)
ABSOLUTE MONO #: 0.53 K/UL (ref 0.1–1.2)
ALBUMIN SERPL-MCNC: 3.3 G/DL (ref 3.5–5.2)
ALBUMIN/GLOBULIN RATIO: 1 (ref 1–2.5)
ALP BLD-CCNC: 145 U/L (ref 35–104)
ALT SERPL-CCNC: 211 U/L (ref 5–33)
ANION GAP SERPL CALCULATED.3IONS-SCNC: 11 MMOL/L (ref 9–17)
AST SERPL-CCNC: 169 U/L
BASOPHILS # BLD: 0 % (ref 0–2)
BASOPHILS ABSOLUTE: <0.03 K/UL (ref 0–0.2)
BILIRUB SERPL-MCNC: 0.55 MG/DL (ref 0.3–1.2)
BILIRUBIN DIRECT: 0.18 MG/DL
BILIRUBIN, INDIRECT: 0.37 MG/DL (ref 0–1)
BUN BLDV-MCNC: 14 MG/DL (ref 6–20)
BUN/CREAT BLD: 28 (ref 9–20)
CALCIUM SERPL-MCNC: 8.9 MG/DL (ref 8.6–10.4)
CHLORIDE BLD-SCNC: 102 MMOL/L (ref 98–107)
CO2: 27 MMOL/L (ref 20–31)
CREAT SERPL-MCNC: 0.5 MG/DL (ref 0.5–0.9)
D-DIMER QUANTITATIVE: 0.86 MG/L FEU (ref 0–0.59)
DIFFERENTIAL TYPE: ABNORMAL
EKG ATRIAL RATE: 77 BPM
EKG P AXIS: 60 DEGREES
EKG P-R INTERVAL: 138 MS
EKG Q-T INTERVAL: 382 MS
EKG QRS DURATION: 94 MS
EKG QTC CALCULATION (BAZETT): 432 MS
EKG R AXIS: 7 DEGREES
EKG T AXIS: 39 DEGREES
EKG VENTRICULAR RATE: 77 BPM
EOSINOPHILS RELATIVE PERCENT: 0 % (ref 1–4)
GFR AFRICAN AMERICAN: >60 ML/MIN
GFR NON-AFRICAN AMERICAN: >60 ML/MIN
GFR SERPL CREATININE-BSD FRML MDRD: ABNORMAL ML/MIN/{1.73_M2}
GFR SERPL CREATININE-BSD FRML MDRD: ABNORMAL ML/MIN/{1.73_M2}
GLOBULIN: 3.4 G/DL (ref 1.5–3.8)
GLUCOSE BLD-MCNC: 95 MG/DL (ref 70–99)
GLUCOSE BLD-MCNC: 97 MG/DL (ref 65–105)
HCT VFR BLD CALC: 38.1 % (ref 36.3–47.1)
HEMOGLOBIN: 13 G/DL (ref 11.9–15.1)
IMMATURE GRANULOCYTES: 1 %
LACTATE DEHYDROGENASE: 263 U/L (ref 135–214)
LACTIC ACID: 1 MMOL/L (ref 0.5–2.2)
LYMPHOCYTES # BLD: 16 % (ref 24–43)
MCH RBC QN AUTO: 31.6 PG (ref 25.2–33.5)
MCHC RBC AUTO-ENTMCNC: 34.1 G/DL (ref 25.2–33.5)
MCV RBC AUTO: 92.7 FL (ref 82.6–102.9)
MONOCYTES # BLD: 7 % (ref 3–12)
NRBC AUTOMATED: 0 PER 100 WBC
PDW BLD-RTO: 12.8 % (ref 11.8–14.4)
PLATELET # BLD: 164 K/UL (ref 138–453)
PLATELET ESTIMATE: ABNORMAL
PMV BLD AUTO: 11 FL (ref 8.1–13.5)
POTASSIUM SERPL-SCNC: 3.9 MMOL/L (ref 3.7–5.3)
RBC # BLD: 4.11 M/UL (ref 3.95–5.11)
RBC # BLD: ABNORMAL 10*6/UL
SEG NEUTROPHILS: 76 % (ref 36–65)
SEGMENTED NEUTROPHILS ABSOLUTE COUNT: 5.65 K/UL (ref 1.5–8.1)
SODIUM BLD-SCNC: 140 MMOL/L (ref 135–144)
TOTAL PROTEIN: 6.7 G/DL (ref 6.4–8.3)
TROPONIN INTERP: NORMAL
TROPONIN T: NORMAL NG/ML
TROPONIN, HIGH SENSITIVITY: <6 NG/L (ref 0–14)
WBC # BLD: 7.5 K/UL (ref 3.5–11.3)
WBC # BLD: ABNORMAL 10*3/UL

## 2021-09-10 PROCEDURE — 84145 PROCALCITONIN (PCT): CPT

## 2021-09-10 PROCEDURE — 83615 LACTATE (LD) (LDH) ENZYME: CPT

## 2021-09-10 PROCEDURE — 6360000002 HC RX W HCPCS

## 2021-09-10 PROCEDURE — 80076 HEPATIC FUNCTION PANEL: CPT

## 2021-09-10 PROCEDURE — 99284 EMERGENCY DEPT VISIT MOD MDM: CPT

## 2021-09-10 PROCEDURE — 82728 ASSAY OF FERRITIN: CPT

## 2021-09-10 PROCEDURE — 6370000000 HC RX 637 (ALT 250 FOR IP): Performed by: FAMILY MEDICINE

## 2021-09-10 PROCEDURE — XW0DXM6 INTRODUCTION OF BARICITINIB INTO MOUTH AND PHARYNX, EXTERNAL APPROACH, NEW TECHNOLOGY GROUP 6: ICD-10-PCS | Performed by: FAMILY MEDICINE

## 2021-09-10 PROCEDURE — 94640 AIRWAY INHALATION TREATMENT: CPT

## 2021-09-10 PROCEDURE — 85379 FIBRIN DEGRADATION QUANT: CPT

## 2021-09-10 PROCEDURE — 82947 ASSAY GLUCOSE BLOOD QUANT: CPT

## 2021-09-10 PROCEDURE — 86140 C-REACTIVE PROTEIN: CPT

## 2021-09-10 PROCEDURE — 36415 COLL VENOUS BLD VENIPUNCTURE: CPT

## 2021-09-10 PROCEDURE — 2700000000 HC OXYGEN THERAPY PER DAY

## 2021-09-10 PROCEDURE — 80048 BASIC METABOLIC PNL TOTAL CA: CPT

## 2021-09-10 PROCEDURE — 85025 COMPLETE CBC W/AUTO DIFF WBC: CPT

## 2021-09-10 PROCEDURE — 94761 N-INVAS EAR/PLS OXIMETRY MLT: CPT

## 2021-09-10 PROCEDURE — 83605 ASSAY OF LACTIC ACID: CPT

## 2021-09-10 PROCEDURE — 6360000002 HC RX W HCPCS: Performed by: NURSE PRACTITIONER

## 2021-09-10 PROCEDURE — 84484 ASSAY OF TROPONIN QUANT: CPT

## 2021-09-10 PROCEDURE — 71045 X-RAY EXAM CHEST 1 VIEW: CPT

## 2021-09-10 PROCEDURE — 2060000000 HC ICU INTERMEDIATE R&B

## 2021-09-10 PROCEDURE — 2580000003 HC RX 258: Performed by: FAMILY MEDICINE

## 2021-09-10 PROCEDURE — 6360000002 HC RX W HCPCS: Performed by: FAMILY MEDICINE

## 2021-09-10 PROCEDURE — 93005 ELECTROCARDIOGRAM TRACING: CPT | Performed by: EMERGENCY MEDICINE

## 2021-09-10 RX ORDER — SODIUM CHLORIDE FOR INHALATION 0.9 %
3 VIAL, NEBULIZER (ML) INHALATION
Status: DISCONTINUED | OUTPATIENT
Start: 2021-09-10 | End: 2021-09-10

## 2021-09-10 RX ORDER — ALBUTEROL SULFATE 90 UG/1
2 AEROSOL, METERED RESPIRATORY (INHALATION) EVERY 4 HOURS PRN
Status: DISCONTINUED | OUTPATIENT
Start: 2021-09-10 | End: 2021-09-11 | Stop reason: HOSPADM

## 2021-09-10 RX ORDER — DEXAMETHASONE SODIUM PHOSPHATE 4 MG/ML
INJECTION, SOLUTION INTRA-ARTICULAR; INTRALESIONAL; INTRAMUSCULAR; INTRAVENOUS; SOFT TISSUE
Status: COMPLETED
Start: 2021-09-10 | End: 2021-09-10

## 2021-09-10 RX ORDER — DEXAMETHASONE SODIUM PHOSPHATE 10 MG/ML
6 INJECTION, SOLUTION INTRAMUSCULAR; INTRAVENOUS DAILY
Status: DISCONTINUED | OUTPATIENT
Start: 2021-09-10 | End: 2021-09-11 | Stop reason: HOSPADM

## 2021-09-10 RX ORDER — DIPHENHYDRAMINE HYDROCHLORIDE 50 MG/ML
25 INJECTION INTRAMUSCULAR; INTRAVENOUS ONCE
Status: COMPLETED | OUTPATIENT
Start: 2021-09-10 | End: 2021-09-10

## 2021-09-10 RX ORDER — M-VIT,TX,IRON,MINS/CALC/FOLIC 27MG-0.4MG
1 TABLET ORAL DAILY
Status: DISCONTINUED | OUTPATIENT
Start: 2021-09-10 | End: 2021-09-10 | Stop reason: CLARIF

## 2021-09-10 RX ORDER — MULTIVITAMIN WITH IRON
1 TABLET ORAL DAILY
Status: DISCONTINUED | OUTPATIENT
Start: 2021-09-11 | End: 2021-09-11 | Stop reason: HOSPADM

## 2021-09-10 RX ORDER — ALBUTEROL SULFATE 90 UG/1
2 AEROSOL, METERED RESPIRATORY (INHALATION)
Status: DISCONTINUED | OUTPATIENT
Start: 2021-09-10 | End: 2021-09-11 | Stop reason: HOSPADM

## 2021-09-10 RX ORDER — ALBUTEROL SULFATE 2.5 MG/3ML
2.5 SOLUTION RESPIRATORY (INHALATION)
Status: DISCONTINUED | OUTPATIENT
Start: 2021-09-10 | End: 2021-09-10

## 2021-09-10 RX ORDER — SODIUM CHLORIDE 0.9 % (FLUSH) 0.9 %
5-40 SYRINGE (ML) INJECTION EVERY 12 HOURS SCHEDULED
Status: DISCONTINUED | OUTPATIENT
Start: 2021-09-10 | End: 2021-09-11 | Stop reason: HOSPADM

## 2021-09-10 RX ORDER — ACETAMINOPHEN 325 MG/1
650 TABLET ORAL EVERY 4 HOURS PRN
Status: DISCONTINUED | OUTPATIENT
Start: 2021-09-10 | End: 2021-09-11 | Stop reason: HOSPADM

## 2021-09-10 RX ORDER — SODIUM CHLORIDE 0.9 % (FLUSH) 0.9 %
5-40 SYRINGE (ML) INJECTION PRN
Status: DISCONTINUED | OUTPATIENT
Start: 2021-09-10 | End: 2021-09-11 | Stop reason: HOSPADM

## 2021-09-10 RX ORDER — SODIUM CHLORIDE 9 MG/ML
25 INJECTION, SOLUTION INTRAVENOUS PRN
Status: DISCONTINUED | OUTPATIENT
Start: 2021-09-10 | End: 2021-09-11 | Stop reason: HOSPADM

## 2021-09-10 RX ORDER — ONDANSETRON 2 MG/ML
4 INJECTION INTRAMUSCULAR; INTRAVENOUS EVERY 6 HOURS PRN
Status: DISCONTINUED | OUTPATIENT
Start: 2021-09-10 | End: 2021-09-11 | Stop reason: HOSPADM

## 2021-09-10 RX ADMIN — ALBUTEROL SULFATE 2 PUFF: 90 AEROSOL, METERED RESPIRATORY (INHALATION) at 15:16

## 2021-09-10 RX ADMIN — AZITHROMYCIN MONOHYDRATE 500 MG: 500 INJECTION, POWDER, LYOPHILIZED, FOR SOLUTION INTRAVENOUS at 18:03

## 2021-09-10 RX ADMIN — ALBUTEROL SULFATE 2 PUFF: 90 AEROSOL, METERED RESPIRATORY (INHALATION) at 23:54

## 2021-09-10 RX ADMIN — SODIUM CHLORIDE, PRESERVATIVE FREE 10 ML: 5 INJECTION INTRAVENOUS at 21:33

## 2021-09-10 RX ADMIN — CEFTRIAXONE 1000 MG: 1 INJECTION, POWDER, FOR SOLUTION INTRAMUSCULAR; INTRAVENOUS at 15:31

## 2021-09-10 RX ADMIN — DEXAMETHASONE SODIUM PHOSPHATE 6 MG: 10 INJECTION, SOLUTION INTRAMUSCULAR; INTRAVENOUS at 18:05

## 2021-09-10 RX ADMIN — DIPHENHYDRAMINE HYDROCHLORIDE 25 MG: 50 INJECTION, SOLUTION INTRAMUSCULAR; INTRAVENOUS at 21:32

## 2021-09-10 RX ADMIN — ENOXAPARIN SODIUM 40 MG: 40 INJECTION SUBCUTANEOUS at 18:03

## 2021-09-10 RX ADMIN — ALBUTEROL SULFATE 2 PUFF: 90 AEROSOL, METERED RESPIRATORY (INHALATION) at 20:18

## 2021-09-10 RX ADMIN — BARICITINIB 4 MG: 2 TABLET, FILM COATED ORAL at 18:35

## 2021-09-10 RX ADMIN — DEXAMETHASONE SODIUM PHOSPHATE 8 MG: 4 INJECTION, SOLUTION INTRAMUSCULAR; INTRAVENOUS at 18:03

## 2021-09-10 ASSESSMENT — PAIN SCALES - GENERAL
PAINLEVEL_OUTOF10: 0

## 2021-09-10 NOTE — ED PROVIDER NOTES
Faustina 69      Pt Name: Amaya Garza  MRN: [id number]  Kvnggfgustabo 1965  Date of evaluation: 9/10/2021      CHIEF COMPLAINT       Chief Complaint   Patient presents with    Positive For Covid-19    Other     hypoxia         HISTORY OF PRESENT ILLNESS      The patient was brought in for hypoxia. The patient has COVID-19. She is about day 8 or 9. She just got placed on nasal cannula oxygen yesterday. She had 2 L on at home but his pulse ox was only 75%. She called the ambulance. They brought her in with nonrebreather on and she was satting 95%. The patient denies vomiting or diarrhea. She does have the Covid diagnosis. She did get the [address] [place] [address]. REVIEW OF SYSTEMS       All systems reviewed and negative unless noted in HPI. The patient denies fever today. Denies vision change. Mild rhinorrhea. Denies any neck pain or stiffness. Reports chest pain and dyspnea. Recent hypoxia as noted in HPI. No nausea,  vomiting or diarrhea. Denies any dysuria. Denies urinary frequency or hematuria. Denies musculoskeletal injury or pain. Denies any weakness, numbness or focal neurologic deficit. Denies any skin rash or edema. No recent psychiatric issues. No easy bruising or bleeding. Denies any polyuria, polydypsia or history of immunocompromise. PAST MEDICAL HISTORY    has a past medical history of Acid reflux, Anemia, GERD (gastroesophageal reflux disease), Hx of migraine headaches, and Menorrhagia. SURGICAL HISTORY      has a past surgical history that includes  section; Colonoscopy (2010); Upper gastrointestinal endoscopy (, ); Breast surgery (Right, 2014); and Taylor tooth extraction. CURRENT MEDICATIONS       Previous Medications    AZITHROMYCIN (ZITHROMAX) 250 MG TABLET    Take 2 tablets (500 mg) on Day 1, followed by 1 tablet (250 mg) once daily on Days 2 through 5.     BIOTIN W/ VITAMINS C & E (HAIR/SKIN/NAILS PO)    Take by mouth daily     MULTIPLE VITAMINS-MINERALS (THERAPEUTIC MULTIVITAMIN-MINERALS) TABLET    Take 1 tablet by mouth daily    ONDANSETRON (ZOFRAN ODT) 4 MG DISINTEGRATING TABLET    Take 1 tablet by mouth every 8 hours as needed for Nausea    OXYGEN    Inhale 1 Device into the lungs continuous 2L NC    PREDNISONE (DELTASONE) 10 MG TABLET    Take 4 tablets by mouth once daily for 5 days    PROBIOTIC PRODUCT (PROBIOTIC DAILY PO)    Take by mouth    TRIAMCINOLONE (KENALOG) 0.1 % CREAM    Apply topically 3 times daily    ZOSTER RECOMBINANT ADJUVANTED VACCINE (SHINGRIX) 50 MCG/0.5ML SUSR INJECTION    Inject 0.5 mLs into the muscle See Admin Instructions 1 dose now and repeat in 2-6 months       ALLERGIES     has No Known Allergies. FAMILY HISTORY     She indicated that her mother is alive. She indicated that her father is . She indicated that both of her brothers are alive. She indicated that her maternal grandfather is . She indicated that her paternal grandmother is . She indicated that her paternal grandfather is . She indicated that her daughter is alive. She indicated that her son is alive. She indicated that her maternal cousin is . family history includes Arthritis in her mother; Asthma in her brother; Cancer in her brother and mother; Elevated Lipids in her father; Heart Disease in her maternal grandfather, mother, and paternal grandmother; Heart Disease (age of onset: 80) in her paternal grandfather; High Blood Pressure in her father and mother; High Cholesterol in her mother; Hypertension in her father; Desma Niels in her father; Lon Parrish / Ana in her mother; Other in her brother, daughter, father, maternal grandfather, and son. SOCIAL HISTORY      reports that she has never smoked. She has never used smokeless tobacco. She reports current alcohol use of about 18.0 standard drinks of alcohol per week.  She Acuity: Acute   Type of Exam: Initial     FINDINGS:   The cardiac and mediastinal contours appear within normal limits.  Patchy   bilateral airspace disease is noted and slightly more prominent in the   interval, notably the left lung.  No pneumothorax, evidence for edema or   effusion.  No acute osseous abnormality identified.                       LABS:  Results for orders placed or performed during the hospital encounter of 09/10/21   CBC Auto Differential   Result Value Ref Range    WBC 7.5 3.5 - 11.3 k/uL    RBC 4.11 3.95 - 5.11 m/uL    Hemoglobin 13.0 11.9 - 15.1 g/dL    Hematocrit 38.1 36.3 - 47.1 %    MCV 92.7 82.6 - 102.9 fL    MCH 31.6 25.2 - 33.5 pg    MCHC 34.1 (H) 25.2 - 33.5 g/dL    RDW 12.8 11.8 - 14.4 %    Platelets 996 336 - 927 k/uL    MPV 11.0 8.1 - 13.5 fL    NRBC Automated 0.0 0.0 per 100 WBC    Differential Type NOT REPORTED     Seg Neutrophils 76 (H) 36 - 65 %    Lymphocytes 16 (L) 24 - 43 %    Monocytes 7 3 - 12 %    Eosinophils % 0 (L) 1 - 4 %    Basophils 0 0 - 2 %    Immature Granulocytes 1 (H) 0 %    Segs Absolute 5.65 1.50 - 8.10 k/uL    Absolute Lymph # 1.20 1.10 - 3.70 k/uL    Absolute Mono # 0.53 0.10 - 1.20 k/uL    Absolute Eos # <0.03 0.00 - 0.44 k/uL    Basophils Absolute <0.03 0.00 - 0.20 k/uL    Absolute Immature Granulocyte 0.09 0.00 - 0.30 k/uL    WBC Morphology NOT REPORTED     RBC Morphology NOT REPORTED     Platelet Estimate NOT REPORTED    Basic Metabolic Panel   Result Value Ref Range    Glucose 95 70 - 99 mg/dL    BUN 14 6 - 20 mg/dL    CREATININE 0.50 0.50 - 0.90 mg/dL    Bun/Cre Ratio 28 (H) 9 - 20    Calcium 8.9 8.6 - 10.4 mg/dL    Sodium 140 135 - 144 mmol/L    Potassium 3.9 3.7 - 5.3 mmol/L    Chloride 102 98 - 107 mmol/L    CO2 27 20 - 31 mmol/L    Anion Gap 11 9 - 17 mmol/L    GFR Non-African American >60 >60 mL/min    GFR African American >60 >60 mL/min    GFR Comment          GFR Staging NOT REPORTED    Troponin   Result Value Ref Range    Troponin, High Sensitivity <6 0 - 14 ng/L    Troponin T NOT REPORTED <0.03 ng/mL    Troponin Interp NOT REPORTED    Hepatic Function Panel   Result Value Ref Range    Albumin 3.3 (L) 3.5 - 5.2 g/dL    Alkaline Phosphatase 145 (H) 35 - 104 U/L     (H) 5 - 33 U/L     (H) <32 U/L    Total Bilirubin 0.55 0.3 - 1.2 mg/dL    Bilirubin, Direct 0.18 <0.31 mg/dL    Bilirubin, Indirect 0.37 0.00 - 1.00 mg/dL    Total Protein 6.7 6.4 - 8.3 g/dL    Globulin 3.4 1.5 - 3.8 g/dL    Albumin/Globulin Ratio 1.0 1.0 - 2.5   EKG 12 Lead   Result Value Ref Range    Ventricular Rate 77 BPM    Atrial Rate 77 BPM    P-R Interval 138 ms    QRS Duration 94 ms    Q-T Interval 382 ms    QTc Calculation (Bazett) 432 ms    P Axis 60 degrees    R Axis 7 degrees    T Axis 39 degrees         EMERGENCY DEPARTMENT COURSE:   Vitals:    Vitals:    09/10/21 0846 09/10/21 0901 09/10/21 1004   Pulse: 86     Temp: 98.6 °F (37 °C)     TempSrc: Tympanic     SpO2:  93% 97%     -------------------------   , Temp: 98.6 °F (37 °C), Pulse: 86,        Re-evaluation Notes    The patient presents with hypoxia. She was on home oxygen but is requiring extra oxygen. She is doing well with a high flow nasal cannula with humidification. There is no evidence of ACS. We will admit her here in [place] is soon as a bed becomes available. CONSULTS:    2063  Hospitalist paged. [phone]  Discussed with Dr. Maura Underwood. Accepts admission. FINAL IMPRESSION      1. Hypoxia    2. Pneumonia due to COVID-19 virus          DISPOSITION/PLAN   DISPOSITION Decision To Admit 09/10/2021 09:56:47 AM      Condition on Disposition    stable    PATIENT REFERRED TO:  No follow-up provider specified.     DISCHARGE MEDICATIONS:  New Prescriptions    No medications on file       (Please note that portions of this note were completed with a voice recognition program.  Efforts were made to edit the dictations but occasionally words are mis-transcribed.)    Clifford Smith MD,, MD   Attending Emergency Physician         Yarely Biggs MD  09/11/21 5410

## 2021-09-11 ENCOUNTER — APPOINTMENT (OUTPATIENT)
Dept: GENERAL RADIOLOGY | Age: 56
DRG: 177 | End: 2021-09-11
Payer: COMMERCIAL

## 2021-09-11 VITALS
SYSTOLIC BLOOD PRESSURE: 135 MMHG | HEIGHT: 63 IN | OXYGEN SATURATION: 93 % | WEIGHT: 157 LBS | DIASTOLIC BLOOD PRESSURE: 94 MMHG | RESPIRATION RATE: 22 BRPM | BODY MASS INDEX: 27.82 KG/M2 | TEMPERATURE: 98.6 F | HEART RATE: 84 BPM

## 2021-09-11 LAB
ABSOLUTE EOS #: <0.03 K/UL (ref 0–0.44)
ABSOLUTE IMMATURE GRANULOCYTE: 0.15 K/UL (ref 0–0.3)
ABSOLUTE LYMPH #: 2.16 K/UL (ref 1.1–3.7)
ABSOLUTE MONO #: 0.51 K/UL (ref 0.1–1.2)
ALBUMIN SERPL-MCNC: 3.5 G/DL (ref 3.5–5.2)
ALBUMIN/GLOBULIN RATIO: 1 (ref 1–2.5)
ALP BLD-CCNC: 147 U/L (ref 35–104)
ALT SERPL-CCNC: 180 U/L (ref 5–33)
ANION GAP SERPL CALCULATED.3IONS-SCNC: 11 MMOL/L (ref 9–17)
AST SERPL-CCNC: 100 U/L
BASOPHILS # BLD: 0 % (ref 0–2)
BASOPHILS ABSOLUTE: <0.03 K/UL (ref 0–0.2)
BILIRUB SERPL-MCNC: 0.57 MG/DL (ref 0.3–1.2)
BUN BLDV-MCNC: 15 MG/DL (ref 6–20)
BUN/CREAT BLD: 22 (ref 9–20)
CALCIUM SERPL-MCNC: 8.9 MG/DL (ref 8.6–10.4)
CHLORIDE BLD-SCNC: 99 MMOL/L (ref 98–107)
CO2: 30 MMOL/L (ref 20–31)
CREAT SERPL-MCNC: 0.68 MG/DL (ref 0.5–0.9)
DIFFERENTIAL TYPE: ABNORMAL
EOSINOPHILS RELATIVE PERCENT: 0 % (ref 1–4)
GFR AFRICAN AMERICAN: >60 ML/MIN
GFR NON-AFRICAN AMERICAN: >60 ML/MIN
GFR SERPL CREATININE-BSD FRML MDRD: ABNORMAL ML/MIN/{1.73_M2}
GFR SERPL CREATININE-BSD FRML MDRD: ABNORMAL ML/MIN/{1.73_M2}
GLUCOSE BLD-MCNC: 88 MG/DL (ref 70–99)
HCT VFR BLD CALC: 40.5 % (ref 36.3–47.1)
HEMOGLOBIN: 13.4 G/DL (ref 11.9–15.1)
IMMATURE GRANULOCYTES: 3 %
LYMPHOCYTES # BLD: 37 % (ref 24–43)
MCH RBC QN AUTO: 31.5 PG (ref 25.2–33.5)
MCHC RBC AUTO-ENTMCNC: 33.1 G/DL (ref 25.2–33.5)
MCV RBC AUTO: 95.3 FL (ref 82.6–102.9)
MONOCYTES # BLD: 9 % (ref 3–12)
NRBC AUTOMATED: 0 PER 100 WBC
PDW BLD-RTO: 12.8 % (ref 11.8–14.4)
PLATELET # BLD: 188 K/UL (ref 138–453)
PLATELET ESTIMATE: ABNORMAL
PMV BLD AUTO: 10.7 FL (ref 8.1–13.5)
POTASSIUM SERPL-SCNC: 4.1 MMOL/L (ref 3.7–5.3)
RBC # BLD: 4.25 M/UL (ref 3.95–5.11)
RBC # BLD: ABNORMAL 10*6/UL
SEG NEUTROPHILS: 51 % (ref 36–65)
SEGMENTED NEUTROPHILS ABSOLUTE COUNT: 2.94 K/UL (ref 1.5–8.1)
SODIUM BLD-SCNC: 140 MMOL/L (ref 135–144)
TOTAL PROTEIN: 6.9 G/DL (ref 6.4–8.3)
WBC # BLD: 5.8 K/UL (ref 3.5–11.3)
WBC # BLD: ABNORMAL 10*3/UL

## 2021-09-11 PROCEDURE — 94640 AIRWAY INHALATION TREATMENT: CPT

## 2021-09-11 PROCEDURE — 6370000000 HC RX 637 (ALT 250 FOR IP): Performed by: FAMILY MEDICINE

## 2021-09-11 PROCEDURE — 80053 COMPREHEN METABOLIC PANEL: CPT

## 2021-09-11 PROCEDURE — 2580000003 HC RX 258: Performed by: FAMILY MEDICINE

## 2021-09-11 PROCEDURE — 94761 N-INVAS EAR/PLS OXIMETRY MLT: CPT

## 2021-09-11 PROCEDURE — 6360000002 HC RX W HCPCS

## 2021-09-11 PROCEDURE — 2700000000 HC OXYGEN THERAPY PER DAY

## 2021-09-11 PROCEDURE — 99222 1ST HOSP IP/OBS MODERATE 55: CPT | Performed by: FAMILY MEDICINE

## 2021-09-11 PROCEDURE — 85025 COMPLETE CBC W/AUTO DIFF WBC: CPT

## 2021-09-11 PROCEDURE — 71045 X-RAY EXAM CHEST 1 VIEW: CPT

## 2021-09-11 PROCEDURE — 6360000002 HC RX W HCPCS: Performed by: FAMILY MEDICINE

## 2021-09-11 PROCEDURE — 36415 COLL VENOUS BLD VENIPUNCTURE: CPT

## 2021-09-11 PROCEDURE — 93005 ELECTROCARDIOGRAM TRACING: CPT | Performed by: FAMILY MEDICINE

## 2021-09-11 RX ORDER — DEXAMETHASONE 6 MG/1
6 TABLET ORAL 2 TIMES DAILY WITH MEALS
Qty: 20 TABLET | Refills: 0 | Status: SHIPPED | OUTPATIENT
Start: 2021-09-11 | End: 2021-09-21 | Stop reason: ALTCHOICE

## 2021-09-11 RX ORDER — DEXAMETHASONE SODIUM PHOSPHATE 4 MG/ML
INJECTION, SOLUTION INTRA-ARTICULAR; INTRALESIONAL; INTRAMUSCULAR; INTRAVENOUS; SOFT TISSUE
Status: COMPLETED
Start: 2021-09-11 | End: 2021-09-11

## 2021-09-11 RX ORDER — AZITHROMYCIN 250 MG/1
500 TABLET, FILM COATED ORAL
Status: DISCONTINUED | OUTPATIENT
Start: 2021-09-11 | End: 2021-09-11 | Stop reason: HOSPADM

## 2021-09-11 RX ORDER — ALBUTEROL SULFATE 90 UG/1
2 AEROSOL, METERED RESPIRATORY (INHALATION) EVERY 4 HOURS PRN
Qty: 18 G | Refills: 3 | Status: ON HOLD | OUTPATIENT
Start: 2021-09-11 | End: 2022-10-21

## 2021-09-11 RX ADMIN — BARICITINIB 4 MG: 2 TABLET, FILM COATED ORAL at 09:04

## 2021-09-11 RX ADMIN — DEXAMETHASONE SODIUM PHOSPHATE 6 MG: 4 INJECTION, SOLUTION INTRAMUSCULAR; INTRAVENOUS at 09:47

## 2021-09-11 RX ADMIN — ALBUTEROL SULFATE 2 PUFF: 90 AEROSOL, METERED RESPIRATORY (INHALATION) at 12:22

## 2021-09-11 RX ADMIN — ENOXAPARIN SODIUM 40 MG: 40 INJECTION SUBCUTANEOUS at 09:02

## 2021-09-11 RX ADMIN — SODIUM CHLORIDE, PRESERVATIVE FREE 10 ML: 5 INJECTION INTRAVENOUS at 09:08

## 2021-09-11 RX ADMIN — ALBUTEROL SULFATE 2 PUFF: 90 AEROSOL, METERED RESPIRATORY (INHALATION) at 04:01

## 2021-09-11 RX ADMIN — ALBUTEROL SULFATE 2 PUFF: 90 AEROSOL, METERED RESPIRATORY (INHALATION) at 08:40

## 2021-09-11 RX ADMIN — THERA TABS 1 TABLET: TAB at 09:02

## 2021-09-11 RX ADMIN — DEXAMETHASONE SODIUM PHOSPHATE 6 MG: 10 INJECTION, SOLUTION INTRAMUSCULAR; INTRAVENOUS at 09:47

## 2021-09-11 ASSESSMENT — PAIN SCALES - GENERAL
PAINLEVEL_OUTOF10: 0

## 2021-09-11 NOTE — DISCHARGE SUMMARY
Hospitalist Discharge Summary    Molly Lee  :  1965  MRN:  [id number]    Admit date:  9/10/2021  Discharge date:  21    Admitting Physician:  Shelby Cristobal MD    Discharge Diagnoses:   Patient Active Problem List   Diagnosis    Acid reflux    Hx of migraine headaches    Menorrhagia    COVID-19        Admission Condition:  fair      Discharged Condition:  good    Hospital Course/Treatments   Admitted with covid positive  o second of this month, pt has had multiple visit to hospital, pt was admitted and antibiotics were started, olumiant 4mg was started, along with decadron, pt did well, pt not on oxygen, pt o2 sat staying at 92 to 94 %  Oxygen pt will be d/c with following meds    Discharge Medications:       Naa Grout   Home Medication Instructions [id number]    Printed on:21 4837   Medication Information                      albuterol sulfate  (90 Base) MCG/ACT inhaler  Inhale 2 puffs into the lungs every 4 hours as needed for Wheezing or Shortness of Breath             azithromycin (ZITHROMAX) 250 MG tablet  Take 2 tablets (500 mg) on Day 1, followed by 1 tablet (250 mg) once daily on Days 2 through 5.              Biotin w/ Vitamins C & E (HAIR/SKIN/NAILS PO)  Take by mouth daily              dexamethasone (DECADRON) 6 MG tablet  Take 1 tablet by mouth 2 times daily (with meals) for 10 days             Multiple Vitamins-Minerals (THERAPEUTIC MULTIVITAMIN-MINERALS) tablet  Take 1 tablet by mouth daily             ondansetron (ZOFRAN ODT) 4 MG disintegrating tablet  Take 1 tablet by mouth every 8 hours as needed for Nausea             OXYGEN  Inhale 1 Device into the lungs continuous 2L NC             Probiotic Product (PROBIOTIC DAILY PO)  Take by mouth             triamcinolone (KENALOG) 0.1 % cream  Apply topically 3 times daily                 Consults:  IP CONSULT TO HOSPITALIST    Significant Diagnostic Studies:  XR CHEST PORTABLE    Result Date: 9/11/2021  EXAMINATION: ONE XRAY VIEW OF THE CHEST 9/11/2021 7:00 am COMPARISON: 09/10/2021, 918 hours HISTORY: ORDERING SYSTEM PROVIDED HISTORY: covid TECHNOLOGIST PROVIDED HISTORY: covid Reason for Exam: Covid Acuity: Acute Type of Exam: Subsequent/Follow-up 59-year-old female with history of COVID; follow-up FINDINGS: Portable upright view of the chest. Cardiac monitor leads overlie the chest. Trachea midline. No pneumothorax. No free air. Bilateral multifocal airspace disease with a peripheral and lower zone predominance. Small bilateral pleural effusions. No new focal airspace consolidation. Cardiac and mediastinal contours remain unchanged. Mild cardiomegaly. Visualized osseous structures remain unchanged. Small bilateral pleural effusions. Multifocal pneumonia consistent with patient's underlying history of COVID. Follow-up is recommended to document resolution. XR CHEST PORTABLE    Result Date: 9/10/2021  EXAMINATION: ONE XRAY VIEW OF THE CHEST 9/10/2021 9:27 am COMPARISON: 09/07/2021, 09/02/2021 HISTORY: ORDERING SYSTEM PROVIDED HISTORY: covid positive TECHNOLOGIST PROVIDED HISTORY: covid positive Reason for Exam: Covid Acuity: Acute Type of Exam: Initial FINDINGS: The cardiac and mediastinal contours appear within normal limits. Patchy bilateral airspace disease is noted and slightly more prominent in the interval, notably the left lung. No pneumothorax, evidence for edema or effusion. No acute osseous abnormality identified. Patchy bilateral airspace disease has minimally increased since 09/07/2021 exam.       Disposition:   home    Discharge Instructions: Activity: activity as tolerated  Diet:  regular diet    Follow up with MALACHI LYNCH in 2 weeks.     Signed:  Gabe Santos MD  9/11/2021, 3:57 PM    Time spent in discharge of this pt is more than 30 minutes in examination,evaluvation,  counseling and review of medication and discharge plan

## 2021-09-11 NOTE — H&P
Hospital Medicine  History and Physical                                                                                                                                                 Full Code    Patient:  Elina Flannery  MRN: [id number]                                                                                                                                                                     CHIEF COMPLAINT:  Sob/positive for covid    History Obtained From:  patient  PCP: MALACHI Desouza    HISTORY OF PRESENT ILLNESS:   The patient is a 64 y.o. female who presents with h/o of  Near syncope, pt was positive for covid and has been feeling sob, pt was diagnosed on the second, pt was having some diarrhea, pt also was diaphoretic, pt was seen in ER . Past Medical History:        Diagnosis Date    Acid reflux     Anemia     GERD (gastroesophageal reflux disease)     Hx of migraine headaches     Menorrhagia        Past Surgical History:        Procedure Laterality Date    BREAST SURGERY Right 01/01/2014    right breast biopsy negative ? Dr. Kimmy Mercer COLONOSCOPY  12/28/2010    UPPER GASTROINTESTINAL ENDOSCOPY  2005, 2010    neg    WISDOM TOOTH EXTRACTION         Medications Prior to Admission:    Prior to Admission medications    Medication Sig Start Date End Date Taking?  Authorizing Provider   azithromycin (ZITHROMAX) 250 MG tablet Take 2 tablets (500 mg) on Day 1, followed by 1 tablet (250 mg) once daily on Days 2 through 5. 9/7/21 9/17/21 Yes Eduardo Bardales MD   predniSONE (DELTASONE) 10 MG tablet Take 4 tablets by mouth once daily for 5 days 9/7/21 9/17/21 Yes Eduardo Bardales MD   ondansetron (ZOFRAN ODT) 4 MG disintegrating tablet Take 1 tablet by mouth every 8 hours as needed for Nausea 9/7/21  Yes Eduardo Bardales MD   triamcinolone (KENALOG) 0.1 % cream Apply topically 3 times daily 7/30/21  Yes Aman Moran, [address]   Probiotic Product (PROBIOTIC DAILY PO) Take by mouth   Yes Historical Provider, MD   Multiple Vitamins-Minerals (THERAPEUTIC MULTIVITAMIN-MINERALS) tablet Take 1 tablet by mouth daily   Yes Historical Provider, MD   Biotin w/ Vitamins C & E (HAIR/SKIN/NAILS PO) Take by mouth daily    Yes Historical Provider, MD   OXYGEN Inhale 1 Device into the lungs continuous 2L NC 9/9/21   MALACHI Alexander   zoster recombinant adjuvanted vaccine [facility]) 50 MCG/0.5ML SUSR injection Inject 0.5 mLs into the muscle See Admin Instructions 1 dose now and repeat in 2-6 months 4/19/21 10/16/21  MALACHI Alexander       Current meds  Scheduled Meds:   azithromycin  500 mg Oral Dinner    cefTRIAXone (ROCEPHIN) IV  1,000 mg IntraVENous Q24H    dexamethasone (PF)  6 mg IntraVENous Daily    sodium chloride flush  5-40 mL IntraVENous 2 times per day    enoxaparin  40 mg SubCUTAneous Daily    albuterol sulfate HFA  2 puff Inhalation 6 times per day    multivitamin  1 tablet Oral Daily    baricitinib  4 mg Oral Daily     Continuous Infusions:   sodium chloride       PRN Meds:.sodium chloride flush, sodium chloride, acetaminophen, ondansetron, albuterol sulfate HFA    Allergies:  Patient has no known allergies. Social History:   TOBACCO:   reports that she has never smoked. She has never used smokeless tobacco.  ETOH:   reports current alcohol use of about 18.0 standard drinks of alcohol per week.   OCCUPATION:      Family History:       Problem Relation Age of Onset    Other Maternal Grandfather         dementia  bio grandmother    Heart Disease Maternal Grandfather     Hypertension Father     Elevated Lipids Father     Other Father         pulmonary fibrosis    Lung Cancer Father     High Blood Pressure Father     Cancer Brother         kidney    Arthritis Mother         one knee replaced    High Cholesterol Mother         manages mostly with diet    Miscarriages / Stillbirths Mother         stillbirth at 42 weeks    Heart Disease Mother    [facility] High Blood Pressure Mother     Cancer Mother         skin CA    Asthma Brother     Other Brother         bicuspid aortic valve    Heart Disease Paternal Grandfather 80    Heart Disease Paternal Grandmother     Other Son         lymphomatoid papullosis [facility] at [facility].  Other Daughter         lymphatoid papulosus       REVIEW OF SYSTEMS:  Constitutional:  negative for  fevers, chills, sweats and weight loss  HEENT:  negative for  hearing loss, ear drainage, nasal congestion, snoring, hoarseness and voice change  Neck:  No swollen glands and No h/o goiter or thyroid disease  Cardiac:  negative for  chest pain, dyspnea, palpitations, orthopnea, PND, edema  Respiratory:  negative for  dry cough, cough with sputum, dyspnea, wheezing and hemoptysis  Gastrointestinal:  negative for nausea, vomiting, change in bowel habits, diarrhea, constipation, abdominal pain and hemtochezia  :  negative for frequency, dysuria, urinary incontinence, hesitancy, decreased stream and hematuria  Musculoskeletal:  negative for  myalgias, arthralgias, joint swelling and stiff joints  Neuro:  negative for headaches, dizziness, seizures, memory problems, visual disturbance, weakness and syncope      Physical Exam:    Vitals: /84   Pulse 77   Temp 97.6 °F (36.4 °C)   Resp 16   Ht 5' 3\" (1.6 m)   Wt 157 lb (71.2 kg)   LMP 11/02/2015 (Approximate)   SpO2 93%   BMI 27.81 kg/m²   General appearance: alert, appears stated age and cooperative  Skin: Skin color, texture, turgor normal. No rashes or lesions  HEENT: Head: Normocephalic, no lesions, without obvious abnormality.   Eye: Normal external eye, conjunctiva, lids cornea, BRODIE  Neck: no adenopathy, no carotid bruit, no JVD, supple, symmetrical, trachea midline and thyroid not enlarged, symmetric, no tenderness/mass/nodules  Lungs: clear to auscultation bilaterally  Heart: regular rate and rhythm, S1, S2 normal, no murmur, click, rub or gallop  Abdomen: soft, non-tender; bowel sounds normal; no masses,  no organomegaly  Extremities: extremities normal, atraumatic, no cyanosis or edema  Neurologic: Mental status: Alert, oriented, thought content appropriate    CBC:   Recent Labs     09/10/21  0915 09/11/21  0443   WBC 7.5 5.8   HGB 13.0 13.4    188     BMP:    Recent Labs     09/10/21  0915 09/11/21  0443    140   K 3.9 4.1    99   CO2 27 30   BUN 14 15   CREATININE 0.50 0.68   GLUCOSE 95 88     Hepatic:   Recent Labs     09/10/21  0915 09/11/21  0443   * 100*   * 180*   BILITOT 0.55 0.57   ALKPHOS 145* 147*     Troponin: No results for input(s): TROPONINI in the last 72 hours. BNP: No results for input(s): BNP in the last 72 hours. Lipids: No results for input(s): CHOL, HDL in the last 72 hours. Invalid input(s): LDLCALCU  ABGs: No results found for: PHART, PO2ART, IWV9EAM  INR: No results for input(s): INR in the last 72 hours. -----------------------------------------------------------------  PA/lat CXR: XR CHEST PORTABLE    Result Date: 9/11/2021  EXAMINATION: ONE XRAY VIEW OF THE CHEST 9/11/2021 7:00 am COMPARISON: 09/10/2021, 918 hours HISTORY: ORDERING SYSTEM PROVIDED HISTORY: covid TECHNOLOGIST PROVIDED HISTORY: covid Reason for Exam: Covid Acuity: Acute Type of Exam: Subsequent/Follow-up 51-year-old female with history of COVID; follow-up FINDINGS: Portable upright view of the chest. Cardiac monitor leads overlie the chest. Trachea midline. No pneumothorax. No free air. Bilateral multifocal airspace disease with a peripheral and lower zone predominance. Small bilateral pleural effusions. No new focal airspace consolidation. Cardiac and mediastinal contours remain unchanged. Mild cardiomegaly. Visualized osseous structures remain unchanged. Small bilateral pleural effusions. Multifocal pneumonia consistent with patient's underlying history of COVID. Follow-up is recommended to document resolution.      XR CHEST PORTABLE    Result Date: 9/10/2021  EXAMINATION: ONE XRAY VIEW OF THE CHEST 9/10/2021 9:27 am COMPARISON: 09/07/2021, 09/02/2021 HISTORY: ORDERING SYSTEM PROVIDED HISTORY: covid positive TECHNOLOGIST PROVIDED HISTORY: covid positive Reason for Exam: Covid Acuity: Acute Type of Exam: Initial FINDINGS: The cardiac and mediastinal contours appear within normal limits. Patchy bilateral airspace disease is noted and slightly more prominent in the interval, notably the left lung. No pneumothorax, evidence for edema or effusion. No acute osseous abnormality identified. Patchy bilateral airspace disease has minimally increased since 09/07/2021 exam.       EKG: no acute changes     Prophylaxis:   DVT with  [x] lovenox        [] heparin        [] Scd        [] none:     Assessment and Plan   1. covid 19/droplet isolation/antibiotics/olumiant/    Patient Active Problem List   Diagnosis Code    Acid reflux K21.9    Hx of migraine headaches Z86.69    Menorrhagia N92.0    COVID-19 U07.1       Anticipated Disposition upon discharge: [] Home                                                                         [] Home with Home Health                                                                         [] [facility]                                                                         [] [address]          Patient is admitted as inpatient status because of co-morbidities listed above, severity of signs and symptoms as outlined, requirement for current medical therapies and most importantly because of direct risk to patient if care not provided in a hospital setting.     Gina Brambila MD, MD  Admitting Hospitalist

## 2021-09-11 NOTE — PLAN OF CARE
rhythm  9/11/2021 0115 by Skylar Phan RN  Outcome: Ongoing  9/11/2021 0115 by Skylar Phan RN  Outcome: Ongoing  Goal: Maintain absence of muscle cramping  9/11/2021 0115 by Skylar Phan RN  Outcome: Ongoing  9/11/2021 0115 by Skylar Phan RN  Outcome: Ongoing  Goal: Maintain normal serum potassium, sodium, calcium, phosphorus, and pH  9/11/2021 0115 by Skylar Phan RN  Outcome: Ongoing  9/11/2021 0115 by Skylar Phan RN  Outcome: Ongoing     Problem: Loneliness or Risk for Loneliness  Goal: Demonstrate positive use of time alone when socialization is not possible  9/11/2021 0115 by Skylar Phan RN  Outcome: Ongoing  9/11/2021 0115 by Skylar Phan RN  Outcome: Ongoing     Problem: Fatigue  Goal: Verbalize increase energy and improved vitality  9/11/2021 0115 by Skylar Phan RN  Outcome: Ongoing  9/11/2021 0115 by Skylar Phan RN  Outcome: Ongoing     Problem: Patient Education: Go to Patient Education Activity  Goal: Patient/Family Education  9/11/2021 0115 by Skylar Phan RN  Outcome: Ongoing  9/11/2021 0115 by Skylar Phan RN  Outcome: Ongoing     Problem: Discharge Planning:  Goal: Discharged to appropriate level of care  Description: Discharged to appropriate level of care  Outcome: Ongoing

## 2021-09-12 LAB
C-REACTIVE PROTEIN: 112.6 MG/L (ref 0–5)
FERRITIN: 566 UG/L (ref 13–150)
PROCALCITONIN: 0.04 NG/ML

## 2021-09-13 ENCOUNTER — CARE COORDINATION (OUTPATIENT)
Dept: CASE MANAGEMENT | Age: 56
End: 2021-09-13

## 2021-09-13 ENCOUNTER — TELEPHONE (OUTPATIENT)
Dept: FAMILY MEDICINE CLINIC | Age: 56
End: 2021-09-13

## 2021-09-13 DIAGNOSIS — U07.1 COVID-19: Primary | ICD-10-CM

## 2021-09-13 LAB
EKG ATRIAL RATE: 78 BPM
EKG P AXIS: 50 DEGREES
EKG P-R INTERVAL: 146 MS
EKG Q-T INTERVAL: 396 MS
EKG QRS DURATION: 94 MS
EKG QTC CALCULATION (BAZETT): 451 MS
EKG R AXIS: -4 DEGREES
EKG T AXIS: 13 DEGREES
EKG VENTRICULAR RATE: 78 BPM

## 2021-09-13 NOTE — TELEPHONE ENCOUNTER
Lindy 45 Transitions Initial Follow Up Call    Outreach made within 2 business days of discharge: Yes    Patient: Jason Ortiz Patient : 1965   MRN: [id number]  Reason for Admission: There are no discharge diagnoses documented for the most recent discharge. Discharge Date: 21       Spoke with: Pietro Alonso    Discharge department/facility: [facility]    TCM Interactive Patient Contact:  Was patient able to fill all prescriptions: Yes  Was patient instructed to bring all medications to the follow-up visit: Yes  Is patient taking all medications as directed in the discharge summary? Yes  Does patient understand their discharge instructions: Yes  Does patient have questions or concerns that need addressed prior to 7-14 day follow up office visit: no patient states becomes winded easily but feels much better . Tries to get up and move short distance. No she just needs to take baby steps and will call if any issues.     Scheduled appointment with PCP within 7-14 days    Follow Up  Future Appointments   Date Time Provider Chapito Hood   2021  9:00 AM Karen Ruiz [facility]   2022  8:00 AM Karen Ruiz DPNEWTON Cardenas LPN

## 2021-09-13 NOTE — CARE COORDINATION
Lindy 45 Transitions Initial Follow Up Call    Call within 2 business days of discharge: Yes    Patient: Asim Bill Patient : 1965   MRN: [id number]  Reason for Admission: Hypoxia  Discharge Date: 21 RARS: Readmission Risk Score: 11      Last Discharge [address] [address]       Complaint Diagnosis Description Type Department Provider    9/10/21 Positive For Covid-19; Other Hypoxia . .. ED to Hosp-Admission (Discharged) (ADMITTED) Ella Cintron MD; Bruce Hastings . .. Spoke with: Patient     Facility: [address] [place] [address] services provided:  Scheduled appointment with PCP- VV  Obtained and reviewed discharge summary and/or continuity of care documents     Spoke with patient who c/o SCOTT. She has O2 to use if needed, O2 saturation was 92-93% on RA when leaving the hospital but O2 was ordered previously that week by her PCP. She has a VV on Thursday with her PCP. She denies any f/c, n/v, cough or other symptoms at this time. She was vaccinated earlier this year. She denies any needs or concerns. Transitions of Care Initial Call    Was this an external facility discharge? No     Challenges to be reviewed by the provider   Additional needs identified to be addressed with provider: No  none             Method of communication with provider : none      Advance Care Planning:   Does patient have an Advance Directive: not on file. Was this a readmission? No  Patient stated reason for admission: short of breath  Patients top risk factors for readmission: medical condition-COVID    Care Transition Nurse (CTN) contacted the patient by telephone to perform post hospital discharge assessment. Verified name and  with patient as identifiers. Provided introduction to self, and explanation of the CTN role. CTN reviewed discharge instructions, medical action plan and red flags with patient who verbalized understanding.  Patient given an opportunity to ask questions and does not have any further questions or concerns at this time. Were discharge instructions available to patient? Yes. Reviewed appropriate site of care based on symptoms and resources available to patient including: PCP. The patient agrees to contact the PCP office for questions related to their healthcare. Medication reconciliation was performed with patient, who verbalizes understanding of administration of home medications. Advised obtaining a 90-day supply of all daily and as-needed medications. Covid Risk Education     Educated patient about risk for severe COVID-19 due to risk factors according to CDC guidelines. CTN reviewed discharge instructions, medical action plan and red flag symptoms with the patient who verbalized understanding. Discussed COVID vaccination status: Yes. Education provided on COVID-19 vaccination as appropriate. Discussed exposure protocols and quarantine with CDC Guidelines. Patient was given an opportunity to verbalize any questions and concerns and agrees to contact CTN or health care provider for questions related to their healthcare. Reviewed and educated patient on any new and changed medications related to discharge diagnosis. Was patient discharged with a pulse oximeter? No Discussed and confirmed pulse oximeter discharge instructions and when to notify provider or seek emergency care. CTN provided contact information. Plan for follow-up call in 5-7 days based on severity of symptoms and risk factors.   Plan for next call: Routine follow up        Care Transitions 24 Hour Call    Schedule Follow Up Appointment with PCP: Completed  Do you have any ongoing symptoms?: Yes  Patient-reported symptoms: Shortness of Breath  Do you have a copy of your discharge instructions?: Yes  Do you have all of your prescriptions and are they filled?: Yes  Have you been contacted by a [facility] Pharmacist?: No  Have you scheduled your follow up appointment?: Yes  How are you going to get to your appointment?: Other (Comment: VV)  Were you discharged with any Home Care or Post Acute Services: No  Do you feel like you have everything you need to keep you well at home?: Yes  Care Transitions Interventions         Follow Up  Future Appointments   Date Time Provider Chapito Hood   9/16/2021  9:00 AM Karen Bobo [facility]   4/19/2022  8:00 AM Karen Bobo [facility]       Tiffanie Jamil RN

## 2021-09-16 ENCOUNTER — VIRTUAL VISIT (OUTPATIENT)
Dept: FAMILY MEDICINE CLINIC | Age: 56
End: 2021-09-16
Payer: COMMERCIAL

## 2021-09-16 DIAGNOSIS — U07.1 PNEUMONIA DUE TO COVID-19 VIRUS: Primary | ICD-10-CM

## 2021-09-16 DIAGNOSIS — J12.82 PNEUMONIA DUE TO COVID-19 VIRUS: Primary | ICD-10-CM

## 2021-09-16 PROCEDURE — 99495 TRANSJ CARE MGMT MOD F2F 14D: CPT | Performed by: PHYSICIAN ASSISTANT

## 2021-09-16 PROCEDURE — 1111F DSCHRG MED/CURRENT MED MERGE: CPT | Performed by: PHYSICIAN ASSISTANT

## 2021-09-18 ASSESSMENT — ENCOUNTER SYMPTOMS
COUGH: 1
GASTROINTESTINAL NEGATIVE: 1
SHORTNESS OF BREATH: 1

## 2021-09-18 NOTE — PROGRESS NOTES
Post-Discharge Transitional Care Management Services or Hospital Follow Up      Yue Garsia   YOB: 1965    Date of Office Visit:  9/16/2021  Date of Hospital Admission: 9/10/21  Date of Hospital Discharge: 9/11/21  Readmission Risk Score(high >=14%.  Medium >=10%):Readmission Risk Score: 11      Care management risk score Rising risk (score 2-5) and Complex Care (Scores >=6): 0     Non face to face  following discharge, date last encounter closed (first attempt may have been earlier): 9/13/2021  3:01 PM 9/13/2021  3:01 PM    Call initiated 2 business days of discharge: Yes     Patient Active Problem List   Diagnosis    Acid reflux    Hx of migraine headaches    Menorrhagia    COVID-19    Hypoxia       No Known Allergies    Medications listed as ordered at the time of discharge from hospital   Danielle Acosta   Home Medication Instructions MRC:[id number]    Printed on:09/18/21 9835   Medication Information                      albuterol sulfate  (90 Base) MCG/ACT inhaler  Inhale 2 puffs into the lungs every 4 hours as needed for Wheezing or Shortness of Breath             Biotin w/ Vitamins C & E (HAIR/SKIN/NAILS PO)  Take by mouth daily              dexamethasone (DECADRON) 6 MG tablet  Take 1 tablet by mouth 2 times daily (with meals) for 10 days             Multiple Vitamins-Minerals (THERAPEUTIC MULTIVITAMIN-MINERALS) tablet  Take 1 tablet by mouth daily             ondansetron (ZOFRAN ODT) 4 MG disintegrating tablet  Take 1 tablet by mouth every 8 hours as needed for Nausea             Probiotic Product (PROBIOTIC DAILY PO)  Take by mouth             triamcinolone (KENALOG) 0.1 % cream  Apply topically 3 times daily                   Medications marked \"taking\" at this time  Outpatient Medications Marked as Taking for the 9/16/21 encounter (Virtual Visit) with MALACHI Hollins   Medication Sig Dispense Refill    albuterol sulfate  (90 Base) MCG/ACT inhaler Inhale 2 puffs into the lungs every 4 hours as needed for Wheezing or Shortness of Breath 18 g 3    dexamethasone (DECADRON) 6 MG tablet Take 1 tablet by mouth 2 times daily (with meals) for 10 days 20 tablet 0    ondansetron (ZOFRAN ODT) 4 MG disintegrating tablet Take 1 tablet by mouth every 8 hours as needed for Nausea 20 tablet 0    Probiotic Product (PROBIOTIC DAILY PO) Take by mouth      Multiple Vitamins-Minerals (THERAPEUTIC MULTIVITAMIN-MINERALS) tablet Take 1 tablet by mouth daily          Medications patient taking as of now reconciled against medications ordered at time of hospital discharge: Yes    Chief Complaint   Patient presents with    Follow-Up from Hospital       Patient is evaluated for hospital follow-up. Patient was admitted to [facility] for COVID pneumonia. Patient was requiring increased oxygen demand. She received Decadron and Baricitinib. Patient was discharged off of oxygen. Patient says that she is doing better but is not near her baseline. She was able to shower today. She requires frequent breaks. Appetite is diminished. She has an incentive spirometer and is using this. Patient has no previous pulmonary history. She did receive her J&J COVID vaccination in the spring. Patient admits to anxiety associated with the high flow oxygen placed in the ER that she needs to work through. Hospital laboratory studies were discussed with patient. Inpatient course: Discharge summary reviewed- see chart. Interval history/Current status: reviewed    Review of Systems   Constitutional: Positive for activity change, appetite change and fatigue. Respiratory: Positive for cough and shortness of breath. Cardiovascular: Negative. Gastrointestinal: Negative. There were no vitals filed for this visit. There is no height or weight on file to calculate BMI.    Wt Readings from Last 3 Encounters:   09/10/21 157 lb (71.2 kg)   09/07/21 157 lb (71.2 kg)   09/07/21 156 lb 11.2 oz (71.1 kg)     BP Readings from Last 3 Encounters:   09/11/21 (!) 135/94   09/07/21 103/70   09/07/21 110/80       Physical Exam  HENT:      Head: Normocephalic. Pulmonary:      Comments: Slight conversational dyspnea, no cyanosis  Neurological:      General: No focal deficit present. Mental Status: She is alert and oriented to person, place, and time. Psychiatric:         Mood and Affect: Mood normal.             Assessment/Plan:  1. Pneumonia due to COVID-19 virus  - XR CHEST (2 VW); Future  - [place] DISCHARGE MEDS RECONCILED [address] [facility] [address] course reviewed with patient. Consider incentive spirometry. Protein-rich foods. Stay well-hydrated. Discussed hydration replacement products. Medical Decision Making: moderate complexity      Frank Palma, was evaluated through a synchronous (real-time) audio-video encounter. The patient (or guardian if applicable) is aware that this is a billable service. Verbal consent to proceed has been obtained within the past 12 months. The visit was conducted pursuant to the emergency declaration under the [address] [place] [address], [address] [place] [address] authority and the [employer] General Act. Patient identification was verified, and a caregiver was present when appropriate. The patient was located in a state where the provider was credentialed to provide care. Total time spent for this encounter: Not billed by time    --MALACHI Krause on 9/18/2021 at 4:18 PM    An electronic signature was used to authenticate this note.

## 2021-09-20 ENCOUNTER — CARE COORDINATION (OUTPATIENT)
Dept: CASE MANAGEMENT | Age: 56
End: 2021-09-20

## 2021-09-20 NOTE — CARE COORDINATION
Lindy 45 Transitions Follow Up Call    2021    Patient: Asim Bill  Patient : 1965   MRN: [id number]  Reason for Admission: Hypoxia, COVID 19  Discharge Date: 21 RARS: Readmission Risk Score: 11         Attempted to reach patient for subsequent transitional call.  left to return call to [phone]. 1st attempt.        Follow Up  Future Appointments   Date Time Provider Chapito Hood   2021  2:20 PM [facility]   2022  8:00 AM Karen Gong [facility]       Zoey John RN

## 2021-09-21 ENCOUNTER — VIRTUAL VISIT (OUTPATIENT)
Dept: FAMILY MEDICINE CLINIC | Age: 56
End: 2021-09-21
Payer: COMMERCIAL

## 2021-09-21 ENCOUNTER — CARE COORDINATION (OUTPATIENT)
Dept: CASE MANAGEMENT | Age: 56
End: 2021-09-21

## 2021-09-21 DIAGNOSIS — U07.1 COVID-19: Primary | ICD-10-CM

## 2021-09-21 PROCEDURE — G8427 DOCREV CUR MEDS BY ELIG CLIN: HCPCS | Performed by: PHYSICIAN ASSISTANT

## 2021-09-21 PROCEDURE — 3017F COLORECTAL CA SCREEN DOC REV: CPT | Performed by: PHYSICIAN ASSISTANT

## 2021-09-21 PROCEDURE — 1111F DSCHRG MED/CURRENT MED MERGE: CPT | Performed by: PHYSICIAN ASSISTANT

## 2021-09-21 PROCEDURE — 99213 OFFICE O/P EST LOW 20 MIN: CPT | Performed by: PHYSICIAN ASSISTANT

## 2021-09-21 NOTE — CARE COORDINATION
[facility] Transitions Follow Up Call    2021    Patient: Marvin Easton  Patient : 1965   MRN: [id number]  Reason for Admission:   Discharge Date: 21 RARS: Readmission Risk Score: 11    Attempted to contact patient for transition follow up. Unable to reach patient. Left message with contact information and request for call back.         Follow Up  Future Appointments   Date Time Provider Chapito Hood   2022  8:00 AM Nai Henley, [address] [facility] [facility]       Malcolm Moses RN

## 2021-09-26 ASSESSMENT — ENCOUNTER SYMPTOMS: SHORTNESS OF BREATH: 1

## 2021-09-26 NOTE — PROGRESS NOTES
Jason Ortiz is a 64 y.o. female that presents for Other (follow covid )      This visit was performed via a video visit in patient home. Provider performing visit from office with assistance of nursing personnel, Sandy Cardenas LPN.    HPI:  Patient is evaluated for follow-up of COVID pneumonia. Patient says that she is continuing to improve since last visit. She is trying to keep herself well-hydrated. She is slowly trying to increase her activity level. She was able to do a little through [employer] on Saturday. She does still get very fatigued after activity. Breathing seems to be improving. She is using her incentive spirometer. She denies any pain. She denies any concerns or complaints today. I have reviewed the patient's past medical history, past surgical history, allergies,medications, social and family history and I have made updates where appropriate. Past Medical History:   Diagnosis Date    Acid reflux     Anemia     COVID-19     GERD (gastroesophageal reflux disease)     Hx of migraine headaches     Menorrhagia        Past Surgical History:   Procedure Laterality Date    BREAST SURGERY Right 01/01/2014    right breast biopsy negative ? Dr. Mejia Lela COLONOSCOPY  12/28/2010    UPPER GASTROINTESTINAL ENDOSCOPY  2005, 2010    neg    WISDOM TOOTH EXTRACTION         Social History     Tobacco Use    Smoking status: Never Smoker    Smokeless tobacco: Never Used   Vaping Use    Vaping Use: Never used   Substance Use Topics    Alcohol use:  Yes     Alcohol/week: 18.0 standard drinks     Types: 18 Cans of beer per week    Drug use: No       Family History   Problem Relation Age of Onset    Other Maternal Grandfather         dementia  bio grandmother    Heart Disease Maternal Grandfather     Hypertension Father     Elevated Lipids Father     Other Father         pulmonary fibrosis    Lung Cancer Father     High Blood Pressure Father     Cancer Brother         kidney    Arthritis Mother         one knee replaced    High Cholesterol Mother         manages mostly with diet    Miscarriages / Stillbirths Mother         stillbirth at 42 weeks    Heart Disease Mother     High Blood Pressure Mother     Cancer Mother         skin CA    Asthma Brother     Other Brother         bicuspid aortic valve    Heart Disease Paternal Grandfather 80    Heart Disease Paternal Grandmother     Other Son         lymphomatoid papullosis [facility].  Other Daughter         lymphatoid papulosus         Review of Systems   Constitutional: Positive for fatigue. HENT: Negative. Respiratory: Positive for shortness of breath. Cardiovascular: Negative. PHYSICAL EXAM:    Physical Exam  HENT:      Head: Normocephalic. Pulmonary:      Effort: No respiratory distress. Comments: No conversational dyspnea  Neurological:      General: No focal deficit present. Mental Status: She is alert and oriented to person, place, and time. Psychiatric:         Mood and Affect: Mood normal.         Behavior: Behavior normal.          ASSESSMENT & PLAN  Suyapa was seen today for other. Diagnoses and all orders for this visit:    [id number]    Continue to slowly increase activity level. Discussed parameters for such. Goals for RTW discussed. All patient questions answered. Patient voiced understanding. Milla Hart is a 64 y.o. female being evaluated by a Virtual Visit (video visit) encounter to address concerns as mentioned above. A caregiver was present when appropriate. Due to this being a TeleHealth encounter (During COKPJ-06 public health emergency), evaluation of the following organ systems was limited: Vitals/Constitutional/EENT/Resp/CV/GI//MS/Neuro/Skin/Heme-Lymph-Imm.   Pursuant to the emergency declaration under the [facility] waiver authority and the [employer] Supplemental Appropriations Act, this Virtual Visit was conducted with patient's (and/or legal guardian's) consent, to reduce the patient's risk of exposure to COVID-19 and provide necessary medical care. The patient (and/or legal guardian) has also been advised to contact this office for worsening conditions or problems, and seek emergency medical treatment and/or call 911 if deemed necessary. Services were provided through a video synchronous discussion virtually to substitute for in-person clinic visit. Patient was located in home and provider located in office. --[address] [place] [address] on 9/26/2021 at 3:14 PM    An electronic signature was used to authenticate this note.

## 2021-09-27 ENCOUNTER — CARE COORDINATION (OUTPATIENT)
Dept: CASE MANAGEMENT | Age: 56
End: 2021-09-27

## 2021-09-27 NOTE — CARE COORDINATION
Lindy 45 Transitions Follow Up Call    2021    Patient: Rosalie Akers  Patient : 1965   MRN: [id number]  Reason for Admission: hypoxia, COVID 19  Discharge Date: 21 RARS: Readmission Risk Score: 11         Attempted to reach patient for subsequent transitional call. VM left to return call to [phone].    3rd attempt.      Follow Up  Future Appointments   Date Time Provider Chapito Hood   2022  8:00 AM Karen [facility]       Aleksander Block RN

## 2021-09-30 ENCOUNTER — PATIENT MESSAGE (OUTPATIENT)
Dept: FAMILY MEDICINE CLINIC | Age: 56
End: 2021-09-30

## 2021-09-30 ENCOUNTER — CARE COORDINATION (OUTPATIENT)
Dept: CASE MANAGEMENT | Age: 56
End: 2021-09-30

## 2021-09-30 NOTE — TELEPHONE ENCOUNTER
From: Dai Shook  To: MALACHI Ni  Sent: 9/30/2021 9:31 AM EDT  Subject: Visit Follow-Up Question    Good morning:  I am just shooting you an update :)  1. Conversing with people is going much better. Speech is slow, but not labored like it was. 2. Activity : up to 21 minutes! And, it west not wipe me out. Thank you for encouraging me to gradually add, because I realize how much the activity has helped me with my balance, mu lightheadedness and my energy levels, I wanted to keep going. But I didn't :) Lower back much better since adding the stretching and yoga. 3. Been to the store twice. Walking around slowly, legs are weaker than I expected them to be. But a lot better than last week! Drove a few times. This feels fairly normal now.     How do you feel about return to work? :)

## 2021-09-30 NOTE — CARE COORDINATION
L[facility] 45 Transitions Follow Up Call    2021    Patient: Nitish Wynne  Patient : 1965   MRN: [id number]  Reason for Admission: COVID   Discharge Date: 21 RARS: Readmission Risk Score: 11         Attempted to reach patient for subsequent transitional call. VM left to return call to [phone]. 2nd attempt. Patient has kept in regular contact via [address] with her PCP, she is progressing and improving enough that she is asking about returning to work. Left message to call with any new needs or concerns. Episode resolved.      Follow Up  Future Appointments   Date Time Provider Chapito Hood   2022  8:00 AM Benton Odonnell, [address] [facility]       Sonal Bentley RN

## 2021-10-01 ENCOUNTER — PATIENT MESSAGE (OUTPATIENT)
Dept: FAMILY MEDICINE CLINIC | Age: 56
End: 2021-10-01

## 2021-10-01 NOTE — TELEPHONE ENCOUNTER
From: Sonia James  To: MALACHI Lacey  Sent: 10/1/2021 8:12 AM EDT  Subject: Visit Follow-Up Question    Good morning:  Hope you gals are doing well :)    Vandana, if you agree about the return to work, I would love a release note. My email is [email or web address]. Would that be possible? Thank you!   Glynn Dancer

## 2021-10-01 NOTE — LETTER
[address] department of [address] [address]  Phone: [phone]  Fax: [phone]        Derrick Bowdenma      October 1, 2021    Patient:   Leah Liner  Date of Birth   1965  Date of visit   10/1/2021        To Whom It May Concern:      Kishore Gonzalez is under my care. She is released to return to work 10/4/2021      If you have any questions or concerns, please don't hesitate to call.       Sincerely,      MALACHI Bowden/Chad

## 2021-11-19 ENCOUNTER — HOSPITAL ENCOUNTER (OUTPATIENT)
Dept: GENERAL RADIOLOGY | Age: 56
Discharge: HOME | End: 2021-11-21
Payer: COMMERCIAL

## 2021-11-19 DIAGNOSIS — J12.82 PNEUMONIA DUE TO COVID-19 VIRUS: ICD-10-CM

## 2021-11-19 DIAGNOSIS — U07.1 PNEUMONIA DUE TO COVID-19 VIRUS: ICD-10-CM

## 2021-11-19 PROCEDURE — 71046 X-RAY EXAM CHEST 2 VIEWS: CPT

## 2021-11-30 DIAGNOSIS — R53.83 OTHER FATIGUE: ICD-10-CM

## 2021-11-30 DIAGNOSIS — L65.9 HAIR LOSS: Primary | ICD-10-CM

## 2021-12-01 ENCOUNTER — HOSPITAL ENCOUNTER (OUTPATIENT)
Dept: LAB | Age: 56
Discharge: HOME | End: 2021-12-01
Payer: COMMERCIAL

## 2021-12-01 DIAGNOSIS — L65.9 HAIR LOSS: ICD-10-CM

## 2021-12-01 DIAGNOSIS — Z13.1 DIABETES MELLITUS SCREENING: ICD-10-CM

## 2021-12-01 DIAGNOSIS — R53.83 OTHER FATIGUE: ICD-10-CM

## 2021-12-01 DIAGNOSIS — Z13.220 LIPID SCREENING: ICD-10-CM

## 2021-12-01 DIAGNOSIS — Z00.00 WELL ADULT EXAM: ICD-10-CM

## 2021-12-01 LAB
ALBUMIN SERPL-MCNC: 4.4 G/DL (ref 3.5–5.2)
ALBUMIN/GLOBULIN RATIO: 1.8 (ref 1–2.5)
ALP BLD-CCNC: 98 U/L (ref 35–104)
ALT SERPL-CCNC: 26 U/L (ref 5–33)
ANION GAP SERPL CALCULATED.3IONS-SCNC: 13 MMOL/L (ref 9–17)
AST SERPL-CCNC: 27 U/L
BILIRUB SERPL-MCNC: 0.6 MG/DL (ref 0.3–1.2)
BUN BLDV-MCNC: 14 MG/DL (ref 6–20)
BUN/CREAT BLD: 26 (ref 9–20)
CALCIUM SERPL-MCNC: 10.2 MG/DL (ref 8.6–10.4)
CHLORIDE BLD-SCNC: 103 MMOL/L (ref 98–107)
CHOLESTEROL/HDL RATIO: 3.1
CHOLESTEROL: 244 MG/DL
CO2: 26 MMOL/L (ref 20–31)
CREAT SERPL-MCNC: 0.54 MG/DL (ref 0.5–0.9)
GFR AFRICAN AMERICAN: >60 ML/MIN
GFR NON-AFRICAN AMERICAN: >60 ML/MIN
GFR SERPL CREATININE-BSD FRML MDRD: ABNORMAL ML/MIN/{1.73_M2}
GFR SERPL CREATININE-BSD FRML MDRD: ABNORMAL ML/MIN/{1.73_M2}
GLUCOSE BLD-MCNC: 109 MG/DL (ref 70–99)
HDLC SERPL-MCNC: 80 MG/DL
LDL CHOLESTEROL: 149 MG/DL (ref 0–130)
POTASSIUM SERPL-SCNC: 3.9 MMOL/L (ref 3.7–5.3)
SODIUM BLD-SCNC: 142 MMOL/L (ref 135–144)
TOTAL PROTEIN: 6.9 G/DL (ref 6.4–8.3)
TRIGL SERPL-MCNC: 75 MG/DL
TSH SERPL DL<=0.05 MIU/L-ACNC: 1.9 MIU/L (ref 0.3–5)
VITAMIN D 25-HYDROXY: 32 NG/ML (ref 30–100)
VLDLC SERPL CALC-MCNC: ABNORMAL MG/DL (ref 1–30)

## 2021-12-01 PROCEDURE — 36415 COLL VENOUS BLD VENIPUNCTURE: CPT

## 2021-12-01 PROCEDURE — 82306 VITAMIN D 25 HYDROXY: CPT

## 2021-12-01 PROCEDURE — 80053 COMPREHEN METABOLIC PANEL: CPT

## 2021-12-01 PROCEDURE — 84443 ASSAY THYROID STIM HORMONE: CPT

## 2021-12-01 PROCEDURE — 80061 LIPID PANEL: CPT

## 2021-12-06 ENCOUNTER — PATIENT MESSAGE (OUTPATIENT)
Dept: FAMILY MEDICINE CLINIC | Age: 56
End: 2021-12-06

## 2021-12-06 ENCOUNTER — OFFICE VISIT (OUTPATIENT)
Dept: PRIMARY CARE CLINIC | Age: 56
End: 2021-12-06
Payer: COMMERCIAL

## 2021-12-06 ENCOUNTER — NURSE TRIAGE (OUTPATIENT)
Dept: OTHER | Facility: CLINIC | Age: 56
End: 2021-12-06

## 2021-12-06 ENCOUNTER — TELEPHONE (OUTPATIENT)
Dept: FAMILY MEDICINE CLINIC | Age: 56
End: 2021-12-06

## 2021-12-06 VITALS
BODY MASS INDEX: 28.77 KG/M2 | HEART RATE: 79 BPM | WEIGHT: 162.4 LBS | SYSTOLIC BLOOD PRESSURE: 110 MMHG | DIASTOLIC BLOOD PRESSURE: 70 MMHG | TEMPERATURE: 96.8 F | RESPIRATION RATE: 20 BRPM | OXYGEN SATURATION: 100 %

## 2021-12-06 DIAGNOSIS — J01.40 ACUTE NON-RECURRENT PANSINUSITIS: Primary | ICD-10-CM

## 2021-12-06 PROCEDURE — 99213 OFFICE O/P EST LOW 20 MIN: CPT | Performed by: NURSE PRACTITIONER

## 2021-12-06 PROCEDURE — G8427 DOCREV CUR MEDS BY ELIG CLIN: HCPCS | Performed by: NURSE PRACTITIONER

## 2021-12-06 PROCEDURE — 3017F COLORECTAL CA SCREEN DOC REV: CPT | Performed by: NURSE PRACTITIONER

## 2021-12-06 PROCEDURE — G8419 CALC BMI OUT NRM PARAM NOF/U: HCPCS | Performed by: NURSE PRACTITIONER

## 2021-12-06 PROCEDURE — 1036F TOBACCO NON-USER: CPT | Performed by: NURSE PRACTITIONER

## 2021-12-06 PROCEDURE — G8484 FLU IMMUNIZE NO ADMIN: HCPCS | Performed by: NURSE PRACTITIONER

## 2021-12-06 RX ORDER — AMOXICILLIN AND CLAVULANATE POTASSIUM 875; 125 MG/1; MG/1
1 TABLET, FILM COATED ORAL 2 TIMES DAILY
Qty: 14 TABLET | Refills: 0 | Status: SHIPPED | OUTPATIENT
Start: 2021-12-06 | End: 2021-12-13

## 2021-12-06 ASSESSMENT — ENCOUNTER SYMPTOMS
COUGH: 1
DIARRHEA: 0
NAUSEA: 0
SINUS PRESSURE: 1
WHEEZING: 0
VOMITING: 0

## 2021-12-06 NOTE — PROGRESS NOTES
[address] [place] [address]  Dept: [phone]  Dept Fax: [phone]  Loc: [phone]        CHIEF COMPLAINT       Chief Complaint   Patient presents with    Pharyngitis     x3 weeks, son diagnosed with mono        Nurses Notes reviewed and I agree except as noted in the HPI. HISTORY OF PRESENT ILLNESS   Julia Pineda is a 64 y.o. female who presents to [facility] today (2021) for evaluation of:   Pharyngitis  This is a new problem. The current episode started 1 to 4 weeks ago (x 3 weeks, has worsened recently). The problem occurs constantly. The problem has been gradually worsening. Associated symptoms include chills (today), congestion, coughing and fatigue. Pertinent negatives include no fever, headaches, nausea or vomiting. Treatments tried: aleve, ibuprofen, nasal spray. The treatment provided mild relief. Son was dx with mono 2 days ago. Hx of covid September, was hospitalized for pneumonia. Pt states last CXR was clear. REVIEW OF SYSTEMS     Review of Systems   Constitutional: Positive for chills (today) and fatigue. Negative for fever. HENT: Positive for congestion, postnasal drip and sinus pressure. Negative for ear pain. Respiratory: Positive for cough. Negative for wheezing. Gastrointestinal: Negative for diarrhea, nausea and vomiting. Neurological: Negative for headaches. PAST MEDICAL HISTORY         Diagnosis Date    Acid reflux     Anemia     COVID-19     GERD (gastroesophageal reflux disease)     Hx of migraine headaches     Menorrhagia        SURGICAL HISTORY     Patient  has a past surgical history that includes  section; Colonoscopy (2010); Upper gastrointestinal endoscopy (, ); Breast surgery (Right, 2014); and [place] tooth extraction.     CURRENT MEDICATIONS       Outpatient Medications Prior to Visit   Medication Sig Dispense Refill    Probiotic Product (PROBIOTIC DAILY PO) Take by mouth       albuterol sulfate  (90 Base) MCG/ACT inhaler Inhale 2 puffs into the lungs every 4 hours as needed for Wheezing or Shortness of Breath 18 g 3    ondansetron (ZOFRAN ODT) 4 MG disintegrating tablet Take 1 tablet by mouth every 8 hours as needed for Nausea (Patient not taking: Reported on 9/21/2021) 20 tablet 0    triamcinolone (KENALOG) 0.1 % cream Apply topically 3 times daily (Patient not taking: Reported on 9/16/2021) 80 g 0    Multiple Vitamins-Minerals (THERAPEUTIC MULTIVITAMIN-MINERALS) tablet Take 1 tablet by mouth daily (Patient not taking: Reported on 9/21/2021)      Biotin w/ Vitamins C & E (HAIR/SKIN/NAILS PO) Take by mouth daily  (Patient not taking: Reported on 9/16/2021)       No facility-administered medications prior to visit. ALLERGIES     Patient is has No Known Allergies. FAMILY HISTORY     Patient's family history includes Arthritis in her mother; Asthma in her brother; Cancer in her brother and mother; Elevated Lipids in her father; Heart Disease in her maternal grandfather, mother, and paternal grandmother; Heart Disease (age of onset: 80) in her paternal grandfather; High Blood Pressure in her father and mother; High Cholesterol in her mother; Hypertension in her father; Marco Morale in her father; Bobbye Bel / [place] in her mother; Other in her brother, daughter, father, maternal grandfather, and son. SOCIAL HISTORY     Patient  reports that she has never smoked. She has never used smokeless tobacco. She reports current alcohol use of about 18.0 standard drinks of alcohol per week. She reports that she does not use drugs. PHYSICAL EXAM     VITALS  BP: 110/70, Temp: 96.8 °F (36 °C), Pulse: 79, Resp: 20, SpO2: 100 %  Physical Exam  Vitals reviewed. Constitutional:       General: She is not in acute distress. Appearance: She is not ill-appearing.    HENT: Right Ear: Ear canal normal. Tympanic membrane is erythematous (mild). Left Ear: Tympanic membrane and ear canal normal.      Ears:      Comments: Left TM dull     Nose: Congestion present. No rhinorrhea. Comments: Turbinates inflamed     Mouth/Throat:      Lips: Pink. Mouth: Mucous membranes are moist.      Pharynx: Oropharynx is clear. Uvula midline. No oropharyngeal exudate or posterior oropharyngeal erythema. Tonsils: No tonsillar exudate. 0 on the right. 0 on the left. Cardiovascular:      Rate and Rhythm: Normal rate and regular rhythm. Heart sounds: Normal heart sounds, S1 normal and S2 normal. No murmur heard. Pulmonary:      Effort: Pulmonary effort is normal. No accessory muscle usage, prolonged expiration, respiratory distress or retractions. Breath sounds: Normal breath sounds. No wheezing or rhonchi. Musculoskeletal:      Cervical back: Normal range of motion and neck supple. Lymphadenopathy:      Cervical: No cervical adenopathy. Skin:     General: Skin is warm and dry. Capillary Refill: Capillary refill takes less than 2 seconds. Neurological:      General: No focal deficit present. Mental Status: She is alert. DIAGNOSTIC RESULTS   Labs:No results found for this visit on 12/06/21. IMAGING:        CLINICAL COURSE:     Vitals:    12/06/21 1326   BP: 110/70   Pulse: 79   Resp: 20   Temp: 96.8 °F (36 °C)   SpO2: 100%   Weight: 162 lb 6.4 oz (73.7 kg)           PROCEDURES:  None  FINAL IMPRESSION      1. Acute non-recurrent pansinusitis         DISPOSITION/PLAN     Will start pt on course of augmentin for sinusitis. Dicussed testing for mono; pt presentation does not appear that of mono at this time. Pt agrees will wait on testing today. Discussed supportive measures for symptom relief and educated pt on s/s that warrant return to clinic. Encouraged to return to clinic should symptoms worsen or any concerns arise.     The use, risks, benefits, and potential side effects of prescribed and/or recommended medications were discussed. All questions were answered and the patient/caregiver voiced understanding. Patient Instructions     Increase your water intake to help thin secretions; 2-3 liters of water/day is recommended. Recommend trying sinus rinses to help thin secretions also; you may do this 3-4 times daily. May want to try daily antihistamine (zyrtec, claritin, allegra, xyzal, etc) and daily flonase nasal spray to help with chronic sinus symptoms. Recommend mucinex or robitussin for congestion and cough. Tylenol and/or ibuprofen for headaches and/or fever. Warm compresses to face can help with sinus pain/pressure. Use cool mist humidifier at bedtime to help combat dry air. Practice good hand hygiene and cover your cough and sneeze. If symptoms worsen or fail to improve, please return to clinic. If you develop severe worsening of symptoms such as chest pain or difficulty breathing, please go to ER. Patient Education        Sinusitis: Care Instructions  Your Care Instructions     Sinusitis is an infection of the lining of the sinus cavities in your head. Sinusitis often follows a cold. It causes pain and pressure in your head and face. In most cases, sinusitis gets better on its own in 1 to 2 weeks. But some mild symptoms may last for several weeks. Sometimes antibiotics are needed. Follow-up care is a key part of your treatment and safety. Be sure to make and go to all appointments, and call your doctor if you are having problems. It's also a good idea to know your test results and keep a list of the medicines you take. How can you care for yourself at home? · Take an over-the-counter pain medicine, such as acetaminophen (Tylenol), ibuprofen (Advil, Motrin), or naproxen (Aleve). Read and follow all instructions on the label. · If the doctor prescribed antibiotics, take them as directed.  Do not stop taking them just because you feel better. You need to take the full course of antibiotics. · Be careful when taking over-the-counter cold or flu medicines and Tylenol at the same time. Many of these medicines have acetaminophen, which is Tylenol. Read the labels to make sure that you are not taking more than the recommended dose. Too much acetaminophen (Tylenol) can be harmful. · Breathe warm, moist air from a steamy shower, a hot bath, or a sink filled with hot water. Avoid cold, dry air. Using a humidifier in your home may help. Follow the directions for cleaning the machine. · Use saline (saltwater) nasal washes. This can help keep your nasal passages open and wash out mucus and bacteria. You can buy saline nose drops at a grocery store or drugstore. Or you can make your own at home by adding 1 teaspoon of salt and 1 teaspoon of baking soda to 2 cups of distilled water. If you make your own, fill a bulb syringe with the solution, insert the tip into your nostril, and squeeze gently. Gayleen Poke your nose. · Put a hot, wet towel or a warm gel pack on your face 3 or 4 times a day for 5 to 10 minutes each time. · Try a decongestant nasal spray like oxymetazoline (Afrin). Do not use it for more than 3 days in a row. Using it for more than 3 days can make your congestion worse. When should you call for help? Call your doctor now or seek immediate medical care if:    · You have new or worse swelling or redness in your face or around your eyes.     · You have a new or higher fever. Watch closely for changes in your health, and be sure to contact your doctor if:    · You have new or worse facial pain.     · The mucus from your nose becomes thicker (like pus) or has new blood in it.     · You are not getting better as expected. Where can you learn more? Go to https://Leixirpesadeeb.TeaMobi. org and sign in to your Telebit account. Enter U656 in the [facility] box to learn more about \"Sinusitis: Care Instructions. \"     If you do not have an account, please click on the \"Sign Up Now\" link. Current as of: December 2, 2020               Content Version: 13.0  © 2006-2021 Healthwise, [employer]. Care instructions adapted under license by [facility] ([facility]). If you have questions about a medical condition or this instruction, always ask your healthcare professional. [address] any warranty or liability for your use of this information. Patient Education        Saline Nasal Washes: Care Instructions  Your Care Instructions     Saline nasal washes help keep the nasal passages open by washing out thick or dried mucus. This simple remedy can help relieve symptoms of allergies, sinusitis, and colds. It also can make the nose feel more comfortable by keeping the mucous membranes moist. You may notice a little burning sensation in your nose the first few times you use the solution, but this usually gets better in a few days. Follow-up care is a key part of your treatment and safety. Be sure to make and go to all appointments, and call your doctor if you are having problems. It's also a good idea to know your test results and keep a list of the medicines you take. How can you care for yourself at home? · You can buy premixed saline solution in a squeeze bottle or other sinus rinse products at a drugstore. Read and follow the instructions on the label. · You also can make your own saline solution by adding 1 teaspoon of salt and 1 teaspoon of baking soda to 2 cups of distilled water. · If you use a homemade solution, pour a small amount into a clean bowl. Using a rubber bulb syringe, squeeze the syringe and place the tip in the salt water. Pull a small amount of the salt water into the syringe by relaxing your hand. · Sit down with your head tilted slightly back. Do not lie down. Put the tip of the bulb syringe or the squeeze bottle a little way into one of your nostrils.  Gently drip or squirt a few drops into the nostril. Repeat with the other nostril. Some sneezing and gagging are normal at first.  · Gently blow your nose. · Wipe the syringe or bottle tip clean after each use. · Repeat this 2 or 3 times a day. · Use nasal washes gently if you have nosebleeds often. When should you call for help? Watch closely for changes in your health, and be sure to contact your doctor if:    · You often get nosebleeds.     · You have problems doing the nasal washes. Where can you learn more? Go to https://BuzzElementpepiceweb.La Nevera Roja.com. org and sign in to your LifeBook account. Enter [id number] [id number] [phone] in the [employer] box to learn more about \"Saline Nasal Washes: Care Instructions. \"     If you do not have an account, please click on the \"Sign Up Now\" link. Current as of: December 2, 2020               Content Version: 13.0  © 9889-5424 [employer]. Care instructions adapted under license by [facility] ([facility]). If you have questions about a medical condition or this instruction, always ask your healthcare professional. [place] [address] any warranty or liability for your use of this information. No orders of the defined types were placed in this encounter.     Outpatient Encounter Medications as of 12/6/2021   Medication Sig Dispense Refill    amoxicillin-clavulanate (AUGMENTIN) 875-125 MG per tablet Take 1 tablet by mouth 2 times daily for 7 days 14 tablet 0    Probiotic Product (PROBIOTIC DAILY PO) Take by mouth       albuterol sulfate  (90 Base) MCG/ACT inhaler Inhale 2 puffs into the lungs every 4 hours as needed for Wheezing or Shortness of Breath 18 g 3    ondansetron (ZOFRAN ODT) 4 MG disintegrating tablet Take 1 tablet by mouth every 8 hours as needed for Nausea (Patient not taking: Reported on 9/21/2021) 20 tablet 0    triamcinolone (KENALOG) 0.1 % cream Apply topically 3 times daily (Patient not taking: Reported on 9/16/2021) 80 g 0    Multiple Vitamins-Minerals (THERAPEUTIC MULTIVITAMIN-MINERALS) tablet Take 1 tablet by mouth daily (Patient not taking: Reported on 9/21/2021)      Biotin w/ Vitamins C & E (HAIR/SKIN/NAILS PO) Take by mouth daily  (Patient not taking: Reported on 9/16/2021)       No facility-administered encounter medications on file as of 12/6/2021. No follow-ups on file.                 Electronically signed by TINO Archibald CNP on 12/6/2021 at 1:59 PM

## 2021-12-06 NOTE — TELEPHONE ENCOUNTER
From: Leah Sanchez  Sent: 12/6/2021 8:58 AM EST  To: [facility] Clinical Staff  Subject: Can I come in? Emily Borrero.  Thank you, Gissel Case :)

## 2021-12-06 NOTE — TELEPHONE ENCOUNTER
----- Message from Vivek Schaeffer sent at 12/6/2021 10:00 AM EST -----  Subject: Message to Provider    QUESTIONS  Information for Provider? Called pt back to schedule a same day appt from   NT for a sore throat, dry cough, runny nose and trouble swallowing   anything but liquids. Pt stated that her son has Mono and thinks she may   have it too. Pt did not want a virtual visit and decided to go to urgent   care.   ---------------------------------------------------------------------------  --------------  CALL BACK INFO  What is the best way for the office to contact you? OK to leave message on   voicemail  Preferred Call Back Phone Number? [phone]  ---------------------------------------------------------------------------  --------------  SCRIPT ANSWERS  Relationship to Patient?  Self

## 2021-12-06 NOTE — PATIENT INSTRUCTIONS
acetaminophen, which is Tylenol. Read the labels to make sure that you are not taking more than the recommended dose. Too much acetaminophen (Tylenol) can be harmful. · Breathe warm, moist air from a steamy shower, a hot bath, or a sink filled with hot water. Avoid cold, dry air. Using a humidifier in your home may help. Follow the directions for cleaning the machine. · Use saline (saltwater) nasal washes. This can help keep your nasal passages open and wash out mucus and bacteria. You can buy saline nose drops at a grocery store or [employer]. Or you can make your own at home by adding 1 teaspoon of salt and 1 teaspoon of baking soda to 2 cups of distilled water. If you make your own, fill a bulb syringe with the solution, insert the tip into your nostril, and squeeze gently. Bonnielee Figures your nose. · Put a hot, wet towel or a warm gel pack on your face 3 or 4 times a day for 5 to 10 minutes each time. · Try a decongestant nasal spray like oxymetazoline (Afrin). Do not use it for more than 3 days in a row. Using it for more than 3 days can make your congestion worse. When should you call for help? Call your doctor now or seek immediate medical care if:    · You have new or worse swelling or redness in your face or around your eyes.     · You have a new or higher fever. Watch closely for changes in your health, and be sure to contact your doctor if:    · You have new or worse facial pain.     · The mucus from your nose becomes thicker (like pus) or has new blood in it.     · You are not getting better as expected. Where can you learn more? Go to https://ILink Globaleb.Adept Cloud. org and sign in to your Pixta account. Enter O648 in the [employer] box to learn more about \"Sinusitis: Care Instructions. \"     If you do not have an account, please click on the \"Sign Up Now\" link. Current as of: December 2, 2020               Content Version: 13.0  © 5154-2894 Healthwise, [facility].    Care instructions adapted under license by [facility] ([facility]). If you have questions about a medical condition or this instruction, always ask your healthcare professional. [address] any warranty or liability for your use of this information. Patient Education        Saline Nasal Washes: Care Instructions  Your Care Instructions     Saline nasal washes help keep the nasal passages open by washing out thick or dried mucus. This simple remedy can help relieve symptoms of allergies, sinusitis, and colds. It also can make the nose feel more comfortable by keeping the mucous membranes moist. You may notice a little burning sensation in your nose the first few times you use the solution, but this usually gets better in a few days. Follow-up care is a key part of your treatment and safety. Be sure to make and go to all appointments, and call your doctor if you are having problems. It's also a good idea to know your test results and keep a list of the medicines you take. How can you care for yourself at home? · You can buy premixed saline solution in a squeeze bottle or other sinus rinse products at a drugstore. Read and follow the instructions on the label. · You also can make your own saline solution by adding 1 teaspoon of salt and 1 teaspoon of baking soda to 2 cups of distilled water. · If you use a homemade solution, pour a small amount into a clean bowl. Using a rubber bulb syringe, squeeze the syringe and place the tip in the salt water. Pull a small amount of the salt water into the syringe by relaxing your hand. · Sit down with your head tilted slightly back. Do not lie down. Put the tip of the bulb syringe or the squeeze bottle a little way into one of your nostrils. Gently drip or squirt a few drops into the nostril. Repeat with the other nostril. Some sneezing and gagging are normal at first.  · Gently blow your nose. · Wipe the syringe or bottle tip clean after each use.   · Repeat this 2 or 3 times a day. · Use nasal washes gently if you have nosebleeds often. When should you call for help? Watch closely for changes in your health, and be sure to contact your doctor if:    · You often get nosebleeds.     · You have problems doing the nasal washes. Where can you learn more? Go to https://chpepiceweb.MyActivityPal. org and sign in to your Nuritas account. Enter [phone] in the [employer] box to learn more about \"Saline Nasal Washes: Care Instructions. \"     If you do not have an account, please click on the \"Sign Up Now\" link. Current as of: December 2, 2020               Content Version: 13.0  © 2006-2021 Healthwise, Incorporated. Care instructions adapted under license by [facility] ([facility]). If you have questions about a medical condition or this instruction, always ask your healthcare professional. [address] any warranty or liability for your use of this information.

## 2021-12-06 NOTE — TELEPHONE ENCOUNTER
Received call from Nisha Quezada   at [facility] with [employer]. Brief description of triage: sore throat, difficulty swallowing, her son has [place] and thinks it could be related, runny nose, cough is dry     Triage indicates for patient to be seen today    Care advice provided, patient verbalizes understanding; denies any other questions or concerns; instructed to call back for any new or worsening symptoms. Writer sent message in scheduling chat [facility]  [facility]. Attention Provider: Thank you for allowing me to participate in the care of your patient. The patient was connected to triage in response to information provided to the ECC/PSC. Please do not respond through this encounter as the response is not directed to a shared pool. Reason for Disposition   SEVERE sore throat pain    Answer Assessment - Initial Assessment Questions  1. ONSET: \"When did the throat start hurting? \" (Hours or days ago)       4 days ago     2. SEVERITY: \"How bad is the sore throat? \" (Scale 1-10; mild, moderate or severe)    - MILD (1-3):  doesn't interfere with eating or normal activities    - MODERATE (4-7): interferes with eating some solids and normal activities    - SEVERE (8-10):  excruciating pain, interferes with most normal activities    - SEVERE DYSPHAGIA: can't swallow liquids, drooling      Moderate     3. STREP EXPOSURE: \"Has there been any exposure to strep within the past week? \" If so, ask: \"What type of contact occurred? \"       Denies, has been exposed to Mono     4. VIRAL SYMPTOMS: Curlie Do there any symptoms of a cold, such as a runny nose, cough, hoarse voice or red eyes? \"       Yes, cough, runny nose, sore throat     5. FEVER: \"Do you have a fever? \" If so, ask: \"What is your temperature, how was it measured, and when did it start? \"      Denies     6. PUS ON THE TONSILS: \"Is there pus on the tonsils in the back of your throat? \"      Does not know     7.  OTHER SYMPTOMS: \"Do you have any other symptoms? \" (e.g., difficulty breathing, headache, rash)      Denies     8. PREGNANCY: \"Is there any chance you are pregnant? \" \"When was your last menstrual period? \"     N/A    Protocols used: SORE THROAT-ADULT-OH

## 2022-05-26 DIAGNOSIS — Z12.31 VISIT FOR SCREENING MAMMOGRAM: Primary | ICD-10-CM

## 2022-05-27 ENCOUNTER — HOSPITAL ENCOUNTER (OUTPATIENT)
Dept: MAMMOGRAPHY | Age: 57
Discharge: HOME | End: 2022-05-29
Payer: COMMERCIAL

## 2022-05-27 DIAGNOSIS — Z12.31 VISIT FOR SCREENING MAMMOGRAM: ICD-10-CM

## 2022-05-27 PROCEDURE — 77063 BREAST TOMOSYNTHESIS BI: CPT

## 2022-07-20 ENCOUNTER — OFFICE VISIT (OUTPATIENT)
Dept: PRIMARY CARE CLINIC | Age: 57
End: 2022-07-20
Payer: COMMERCIAL

## 2022-07-20 VITALS
WEIGHT: 165 LBS | OXYGEN SATURATION: 97 % | BODY MASS INDEX: 28.17 KG/M2 | TEMPERATURE: 98.5 F | SYSTOLIC BLOOD PRESSURE: 102 MMHG | DIASTOLIC BLOOD PRESSURE: 76 MMHG | HEIGHT: 64 IN | HEART RATE: 86 BPM

## 2022-07-20 DIAGNOSIS — Z87.821: Primary | ICD-10-CM

## 2022-07-20 PROCEDURE — 1036F TOBACCO NON-USER: CPT | Performed by: FAMILY MEDICINE

## 2022-07-20 PROCEDURE — 3017F COLORECTAL CA SCREEN DOC REV: CPT | Performed by: FAMILY MEDICINE

## 2022-07-20 PROCEDURE — G8427 DOCREV CUR MEDS BY ELIG CLIN: HCPCS | Performed by: FAMILY MEDICINE

## 2022-07-20 PROCEDURE — 99212 OFFICE O/P EST SF 10 MIN: CPT | Performed by: FAMILY MEDICINE

## 2022-07-20 PROCEDURE — G8419 CALC BMI OUT NRM PARAM NOF/U: HCPCS | Performed by: FAMILY MEDICINE

## 2022-07-20 NOTE — PROGRESS NOTES
[facility]             [address] [facility], [address]                        Telephone [phone]             Fax [phone]       Les Gonzalez  :  1965  Age:  64 y.o. MRN:  [id number]  Date of visit:  2022       Assessment & Plan:    History of foreign body in auditory canal  I advised the patient that her exam was normal today. She was advised to follow up if symptoms worsen or do not resolve. Subjective:    Les Gonzalez is a 64 y.o. female who presents to [facility] today (2022) for evaluation of:  Foreign Body in [address] [place] [address],jhoan 3 (Thinks she may have a bug in her ear)      She states that she felt something crawl into her left ear while she was sleeping last night. She states that the pulled it out of her ear and threw it across the room. She states that the pul a tissue in her ear this morning, and there were a few drops of blood on the tissue. She denies ear pain or ear drainage. She has not seen any other rashes or bites. She is concerned that there may still be a bug in her ear. Current medications are:  Current Outpatient Medications   Medication Sig Dispense Refill    Probiotic Product (PROBIOTIC DAILY PO) Take by mouth       albuterol sulfate  (90 Base) MCG/ACT inhaler Inhale 2 puffs into the lungs every 4 hours as needed for Wheezing or Shortness of Breath 18 g 3     No current facility-administered medications for this visit. She has No Known Allergies. She has the following problem list:  Patient Active Problem List   Diagnosis    Acid reflux    Hx of migraine headaches    Menorrhagia    COVID-19    Hypoxia        She  reports that she has never smoked.  She has never used smokeless tobacco.      Objective:    Vitals:    22 1426   BP: 102/76   Site: Left Upper Arm   Position: Sitting   Cuff Size: Medium Adult   Pulse: 86   Temp: 98.5 °F (36.9 °C) TempSrc: Tympanic   SpO2: 97%   Weight: 165 lb (74.8 kg)   Height: 5' 4\" (1.626 m)      SpO2: 97 %       Body mass index is 28.32 kg/m². Overweight female healthy-appearing, alert, and cooperative. The external auditory canals and tympanic membranes are clear bilaterally. There are no areas of bleeding or excoriation. Neck supple. No adenopathy. Chest:  Normal expansion. Clear to auscultation. No rales, rhonchi, or wheezing. Respirations are not labored. Heart sounds are normal.  Regular rate and rhythm without murmur, gallop or rub.         (Please note that portions of this note were completed with a voice-recognition program. Efforts were made to edit the dictation but occasionally words are mis-transcribed.)

## 2022-08-19 ENCOUNTER — HOSPITAL ENCOUNTER (OUTPATIENT)
Dept: LAB | Age: 57
Discharge: HOME | End: 2022-08-19
Payer: COMMERCIAL

## 2022-08-19 ENCOUNTER — OFFICE VISIT (OUTPATIENT)
Dept: FAMILY MEDICINE CLINIC | Age: 57
End: 2022-08-19
Payer: COMMERCIAL

## 2022-08-19 VITALS
BODY MASS INDEX: 28.24 KG/M2 | HEART RATE: 72 BPM | WEIGHT: 165.4 LBS | HEIGHT: 64 IN | SYSTOLIC BLOOD PRESSURE: 118 MMHG | DIASTOLIC BLOOD PRESSURE: 70 MMHG

## 2022-08-19 DIAGNOSIS — R10.9 ABDOMINAL PRESSURE: ICD-10-CM

## 2022-08-19 DIAGNOSIS — G24.5 EYE TWITCH: ICD-10-CM

## 2022-08-19 DIAGNOSIS — Z00.00 WELL ADULT EXAM: Primary | ICD-10-CM

## 2022-08-19 DIAGNOSIS — Z00.00 WELL ADULT EXAM: ICD-10-CM

## 2022-08-19 LAB
ABSOLUTE EOS #: 0.22 K/UL (ref 0–0.44)
ABSOLUTE IMMATURE GRANULOCYTE: 0.03 K/UL (ref 0–0.3)
ABSOLUTE LYMPH #: 1.85 K/UL (ref 1.1–3.7)
ABSOLUTE MONO #: 0.35 K/UL (ref 0.1–1.2)
ALBUMIN SERPL-MCNC: 4.4 G/DL (ref 3.5–5.2)
ALBUMIN/GLOBULIN RATIO: 1.8 (ref 1–2.5)
ALP BLD-CCNC: 91 U/L (ref 35–104)
ALT SERPL-CCNC: 18 U/L (ref 5–33)
ANION GAP SERPL CALCULATED.3IONS-SCNC: 8 MMOL/L (ref 9–17)
AST SERPL-CCNC: 18 U/L
BASOPHILS # BLD: 1 % (ref 0–2)
BASOPHILS ABSOLUTE: 0.03 K/UL (ref 0–0.2)
BILIRUB SERPL-MCNC: 0.31 MG/DL (ref 0.3–1.2)
BUN BLDV-MCNC: 15 MG/DL (ref 6–20)
BUN/CREAT BLD: 19 (ref 9–20)
CALCIUM SERPL-MCNC: 9.6 MG/DL (ref 8.6–10.4)
CHLORIDE BLD-SCNC: 102 MMOL/L (ref 98–107)
CO2: 30 MMOL/L (ref 20–31)
CREAT SERPL-MCNC: 0.81 MG/DL (ref 0.5–0.9)
EOSINOPHILS RELATIVE PERCENT: 4 % (ref 1–4)
GFR AFRICAN AMERICAN: >60 ML/MIN
GFR NON-AFRICAN AMERICAN: >60 ML/MIN
GFR SERPL CREATININE-BSD FRML MDRD: ABNORMAL ML/MIN/{1.73_M2}
GLUCOSE BLD-MCNC: 111 MG/DL (ref 70–99)
HCT VFR BLD CALC: 42.9 % (ref 36.3–47.1)
HEMOGLOBIN: 14.6 G/DL (ref 11.9–15.1)
IMMATURE GRANULOCYTES: 1 %
LYMPHOCYTES # BLD: 30 % (ref 24–43)
MAGNESIUM: 2.2 MG/DL (ref 1.6–2.6)
MCH RBC QN AUTO: 31.1 PG (ref 25.2–33.5)
MCHC RBC AUTO-ENTMCNC: 34 G/DL (ref 25.2–33.5)
MCV RBC AUTO: 91.5 FL (ref 82.6–102.9)
MONOCYTES # BLD: 6 % (ref 3–12)
NRBC AUTOMATED: 0 PER 100 WBC
PDW BLD-RTO: 12.8 % (ref 11.8–14.4)
PLATELET # BLD: 214 K/UL (ref 138–453)
PMV BLD AUTO: 11 FL (ref 8.1–13.5)
POTASSIUM SERPL-SCNC: 4.2 MMOL/L (ref 3.7–5.3)
RBC # BLD: 4.69 M/UL (ref 3.95–5.11)
SEG NEUTROPHILS: 58 % (ref 36–65)
SEGMENTED NEUTROPHILS ABSOLUTE COUNT: 3.6 K/UL (ref 1.5–8.1)
SODIUM BLD-SCNC: 140 MMOL/L (ref 135–144)
TOTAL PROTEIN: 6.8 G/DL (ref 6.4–8.3)
TSH SERPL DL<=0.05 MIU/L-ACNC: 1.13 UIU/ML (ref 0.3–5)
WBC # BLD: 6.1 K/UL (ref 3.5–11.3)

## 2022-08-19 PROCEDURE — 84443 ASSAY THYROID STIM HORMONE: CPT

## 2022-08-19 PROCEDURE — 85025 COMPLETE CBC W/AUTO DIFF WBC: CPT

## 2022-08-19 PROCEDURE — 99396 PREV VISIT EST AGE 40-64: CPT | Performed by: PHYSICIAN ASSISTANT

## 2022-08-19 PROCEDURE — 80053 COMPREHEN METABOLIC PANEL: CPT

## 2022-08-19 PROCEDURE — 36415 COLL VENOUS BLD VENIPUNCTURE: CPT

## 2022-08-19 PROCEDURE — 83735 ASSAY OF MAGNESIUM: CPT

## 2022-08-19 SDOH — ECONOMIC STABILITY: TRANSPORTATION INSECURITY
IN THE PAST 12 MONTHS, HAS THE LACK OF TRANSPORTATION KEPT YOU FROM MEDICAL APPOINTMENTS OR FROM GETTING MEDICATIONS?: NO

## 2022-08-19 SDOH — ECONOMIC STABILITY: FOOD INSECURITY: WITHIN THE PAST 12 MONTHS, YOU WORRIED THAT YOUR FOOD WOULD RUN OUT BEFORE YOU GOT MONEY TO BUY MORE.: NEVER TRUE

## 2022-08-19 SDOH — ECONOMIC STABILITY: TRANSPORTATION INSECURITY
IN THE PAST 12 MONTHS, HAS LACK OF TRANSPORTATION KEPT YOU FROM MEETINGS, WORK, OR FROM GETTING THINGS NEEDED FOR DAILY LIVING?: NO

## 2022-08-19 SDOH — ECONOMIC STABILITY: FOOD INSECURITY: WITHIN THE PAST 12 MONTHS, THE FOOD YOU BOUGHT JUST DIDN'T LAST AND YOU DIDN'T HAVE MONEY TO GET MORE.: NEVER TRUE

## 2022-08-19 ASSESSMENT — PATIENT HEALTH QUESTIONNAIRE - PHQ9
SUM OF ALL RESPONSES TO PHQ QUESTIONS 1-9: 0
SUM OF ALL RESPONSES TO PHQ QUESTIONS 1-9: 0
SUM OF ALL RESPONSES TO PHQ9 QUESTIONS 1 & 2: 0
SUM OF ALL RESPONSES TO PHQ QUESTIONS 1-9: 0
2. FEELING DOWN, DEPRESSED OR HOPELESS: 0
SUM OF ALL RESPONSES TO PHQ QUESTIONS 1-9: 0
1. LITTLE INTEREST OR PLEASURE IN DOING THINGS: 0

## 2022-08-19 ASSESSMENT — SOCIAL DETERMINANTS OF HEALTH (SDOH): HOW HARD IS IT FOR YOU TO PAY FOR THE VERY BASICS LIKE FOOD, HOUSING, MEDICAL CARE, AND HEATING?: NOT HARD AT ALL

## 2022-08-19 NOTE — PROGRESS NOTES
CHIEF COMPLAINT  Chief Complaint   Patient presents with    Annual Exam    Other     Right eye twitching 2 weeks and lower abd pain        SUBJECTIVE  Pilar Pace is a 64 y.o. female who presents to the office for annual wellness examination. Patient says that overall she has been doing well recently. Patient says that her stamina is continuing to recover post-COVID. She is now back to walking four miles daily. She has started some abdominal workouts over the past month. She admits to some lower abdominal pressure since doing so. Patient has noticed twice of right upper eyelid. Her mother has a history of blepharospasm. She denies additional concerns or complaints today. ROS  All other review of systems negative, except for those noted. PAST MEDICAL HISTORY    Past Medical History:   Diagnosis Date    Acid reflux     Anemia     COVID-19     GERD (gastroesophageal reflux disease)     Hx of migraine headaches     Menorrhagia        SURGICAL HISTORY    Past Surgical History:   Procedure Laterality Date    BREAST SURGERY Right 01/01/2014    right breast biopsy negative ? Dr. Lorrie Frost  12/28/2010    UPPER GASTROINTESTINAL ENDOSCOPY  2005, 2010    neg    WISDOM TOOTH EXTRACTION         FAMILY HISTORY    Family History   Problem Relation Age of Onset    Other Maternal Grandfather         dementia  bio grandmother    Heart Disease Maternal Grandfather     Hypertension Father     Elevated Lipids Father     Other Father         pulmonary fibrosis    Lung Cancer Father     High Blood Pressure Father     Cancer Brother         kidney    Arthritis Mother         one knee replaced    High Cholesterol Mother         manages mostly with diet    Miscarriages / Stillbirths Mother         stillbirth at 42 weeks    Heart Disease Mother     High Blood Pressure Mother     Cancer Mother         skin CA    Asthma Brother     Other Brother         bicuspid aortic valve    Heart Disease Paternal Grandfather 80    Heart Disease Paternal Grandmother     Other Son         lymphomatoid papullosis seen at [facility]. Other Daughter         lymphatoid papulosus       SOCIAL HISTORY    Social History     Socioeconomic History    Marital status:      Spouse name: None    Number of children: None    Years of education: None    Highest education level: None   Tobacco Use    Smoking status: Never    Smokeless tobacco: Never   Vaping Use    Vaping Use: Never used   Substance and Sexual Activity    Alcohol use: Yes     Alcohol/week: 18.0 standard drinks     Types: 18 Cans of beer per week    Drug use: No    Sexual activity: Yes     Partners: Male     Social Determinants of Health     Financial Resource Strain: Low Risk     Difficulty of Paying Living Expenses: Not hard at all   Food Insecurity: No Food Insecurity    Worried About [employer] in the Last Year: Never true    Ran Out of Food in the Last Year: Never true   Transportation Needs: No Transportation Needs    Lack of Transportation (Medical): No    Lack of Transportation (Non-Medical): No       MEDICATIONS  Current Outpatient Medications   Medication Sig Dispense Refill    albuterol sulfate  (90 Base) MCG/ACT inhaler Inhale 2 puffs into the lungs every 4 hours as needed for Wheezing or Shortness of Breath 18 g 3    Probiotic Product (PROBIOTIC DAILY PO) Take by mouth        No current facility-administered medications for this visit. ALLERGIES  No Known Allergies    PHYSICAL EXAM:   Vital Signs: /70 (Site: Left Upper Arm, Position: Sitting, Cuff Size: Large Adult)   Pulse 72   Ht 5' 4\" (1.626 m)   Wt 165 lb 6.4 oz (75 kg)   LMP 11/02/2015 (Approximate)   BMI 28.39 kg/m²   Constitutional:  Alert and oriented x 3   HENT:  Normocephalic, Atraumatic, Bilateral external ears normal, Oropharynx moist, No oral exudates, Nose normal. Neck- Normal range of motion, No tenderness, Supple, No stridor.   Eyes:  PERRL, Conjunctiva normal, No discharge. Respiratory:  Normal breath sounds, No respiratory distress, No wheezing, No chest tenderness. Cardiovascular:  Normal heart rate, Normal rhythm. GI:  Bowel sounds normal, Soft, No tenderness, No masses, No pulsatile masses. Integument:  Warm, Dry, No erythema, No rash. Lymphatic:  No lymphadenopathy noted. Neurologic:  Alert & oriented x 3, Normal motor function, Normal sensory function, No focal deficits noted. Psychiatric:  Affect normal, Mood normal.     RESULTS  Ordered in this encounter. FINAL DIAGNOSIS AND ORDERS   Diagnosis Orders   1. Well adult exam  Comprehensive Metabolic Panel    CBC with Auto Differential    Magnesium    TSH with Reflex      2. Eye twitch  Comprehensive Metabolic Panel    CBC with Auto Differential    Magnesium    TSH with Reflex      3. Abdominal pressure            ASSESSMENT & PLAN  1. Interval history reviewed with patient. Fasting laboratory studies ordered. Healthy diet and routine exercise. Consider ophthalmology evaluation in the future. Monitor abdominal symptoms. No signs of hernia on examination. Follow-up in 1 year/sooner PRN. DISCHARGE MEDS  Outpatient Encounter Medications as of 8/19/2022   Medication Sig Dispense Refill    albuterol sulfate  (90 Base) MCG/ACT inhaler Inhale 2 puffs into the lungs every 4 hours as needed for Wheezing or Shortness of Breath 18 g 3    Probiotic Product (PROBIOTIC DAILY PO) Take by mouth        No facility-administered encounter medications on file as of 8/19/2022.

## 2022-08-22 ENCOUNTER — PATIENT MESSAGE (OUTPATIENT)
Dept: FAMILY MEDICINE CLINIC | Age: 57
End: 2022-08-22

## 2022-08-22 DIAGNOSIS — R10.9 ABDOMINAL PRESSURE: Primary | ICD-10-CM

## 2022-08-22 NOTE — TELEPHONE ENCOUNTER
From: Mariela Nick  To: Sofia Castillo  Sent: 8/22/2022 10:07 AM EDT  Subject: Questions    Good morning, ladies :)  Just a couple of questions:  1. 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 2. Where do we go from here now that we've done the bloodwork? Lizzy Bateman mentioned that we would start with the bloodwork and see where that takes us. ... Hope your weekend was wonderful!   [facility]

## 2022-08-26 NOTE — TELEPHONE ENCOUNTER
Left detailed voicemail informing patient CT has been ordered and if she doesn't hear from them by Monday to feel free to call and scheduled with the radiology dept.

## 2022-09-09 ENCOUNTER — HOSPITAL ENCOUNTER (OUTPATIENT)
Dept: CT IMAGING | Age: 57
Discharge: HOME | End: 2022-09-11
Payer: COMMERCIAL

## 2022-09-09 DIAGNOSIS — R10.9 ABDOMINAL PRESSURE: ICD-10-CM

## 2022-09-09 PROCEDURE — 2709999900 CT ABDOMEN PELVIS W IV CONTRAST

## 2022-09-09 PROCEDURE — 6360000004 HC RX CONTRAST MEDICATION: Performed by: PHYSICIAN ASSISTANT

## 2022-09-09 RX ADMIN — IOPAMIDOL 100 ML: 755 INJECTION, SOLUTION INTRAVENOUS at 09:24

## 2022-09-12 ENCOUNTER — TELEPHONE (OUTPATIENT)
Dept: FAMILY MEDICINE CLINIC | Age: 57
End: 2022-09-12

## 2022-09-14 ENCOUNTER — HOSPITAL ENCOUNTER (OUTPATIENT)
Dept: ULTRASOUND IMAGING | Age: 57
Discharge: HOME | End: 2022-09-16
Payer: COMMERCIAL

## 2022-09-14 DIAGNOSIS — R10.9 ABDOMINAL PRESSURE: Primary | ICD-10-CM

## 2022-09-14 DIAGNOSIS — R10.9 ABDOMINAL PRESSURE: ICD-10-CM

## 2022-09-14 PROCEDURE — 76856 US EXAM PELVIC COMPLETE: CPT

## 2022-09-15 ENCOUNTER — PATIENT MESSAGE (OUTPATIENT)
Dept: FAMILY MEDICINE CLINIC | Age: 57
End: 2022-09-15

## 2022-09-15 ENCOUNTER — TELEPHONE (OUTPATIENT)
Dept: FAMILY MEDICINE CLINIC | Age: 57
End: 2022-09-15

## 2022-09-16 NOTE — TELEPHONE ENCOUNTER
From: Cortney Crowell  Sent: 9/15/2022 7:20 PM EDT  To: [facility] Clinical Staff  Subject: re ultrasound    Hi Daisy :)  I am sorry if I seem so pushy. I just feel not right. Not getting worse, but not right. I stopped doing my weight workout in hopes that it may help. The pressure in the lower ab and spine area is constant. Now I have periodic tingling in the vagina. I try to tell myself that its menopausal stuff. But its been going on for about a month with no improvement. Just very uncomfortable. Curious. why a surgeon? Not medical smart like you gals ;)  Thank you for putting up with me!   Dawna Benson

## 2022-09-20 ENCOUNTER — OFFICE VISIT (OUTPATIENT)
Dept: FAMILY MEDICINE CLINIC | Age: 57
End: 2022-09-20
Payer: COMMERCIAL

## 2022-09-20 VITALS
SYSTOLIC BLOOD PRESSURE: 122 MMHG | BODY MASS INDEX: 28.17 KG/M2 | HEIGHT: 64 IN | DIASTOLIC BLOOD PRESSURE: 70 MMHG | WEIGHT: 165 LBS | HEART RATE: 76 BPM

## 2022-09-20 DIAGNOSIS — R10.2 PELVIC PRESSURE IN FEMALE: Primary | ICD-10-CM

## 2022-09-20 PROCEDURE — G8427 DOCREV CUR MEDS BY ELIG CLIN: HCPCS | Performed by: PHYSICIAN ASSISTANT

## 2022-09-20 PROCEDURE — 99213 OFFICE O/P EST LOW 20 MIN: CPT | Performed by: PHYSICIAN ASSISTANT

## 2022-09-20 PROCEDURE — 1036F TOBACCO NON-USER: CPT | Performed by: PHYSICIAN ASSISTANT

## 2022-09-20 PROCEDURE — 3017F COLORECTAL CA SCREEN DOC REV: CPT | Performed by: PHYSICIAN ASSISTANT

## 2022-09-20 PROCEDURE — G8419 CALC BMI OUT NRM PARAM NOF/U: HCPCS | Performed by: PHYSICIAN ASSISTANT

## 2022-09-20 NOTE — LETTER
[facility] department of [place] [address]  Phone: [phone]  Fax: [phone]    Derrick Landerosma        September 20, 2022     Patient: Briseyda Benitez   YOB: 1965   Date of Visit: 9/20/2022       To Whom It May Concern: It is my medical opinion that Candice Moreira should not do any heavy lifting until evaluated by gynecologists. If you have any questions or concerns, please don't hesitate to call.     Sincerely,        MALACHI Landeros/Bess

## 2022-09-22 ENCOUNTER — OFFICE VISIT (OUTPATIENT)
Dept: OBGYN | Age: 57
End: 2022-09-22
Payer: COMMERCIAL

## 2022-09-22 ENCOUNTER — HOSPITAL ENCOUNTER (OUTPATIENT)
Dept: LAB | Age: 57
Discharge: HOME | End: 2022-09-22
Payer: COMMERCIAL

## 2022-09-22 VITALS
DIASTOLIC BLOOD PRESSURE: 80 MMHG | HEART RATE: 73 BPM | HEIGHT: 64 IN | SYSTOLIC BLOOD PRESSURE: 112 MMHG | BODY MASS INDEX: 28.25 KG/M2 | WEIGHT: 165.5 LBS | OXYGEN SATURATION: 96 %

## 2022-09-22 DIAGNOSIS — R10.2 PELVIC PRESSURE IN FEMALE: ICD-10-CM

## 2022-09-22 DIAGNOSIS — R39.89 BLADDER PAIN: ICD-10-CM

## 2022-09-22 DIAGNOSIS — K40.20 NON-RECURRENT BILATERAL INGUINAL HERNIA WITHOUT OBSTRUCTION OR GANGRENE: ICD-10-CM

## 2022-09-22 DIAGNOSIS — R10.2 PELVIC PRESSURE IN FEMALE: Primary | ICD-10-CM

## 2022-09-22 LAB
BACTERIA: ABNORMAL
BILIRUBIN URINE: NEGATIVE
EPITHELIAL CELLS UA: ABNORMAL /HPF (ref 0–5)
GLUCOSE URINE: NEGATIVE
KETONES, URINE: NEGATIVE
LEUKOCYTE ESTERASE, URINE: NEGATIVE
NITRITE, URINE: NEGATIVE
PH UA: 6 (ref 5–6)
PROTEIN UA: NEGATIVE
RBC UA: ABNORMAL /HPF (ref 0–4)
SPECIFIC GRAVITY UA: 1.02 (ref 1.01–1.02)
URINE HGB: NEGATIVE
UROBILINOGEN, URINE: NORMAL
WBC UA: ABNORMAL /HPF (ref 0–4)

## 2022-09-22 PROCEDURE — 1036F TOBACCO NON-USER: CPT | Performed by: OBSTETRICS & GYNECOLOGY

## 2022-09-22 PROCEDURE — 81001 URINALYSIS AUTO W/SCOPE: CPT

## 2022-09-22 PROCEDURE — 87086 URINE CULTURE/COLONY COUNT: CPT

## 2022-09-22 PROCEDURE — G8419 CALC BMI OUT NRM PARAM NOF/U: HCPCS | Performed by: OBSTETRICS & GYNECOLOGY

## 2022-09-22 PROCEDURE — 3017F COLORECTAL CA SCREEN DOC REV: CPT | Performed by: OBSTETRICS & GYNECOLOGY

## 2022-09-22 PROCEDURE — 99203 OFFICE O/P NEW LOW 30 MIN: CPT | Performed by: OBSTETRICS & GYNECOLOGY

## 2022-09-22 PROCEDURE — G8427 DOCREV CUR MEDS BY ELIG CLIN: HCPCS | Performed by: OBSTETRICS & GYNECOLOGY

## 2022-09-22 RX ORDER — CIPROFLOXACIN 500 MG/1
500 TABLET, FILM COATED ORAL 2 TIMES DAILY
Qty: 14 TABLET | Refills: 0 | Status: SHIPPED | OUTPATIENT
Start: 2022-09-22 | End: 2022-09-29

## 2022-09-22 RX ORDER — PHENAZOPYRIDINE HYDROCHLORIDE 100 MG/1
100 TABLET, FILM COATED ORAL 3 TIMES DAILY PRN
Qty: 15 TABLET | Refills: 0 | Status: SHIPPED | OUTPATIENT
Start: 2022-09-22 | End: 2022-09-27

## 2022-09-22 ASSESSMENT — ENCOUNTER SYMPTOMS
DIARRHEA: 0
CONSTIPATION: 1
RESPIRATORY NEGATIVE: 1
VOMITING: 0
NAUSEA: 0
BELCHING: 0
ABDOMINAL PAIN: 1

## 2022-09-22 NOTE — PROGRESS NOTES
Anny Souza  2022      Anny Souza is a 62 y.o. female       The patient was seen today. She was here to follow-up regarding her labs and diagnostics ordered at her last visit for the diagnosis of:    ICD-10-CM    1. Pelvic pressure in female  R10.2 Culture, Urine     Urinalysis with Microscopic      2. Bladder pain  R39.89 ciprofloxacin (CIPRO) 500 MG tablet     phenazopyridine (PYRIDIUM) 100 MG tablet          Her bowels are regular and she is voiding without difficulty. She states pressure beginning about 8 weeks ago and progressing . She denies any discharge and has no uti s/s. Monogamous relationship. She had a CT and this noted BL inguinal hernias, diverticulosis and small hiatal hernia-Surgery referral made. Pap is current as well as mammogram. NO PMB or PCB. Past Medical History:   Diagnosis Date    Acid reflux     Anemia     COVID-19     GERD (gastroesophageal reflux disease)     Hx of migraine headaches     Menorrhagia          Past Surgical History:   Procedure Laterality Date    BREAST SURGERY Right 2014    right breast biopsy negative ? Dr. Miguelina Hayes  2010    UPPER GASTROINTESTINAL ENDOSCOPY  ,     neg    WISDOM TOOTH EXTRACTION           Family History   Problem Relation Age of Onset    Other Maternal Grandfather         dementia  bio grandmother    Heart Disease Maternal Grandfather     Hypertension Father     Elevated Lipids Father     Other Father         pulmonary fibrosis    Lung Cancer Father     High Blood Pressure Father     Cancer Brother         kidney    Arthritis Mother         one knee replaced    High Cholesterol Mother         manages mostly with diet    Miscarriages / Stillbirths Mother         stillbirth at 42 weeks    Heart Disease Mother     High Blood Pressure Mother     Cancer Mother         skin CA    Asthma Brother     Other Brother         bicuspid aortic valve    Heart Disease Paternal Grandfather 80    Heart Disease Paternal Grandmother     Other Son         lymphomatoid papullosis seen at [facility]. Other Daughter         lymphatoid papulosus         Social History     Tobacco Use    Smoking status: Never    Smokeless tobacco: Never   Vaping Use    Vaping Use: Never used   Substance Use Topics    Alcohol use: Yes     Alcohol/week: 18.0 standard drinks     Types: 18 Cans of beer per week    Drug use: No         MEDICATIONS:  Current Outpatient Medications   Medication Sig Dispense Refill    ciprofloxacin (CIPRO) 500 MG tablet Take 1 tablet by mouth 2 times daily for 7 days 14 tablet 0    phenazopyridine (PYRIDIUM) 100 MG tablet Take 1 tablet by mouth 3 times daily as needed for Pain 15 tablet 0    Probiotic Product (PROBIOTIC DAILY PO) Take by mouth       albuterol sulfate  (90 Base) MCG/ACT inhaler Inhale 2 puffs into the lungs every 4 hours as needed for Wheezing or Shortness of Breath 18 g 3     No current facility-administered medications for this visit. ALLERGIES:  Allergies as of 09/22/2022    (No Known Allergies)         Blood pressure 112/80, pulse 73, height 5' 4\" (1.626 m), weight 165 lb 8 oz (75.1 kg), last menstrual period 11/02/2015, SpO2 96 %, not currently breastfeeding. Chaperone for Intimate Exam  Chaperone was offered and accepted as part of the rooming process. Chaperone: Afua        Abdomen: Soft non-tender; good bowel sounds. No guarding, rebound or rigidity. No CVA tenderness bilaterally.     Extremities: No calf tenderness, DTR 2/4, and No edema bilaterally    Pelvic: Requested     External genitalia: hair loss and fat pad loss  Urinary system: urethral meatus normal and bladder tenderness  Vaginal: normal rugae and cystocele present, 1  Cervix: normal appearance  Adnexa: normal bimanual exam and non palpable  Uterus: normal single, nontender, anteverted, and mid-position    Diagnostics:  CT ABDOMEN PELVIS W IV CONTRAST Additional Contrast? None    Result Date: 9/12/2022  EXAMINATION: CT OF THE ABDOMEN AND PELVIS WITH CONTRAST 9/9/2022 9:11 am TECHNIQUE: CT of the abdomen and pelvis was performed with the administration of intravenous contrast. Multiplanar reformatted images are provided for review. Automated exposure control, iterative reconstruction, and/or weight based adjustment of the mA/kV was utilized to reduce the radiation dose to as low as reasonably achievable. COMPARISON: None. HISTORY: ORDERING SYSTEM PROVIDED HISTORY: Abdominal pressure TECHNOLOGIST PROVIDED HISTORY: STAT Creatinine as needed:->No abdominal pressure Reason for Exam: c/o lower abd pressure FINDINGS: Lower Chest: Minimal bibasilar dependent atelectasis with no acute findings. Organs: No acute abnormality of the liver, spleen, adrenal glands, pancreas, or gallbladder. Both kidneys enhance without hydronephrosis. GI/Bowel: Evaluation of bowel is limited by lack of oral contrast.  Small hiatal hernia. There are a few mildly prominent small bowel loops without signs of bowel obstruction. Diverticulosis of the colon, most notable in the descending and sigmoid region. No convincing CT evidence of diverticulitis. Mild fecal stasis in the ascending colon. No convincing CT evidence of appendicitis. Pelvis: No free fluid in the pelvis. Small bilateral fat-containing inguinal hernias. No bulky pelvic adenopathy. The uterus is present with questionable slight prominence of the endometrium in this presumed postmenopausal patient. Correlate with clinical findings to determine need for follow-up pelvic ultrasound. Peritoneum/Retroperitoneum: No evidence of abdominal aortic aneurysm. A few scattered nonenlarged retroperitoneal lymph nodes without bulky adenopathy. Small fat-containing umbilical hernia. Bones/Soft Tissues: Bilateral pars defects at L5 without significant spondylolisthesis. There are a couple of tiny presumed bone islands in the pelvis.   Degenerative changes and subtle sclerosis of the SI joints with partial bony bridging on the right. Degenerative changes of the spine with no acute osseous abnormality. No acute abnormality identified in the abdomen or pelvis. Small hiatal hernia. Diverticulosis of the colon. US PELVIS COMPLETE NON-OB TRANSABDOMINAL AND TRANSVAGINAL    Result Date: 9/15/2022  EXAMINATION: TRANSABDOMINAL AND TRANSVAGINAL PELVIC ULTRASOUND 9/14/2022 TECHNIQUE: Transabdominal and transvaginal pelvic ultrasound was performed. COMPARISON: CT and pelvis from 09/09/2022 HISTORY: ORDERING SYSTEM PROVIDED HISTORY: Abdominal pressure TECHNOLOGIST PROVIDED HISTORY: Reason for Exam: pelvic pressure Relevant Medical/Surgical History: hx of csection 25-year-old female with abdominal pressure FINDINGS: Measurements: Uterus: 6.1 x 4.6 x 3.4 cm. Endometrial stripe: 5 mm. Right Ovary:2.4 x 1.6 x 1.1 cm. Left Ovary: 2.4 x 2.2 x 1.2 cm. Ultrasound Findings: Uterus: Uterus demonstrates normal myometrial echotexture. Anteverted uterus. No uterine mass or fibroid. Endometrial stripe: Endometrial stripe is within normal limits. Nabothian cysts in the cervix. Right Ovary: Right ovary is within normal limits. Left Ovary:  Left ovary is within normal limits. Color flow projects over the bilateral ovarian parenchyma. Free Fluid: No evidence of free fluid. 1. Anteverted uterus. No uterine mass or fibroid. 2. Nabothian cysts in the cervix. 3. Normal appearance of the ovaries. 4. Endometrial stripe thickness measuring 5 mm, within normal limits.          Lab Results:  Results for orders placed or performed during the hospital encounter of 08/19/22   TSH with Reflex   Result Value Ref Range    TSH 1.13 0.30 - 5.00 uIU/mL   Magnesium   Result Value Ref Range    Magnesium 2.2 1.6 - 2.6 mg/dL   CBC with Auto Differential   Result Value Ref Range    WBC 6.1 3.5 - 11.3 k/uL    RBC 4.69 3.95 - 5.11 m/uL    Hemoglobin 14.6 11.9 - 15.1 g/dL    Hematocrit 42.9 36.3 - 47.1 %    MCV 91.5 82.6 - 102.9 fL    MCH 31.1 25.2 - 33.5 pg    MCHC 34.0 (H) 25.2 - 33.5 g/dL    RDW 12.8 11.8 - 14.4 %    Platelets 709 133 - 389 k/uL    MPV 11.0 8.1 - 13.5 fL    NRBC Automated 0.0 0.0 per 100 WBC    Seg Neutrophils 58 36 - 65 %    Lymphocytes 30 24 - 43 %    Monocytes 6 3 - 12 %    Eosinophils % 4 1 - 4 %    Basophils 1 0 - 2 %    Immature Granulocytes 1 (H) 0 %    Segs Absolute 3.60 1.50 - 8.10 k/uL    Absolute Lymph # 1.85 1.10 - 3.70 k/uL    Absolute Mono # 0.35 0.10 - 1.20 k/uL    Absolute Eos # 0.22 0.00 - 0.44 k/uL    Basophils Absolute 0.03 0.00 - 0.20 k/uL    Absolute Immature Granulocyte 0.03 0.00 - 0.30 k/uL   Comprehensive Metabolic Panel   Result Value Ref Range    Glucose 111 (H) 70 - 99 mg/dL    BUN 15 6 - 20 mg/dL    Creatinine 0.81 0.50 - 0.90 mg/dL    Bun/Cre Ratio 19 9 - 20    Calcium 9.6 8.6 - 10.4 mg/dL    Sodium 140 135 - 144 mmol/L    Potassium 4.2 3.7 - 5.3 mmol/L    Chloride 102 98 - 107 mmol/L    CO2 30 20 - 31 mmol/L    Anion Gap 8 (L) 9 - 17 mmol/L    Alkaline Phosphatase 91 35 - 104 U/L    ALT 18 5 - 33 U/L    AST 18 <32 U/L    Total Bilirubin 0.31 0.3 - 1.2 mg/dL    Total Protein 6.8 6.4 - 8.3 g/dL    Albumin 4.4 3.5 - 5.2 g/dL    Albumin/Globulin Ratio 1.8 1.0 - 2.5    GFR Non-African American >60 >60 mL/min    GFR African American >60 >60 mL/min    GFR Comment                 Assessment:   Diagnosis Orders   1. Pelvic pressure in female  Culture, Urine    Urinalysis with Microscopic      2.  Bladder pain  ciprofloxacin (CIPRO) 500 MG tablet    phenazopyridine (PYRIDIUM) 100 MG tablet        Chief Complaint   Patient presents with    New Patient    Pelvic Pain         Patient Active Problem List    Diagnosis Date Noted    Hypoxia     COVID-19 09/10/2021    Acid reflux     Hx of migraine headaches     Menorrhagia        PLAN:  Return in about 5 months (around 2/14/2023) for Annual.  UA C&S today  Hygiene review  General surgery referral-Colonoscopy screen , hernias   Return to the office in 2/2023 annual weeks. If uti on UA then RX Ciprofloxacin 500 mg po bid x 7 days and pyridium 100 mg po tid x 5 days  Barrier recommendations and STD counseling was completed  Counseled on preventative health maintenance follow-up. Orders Placed This Encounter   Procedures    Culture, Urine     Standing Status:   Future     Standing Expiration Date:   10/20/2022     Order Specific Question:   Specify (ex-cath, midstream, cysto, etc)? Answer:   MID STREAM    Urinalysis with Microscopic     Standing Status:   Future     Standing Expiration Date:   1/20/2023     Order Specific Question:   SPECIFY(EX-CATH,MIDSTREAM,CYSTO,ETC)? Answer:   midstream           The patient, Matilde Brooke is a 62 y.o. female, was seen with a total time spent of 20 minutes for the visit on this date of service by the E/M provider. The time component had both face to face and non face to face time spent in determining the total time component. Counseling and education regarding her diagnosis listed below and her options regarding those diagnoses were also included in determining her time component. Diagnosis Orders   1. Pelvic pressure in female  Culture, Urine    Urinalysis with Microscopic      2. Bladder pain  ciprofloxacin (CIPRO) 500 MG tablet    phenazopyridine (PYRIDIUM) 100 MG tablet           The patient had her preventative health maintenance recommendations and follow-up reviewed with her at the completion of her visit.

## 2022-09-22 NOTE — PROGRESS NOTES
pressure Reason for Exam: c/o lower abd pressure FINDINGS: Lower Chest: Minimal bibasilar dependent atelectasis with no acute findings. Organs: No acute abnormality of the liver, spleen, adrenal glands, pancreas, or gallbladder. Both kidneys enhance without hydronephrosis. GI/Bowel: Evaluation of bowel is limited by lack of oral contrast.  Small hiatal hernia. There are a few mildly prominent small bowel loops without signs of bowel obstruction. Diverticulosis of the colon, most notable in the descending and sigmoid region. No convincing CT evidence of diverticulitis. Mild fecal stasis in the ascending colon. No convincing CT evidence of appendicitis. Pelvis: No free fluid in the pelvis. Small bilateral fat-containing inguinal hernias. No bulky pelvic adenopathy. The uterus is present with questionable slight prominence of the endometrium in this presumed postmenopausal patient. Correlate with clinical findings to determine need for follow-up pelvic ultrasound. Peritoneum/Retroperitoneum: No evidence of abdominal aortic aneurysm. A few scattered nonenlarged retroperitoneal lymph nodes without bulky adenopathy. Small fat-containing umbilical hernia. Bones/Soft Tissues: Bilateral pars defects at L5 without significant spondylolisthesis. There are a couple of tiny presumed bone islands in the pelvis. Degenerative changes and subtle sclerosis of the SI joints with partial bony bridging on the right. Degenerative changes of the spine with no acute osseous abnormality. No acute abnormality identified in the abdomen or pelvis. Small hiatal hernia. Diverticulosis of the colon. US PELVIS COMPLETE NON-OB TRANSABDOMINAL AND TRANSVAGINAL    Result Date: 9/15/2022  EXAMINATION: TRANSABDOMINAL AND TRANSVAGINAL PELVIC ULTRASOUND 9/14/2022 TECHNIQUE: Transabdominal and transvaginal pelvic ultrasound was performed.  COMPARISON: CT and pelvis from 09/09/2022 HISTORY: ORDERING SYSTEM PROVIDED HISTORY: Abdominal pressure TECHNOLOGIST PROVIDED HISTORY: Reason for Exam: pelvic pressure Relevant Medical/Surgical History: hx of csection 59-year-old female with abdominal pressure FINDINGS: Measurements: Uterus: 6.1 x 4.6 x 3.4 cm. Endometrial stripe: 5 mm. Right Ovary:2.4 x 1.6 x 1.1 cm. Left Ovary: 2.4 x 2.2 x 1.2 cm. Ultrasound Findings: Uterus: Uterus demonstrates normal myometrial echotexture. Anteverted uterus. No uterine mass or fibroid. Endometrial stripe: Endometrial stripe is within normal limits. Nabothian cysts in the cervix. Right Ovary: Right ovary is within normal limits. Left Ovary:  Left ovary is within normal limits. Color flow projects over the bilateral ovarian parenchyma. Free Fluid: No evidence of free fluid. 1. Anteverted uterus. No uterine mass or fibroid. 2. Nabothian cysts in the cervix. 3. Normal appearance of the ovaries. 4. Endometrial stripe thickness measuring 5 mm, within normal limits. Assessment:      1. Pelvic pressure in female          Plan:      CT and PUS reviewed with patient. Referral to gynecology for evaluation. Continue PRN NSAIDs. Supportive care advised. Follow-up PRN/as planned for routine medical care.         MALACHI Viveros

## 2022-09-22 NOTE — PROGRESS NOTES
Gaylord Evan  2022      Miguelina Evan is a 62 y.o. female       The patient was seen today. She was here to follow-up regarding her labs and diagnostics ordered at her last visit for the diagnosis of:    ICD-10-CM    1. Pelvic pressure in female  R10.2 Culture, Urine     Urinalysis with Microscopic      2. Bladder pain  R39.89 ciprofloxacin (CIPRO) 500 MG tablet     phenazopyridine (PYRIDIUM) 100 MG tablet      3. Non-recurrent bilateral inguinal hernia without obstruction or gangrene  K40.20 Consuelo Ortiz MD, General Surgery, [place]          Her bowels are regular and she is voiding without difficulty. Past Medical History:   Diagnosis Date    Acid reflux     Anemia     COVID-19     GERD (gastroesophageal reflux disease)     Hx of migraine headaches     Menorrhagia          Past Surgical History:   Procedure Laterality Date    BREAST SURGERY Right 2014    right breast biopsy negative ? Dr. Ziggy Martinez  2010    UPPER GASTROINTESTINAL ENDOSCOPY  ,     neg    WISDOM TOOTH EXTRACTION           Family History   Problem Relation Age of Onset    Other Maternal Grandfather         dementia  bio grandmother    Heart Disease Maternal Grandfather     Hypertension Father     Elevated Lipids Father     Other Father         pulmonary fibrosis    Lung Cancer Father     High Blood Pressure Father     Cancer Brother         kidney    Arthritis Mother         one knee replaced    High Cholesterol Mother         manages mostly with diet    Miscarriages / Stillbirths Mother         stillbirth at 42 weeks    Heart Disease Mother     High Blood Pressure Mother     Cancer Mother         skin CA    Asthma Brother     Other Brother         bicuspid aortic valve    Heart Disease Paternal Grandfather 80    Heart Disease Paternal Grandmother     Other Son         lymphomatoid papullosis seen [facility].      Other Daughter         lymphatoid papulosus         Social History     Tobacco Use    Smoking status: Never    Smokeless tobacco: Never   Vaping Use    Vaping Use: Never used   Substance Use Topics    Alcohol use: Yes     Alcohol/week: 18.0 standard drinks     Types: 18 Cans of beer per week    Drug use: No         MEDICATIONS:  Current Outpatient Medications   Medication Sig Dispense Refill    ciprofloxacin (CIPRO) 500 MG tablet Take 1 tablet by mouth 2 times daily for 7 days 14 tablet 0    phenazopyridine (PYRIDIUM) 100 MG tablet Take 1 tablet by mouth 3 times daily as needed for Pain 15 tablet 0    Probiotic Product (PROBIOTIC DAILY PO) Take by mouth       albuterol sulfate  (90 Base) MCG/ACT inhaler Inhale 2 puffs into the lungs every 4 hours as needed for Wheezing or Shortness of Breath 18 g 3     No current facility-administered medications for this visit. ALLERGIES:  Allergies as of 09/22/2022    (No Known Allergies)         Blood pressure 112/80, pulse 73, height 5' 4\" (1.626 m), weight 165 lb 8 oz (75.1 kg), last menstrual period 11/02/2015, SpO2 96 %, not currently breastfeeding. Chaperone for Intimate Exam  Chaperone was offered and accepted as part of the rooming process. Chaperone: Afua        Abdomen: Soft non-tender; good bowel sounds. No guarding, rebound or rigidity. No CVA tenderness bilaterally.     Extremities: No calf tenderness, DTR 2/4, and No edema bilaterally    Pelvic: Requested     External genitalia: normal general appearance  Urinary system: urethral meatus normal and bladder tender to palpation  Vaginal: normal rugae; cystocele grade 1  Cervix: normal appearance  Adnexa: normal bimanual exam and non palpable  Uterus: normal single, nontender, anteverted, and mid-position    Diagnostics:  CT ABDOMEN PELVIS W IV CONTRAST Additional Contrast? None    Result Date: 9/12/2022  EXAMINATION: CT OF THE ABDOMEN AND PELVIS WITH CONTRAST 9/9/2022 9:11 am TECHNIQUE: CT of the abdomen and pelvis was performed with abnormality identified in the abdomen or pelvis. Small hiatal hernia. Diverticulosis of the colon. US PELVIS COMPLETE NON-OB TRANSABDOMINAL AND TRANSVAGINAL    Result Date: 9/15/2022  EXAMINATION: TRANSABDOMINAL AND TRANSVAGINAL PELVIC ULTRASOUND 9/14/2022 TECHNIQUE: Transabdominal and transvaginal pelvic ultrasound was performed. COMPARISON: CT and pelvis from 09/09/2022 HISTORY: ORDERING SYSTEM PROVIDED HISTORY: Abdominal pressure TECHNOLOGIST PROVIDED HISTORY: Reason for Exam: pelvic pressure Relevant Medical/Surgical History: hx of csection 26-year-old female with abdominal pressure     FINDINGS: Measurements: Uterus: 6.1 x 4.6 x 3.4 cm. Endometrial stripe: 5 mm. Right Ovary:2.4 x 1.6 x 1.1 cm. Left Ovary: 2.4 x 2.2 x 1.2 cm. Ultrasound Findings: Uterus: Uterus demonstrates normal myometrial echotexture. Anteverted uterus. No uterine mass or fibroid. Endometrial stripe: Endometrial stripe is within normal limits. Nabothian cysts in the cervix. Right Ovary: Right ovary is within normal limits. Left Ovary:  Left ovary is within normal limits. Color flow projects over the bilateral ovarian parenchyma. Free Fluid: No evidence of free fluid. 1. Anteverted uterus. No uterine mass or fibroid. 2. Nabothian cysts in the cervix. 3. Normal appearance of the ovaries. 4. Endometrial stripe thickness measuring 5 mm, within normal limits.          Lab Results:  Results for orders placed or performed during the hospital encounter of 08/19/22   TSH with Reflex   Result Value Ref Range    TSH 1.13 0.30 - 5.00 uIU/mL   Magnesium   Result Value Ref Range    Magnesium 2.2 1.6 - 2.6 mg/dL   CBC with Auto Differential   Result Value Ref Range    WBC 6.1 3.5 - 11.3 k/uL    RBC 4.69 3.95 - 5.11 m/uL    Hemoglobin 14.6 11.9 - 15.1 g/dL    Hematocrit 42.9 36.3 - 47.1 %    MCV 91.5 82.6 - 102.9 fL    MCH 31.1 25.2 - 33.5 pg    MCHC 34.0 (H) 25.2 - 33.5 g/dL    RDW 12.8 11.8 - 14.4 %    Platelets 475 142 - 607 k/uL    MPV 11.0 8.1 - 13.5 fL    NRBC Automated 0.0 0.0 per 100 WBC    Seg Neutrophils 58 36 - 65 %    Lymphocytes 30 24 - 43 %    Monocytes 6 3 - 12 %    Eosinophils % 4 1 - 4 %    Basophils 1 0 - 2 %    Immature Granulocytes 1 (H) 0 %    Segs Absolute 3.60 1.50 - 8.10 k/uL    Absolute Lymph # 1.85 1.10 - 3.70 k/uL    Absolute Mono # 0.35 0.10 - 1.20 k/uL    Absolute Eos # 0.22 0.00 - 0.44 k/uL    Basophils Absolute 0.03 0.00 - 0.20 k/uL    Absolute Immature Granulocyte 0.03 0.00 - 0.30 k/uL   Comprehensive Metabolic Panel   Result Value Ref Range    Glucose 111 (H) 70 - 99 mg/dL    BUN 15 6 - 20 mg/dL    Creatinine 0.81 0.50 - 0.90 mg/dL    Bun/Cre Ratio 19 9 - 20    Calcium 9.6 8.6 - 10.4 mg/dL    Sodium 140 135 - 144 mmol/L    Potassium 4.2 3.7 - 5.3 mmol/L    Chloride 102 98 - 107 mmol/L    CO2 30 20 - 31 mmol/L    Anion Gap 8 (L) 9 - 17 mmol/L    Alkaline Phosphatase 91 35 - 104 U/L    ALT 18 5 - 33 U/L    AST 18 <32 U/L    Total Bilirubin 0.31 0.3 - 1.2 mg/dL    Total Protein 6.8 6.4 - 8.3 g/dL    Albumin 4.4 3.5 - 5.2 g/dL    Albumin/Globulin Ratio 1.8 1.0 - 2.5    GFR Non-African American >60 >60 mL/min    GFR African American >60 >60 mL/min    GFR Comment             Component 2/6/19 0932    Cytology Report [id number]   [facility]   CONSULTING PATHOLOGISTS "[facility]   ANATOMIC PATHOLOGY   [address] [place] [address],  [address] [facility], [address]   [phone]   Fax: [phone]   GYNECOLOGIC CYTOLOGY REPORT     Patient Name: Mario Nesbitt   MR#: [id number]   Specimen #[id number]   Source:   1: Cervical material, (ThinPrep vial, Imaging-assisted review)     Clinical History   Postmenopausal   Z12.4 Encounter for screening for malignant neoplasm of cervix   Co-Test:  ThinPrep Pap with high risk HPV testing   Patient Address: [address] [address] , [place], [place]. [address]   LMP:  11/2/15   INTERPRETATION     Cervical material, (ThinPrep vial, Imaging-assisted review):   Specimen Adequacy:        Satisfactory for evaluation.         - Endocervical/transformation zone component present. Descriptive Diagnosis:        Negative for intraepithelial lesion or malignancy. Comments:        High Risk HPV testing was ordered. Cytotechnologist:   Charley Youssef. MARILUZ Contreras(ASCP)   **Electronically Signed Out**   fabiola/2/19/2019           Procedure/Addendum   HPV Procedure Report     Date Ordered:     2/7/2019     Status: Signed   Out        Date Complete:     2/7/2019     By: MARILUZ Barker(ASCP)        Date Reported:     2/21/2019       INTERPRETATION   Roche HPV DNA High Risk                                   HPV Sample               Thin Prep                    (Ref Range)   HPV Type 16               Not Detected                    (Not   Detected)   HPV Type 18               Not Detected                    (Not   Detected)   Other High Risk HPV     Not Detected                    (Not Detected)        This test amplifies and detects DNA of 14 high-risk HPV types   associated with cervical cancer and its precursor lesions (HPV types   16, 18, 31, 33, 35, 39, 45, 51, 52, 56, 58, 59, 66, and 68). Sensitivity may be affected by specimen collection methods, stage of   infection, and the presence of interfering substances. Results should   be interpreted in conjunction with other available laboratory and   clinical data. A negative high-risk HPV result does not exclude the   possibility of future cytologic HSIL or underlying CIN2-3 or cancer. This test is intended for medical purposes only and is not valid for   the evaluation of suspected sexual abuse or for other forensic   purposes. Assessment:   Diagnosis Orders   1. Pelvic pressure in female  Culture, Urine    Urinalysis with Microscopic      2. Bladder pain  ciprofloxacin (CIPRO) 500 MG tablet    phenazopyridine (PYRIDIUM) 100 MG tablet      3.  Non-recurrent bilateral inguinal hernia without obstruction or gangrene  Melba Voss MD, General Surgery, [place]        Chief Complaint Patient presents with    New Patient    Pelvic Pain         Patient Active Problem List    Diagnosis Date Noted    Pelvic pressure in female 09/22/2022     Priority: Medium    Bladder pain 09/22/2022     Priority: Medium    Hypoxia     COVID-19 09/10/2021    Acid reflux     Hx of migraine headaches        PLAN:  Return in about 5 months (around 2/14/2023) for Annual.  General surgery referral  Return to the office in 2/2023 weeks. Barrier recommendations and STD counseling was completed  Counseled on preventative health maintenance follow-up. Orders Placed This Encounter   Procedures    Culture, Urine     Standing Status:   Future     Standing Expiration Date:   10/20/2022     Order Specific Question:   Specify (ex-cath, midstream, cysto, etc)? Answer:   MID STREAM    Urinalysis with Microscopic     Standing Status:   Future     Standing Expiration Date:   1/20/2023     Order Specific Question:   SPECIFY(EX-CATH,MIDSTREAM,CYSTO,ETC)? Answer:   midstream    Court MD Ja, General Surgery, [place]     Referral Priority:   Routine     Referral Type:   Eval and Treat     Referral Reason:   Specialty Services Required     Referred to Provider:   Ousmane Gonzalez MD     Requested Specialty:   General Surgery     Number of Visits Requested:   1           The patient, Magali Rodriguez is a 62 y.o. female, was seen with a total time spent of 20 minutes for the visit on this date of service by the E/M provider. The time component had both face to face and non face to face time spent in determining the total time component. Counseling and education regarding her diagnosis listed below and her options regarding those diagnoses were also included in determining her time component. Diagnosis Orders   1. Pelvic pressure in female  Culture, Urine    Urinalysis with Microscopic      2. Bladder pain  ciprofloxacin (CIPRO) 500 MG tablet    phenazopyridine (PYRIDIUM) 100 MG tablet      3.  Non-recurrent bilateral inguinal hernia without obstruction or gangrene  Artemio Matthew MD, General Surgery, [place]           The patient had her preventative health maintenance recommendations and follow-up reviewed with her at the completion of her visit.

## 2022-09-23 LAB
CULTURE: NORMAL
SPECIMEN DESCRIPTION: NORMAL

## 2022-09-26 ENCOUNTER — PATIENT MESSAGE (OUTPATIENT)
Dept: FAMILY MEDICINE CLINIC | Age: 57
End: 2022-09-26

## 2022-09-26 DIAGNOSIS — R10.2 PELVIC PRESSURE IN FEMALE: ICD-10-CM

## 2022-09-26 DIAGNOSIS — K40.20 NON-RECURRENT BILATERAL INGUINAL HERNIA WITHOUT OBSTRUCTION OR GANGRENE: Primary | ICD-10-CM

## 2022-09-26 DIAGNOSIS — K57.90 DIVERTICULOSIS: ICD-10-CM

## 2022-09-26 DIAGNOSIS — R39.89 BLADDER PAIN: Primary | ICD-10-CM

## 2022-09-26 DIAGNOSIS — K44.9 HIATAL HERNIA: ICD-10-CM

## 2022-09-26 NOTE — TELEPHONE ENCOUNTER
From: Magali Rodriguez  To: Ethel Schultz  Sent: 9/26/2022 3:14 PM EDT  Subject: [place] Lim again    Hello :)  Dr. Alda Tolentino recommended surgery for the hernia. What are your thoughts on this? He also mentioned doing an actual Colonoscopy to check on the Diverticulosis. Thoughts on that? I ask, because you know me. He doesn't :) I trust you. He has also referred me onto a Urologist, who I see 10/11 in [place]. Have we done anything to check on that pulled muscle, per chance? I just keep thinking that this all can't be just a coincidence. And I really want it to go away so that I can get back to happy normalcy. ...

## 2022-09-27 ENCOUNTER — INITIAL CONSULT (OUTPATIENT)
Dept: SURGERY | Age: 57
End: 2022-09-27
Payer: COMMERCIAL

## 2022-09-27 VITALS
HEART RATE: 68 BPM | BODY MASS INDEX: 28.17 KG/M2 | SYSTOLIC BLOOD PRESSURE: 118 MMHG | HEIGHT: 64 IN | DIASTOLIC BLOOD PRESSURE: 64 MMHG | WEIGHT: 165 LBS | OXYGEN SATURATION: 98 %

## 2022-09-27 DIAGNOSIS — K40.20 NON-RECURRENT BILATERAL INGUINAL HERNIA WITHOUT OBSTRUCTION OR GANGRENE: Primary | ICD-10-CM

## 2022-09-27 DIAGNOSIS — Z01.811 PRE-OP CHEST EXAM: ICD-10-CM

## 2022-09-27 PROCEDURE — 1036F TOBACCO NON-USER: CPT | Performed by: SURGERY

## 2022-09-27 PROCEDURE — 3017F COLORECTAL CA SCREEN DOC REV: CPT | Performed by: SURGERY

## 2022-09-27 PROCEDURE — G8419 CALC BMI OUT NRM PARAM NOF/U: HCPCS | Performed by: SURGERY

## 2022-09-27 PROCEDURE — 99204 OFFICE O/P NEW MOD 45 MIN: CPT | Performed by: SURGERY

## 2022-09-27 PROCEDURE — G8427 DOCREV CUR MEDS BY ELIG CLIN: HCPCS | Performed by: SURGERY

## 2022-09-27 RX ORDER — M-VIT,TX,IRON,MINS/CALC/FOLIC 27MG-0.4MG
1 TABLET ORAL DAILY
COMMUNITY

## 2022-09-27 RX ORDER — MULTIVIT-MIN/IRON/FOLIC ACID/K 18-600-40
CAPSULE ORAL
COMMUNITY

## 2022-09-28 PROBLEM — K40.20 NON-RECURRENT BILATERAL INGUINAL HERNIA WITHOUT OBSTRUCTION OR GANGRENE: Status: ACTIVE | Noted: 2022-09-28

## 2022-09-28 NOTE — PROGRESS NOTES
Myrtle Nuñez is a 62 y.o. female who presents today for further evaluation of bilateral inguinal hernias. The patient is referred from her gynecologist.  On discussion she states that for the past several months she has been having pain that seems to have started kind of in the low back but more recently has begun to have pressure type pain in her pelvis. Does seem that this is exacerbated with physical activity and she states that often times as she is going about her day doing walking or other physical activities that she will notice that the pain worsens. Typically gets better with rest.  Because of her symptoms she initially thought that this may be related to pelvic organs and was seen by gynecology. She did have pelvic ultrasound as well as a CT scan of the abdomen pelvis ordered. There was no acute findings but the CT did demonstrate a small incidental hiatal hernia as well as bilateral fat-containing inguinal hernias. As there is been no other clear diagnosis to suggest the cause of her pelvic symptoms it was felt that this may be related to her hernias. On discussion patient denies any specific inciting activity to start the pain. However she does report that she stays very active and does a lot of weight training as well as a lot of cardiovascular training. She has started to cut back on the weight training due to worsening of symptoms when she is doing weight lifting. Denies any changes in bowel or bladder habits. No nausea or emesis. Has not noticed any noticeable bulges in the groin. Past Medical History:   Diagnosis Date    Acid reflux     Anemia     COVID-19     GERD (gastroesophageal reflux disease)     Hx of migraine headaches     Menorrhagia        Past Surgical History:   Procedure Laterality Date    BREAST SURGERY Right 01/01/2014    right breast biopsy negative ? Dr. Silvio Villa  12/28/2010    UPPER GASTROINTESTINAL ENDOSCOPY 2005, 2010    neg    WISDOM TOOTH EXTRACTION         Current Outpatient Medications   Medication Sig Dispense Refill    Cholecalciferol (VITAMIN D) 50 MCG (2000 UT) CAPS capsule Take by mouth      Multiple Vitamins-Minerals (THERAPEUTIC MULTIVITAMIN-MINERALS) tablet Take 1 tablet by mouth daily      ciprofloxacin (CIPRO) 500 MG tablet Take 1 tablet by mouth 2 times daily for 7 days 14 tablet 0    Probiotic Product (PROBIOTIC DAILY PO) Take by mouth       albuterol sulfate  (90 Base) MCG/ACT inhaler Inhale 2 puffs into the lungs every 4 hours as needed for Wheezing or Shortness of Breath 18 g 3     No current facility-administered medications for this visit. No Known Allergies    Family History   Problem Relation Age of Onset    Other Maternal Grandfather         dementia  bio grandmother    Heart Disease Maternal Grandfather     Hypertension Father     Elevated Lipids Father     Other Father         pulmonary fibrosis    Lung Cancer Father     High Blood Pressure Father     Cancer Brother         kidney    Arthritis Mother         one knee replaced    High Cholesterol Mother         manages mostly with diet    Miscarriages / Stillbirths Mother         stillbirth at 42 weeks    Heart Disease Mother     High Blood Pressure Mother     Cancer Mother         skin CA    Asthma Brother     Other Brother         bicuspid aortic valve    Heart Disease Paternal Grandfather 80    Heart Disease Paternal Grandmother     Other Son         lymphomatoid papullosis seen at [facility].      Other Daughter         lymphatoid papulosus       Social History     Socioeconomic History    Marital status:      Spouse name: Not on file    Number of children: Not on file    Years of education: Not on file    Highest education level: Not on file   Occupational History    Not on file   Tobacco Use    Smoking status: Never    Smokeless tobacco: Never   Vaping Use    Vaping Use: Never used   Substance and Sexual Activity Alcohol use: Yes     Alcohol/week: 18.0 standard drinks     Types: 18 Cans of beer per week    Drug use: No    Sexual activity: Yes     Partners: Male   Other Topics Concern    Not on file   Social History Narrative    Not on file     Social Determinants of Health     Financial Resource Strain: Low Risk     Difficulty of Paying Living Expenses: Not hard at all   Food Insecurity: No Food Insecurity    Worried About [address] in the Last Year: Never true    [address] [facility] [address] in the Last Year: Never true   Transportation Needs: No Transportation Needs    Lack of Transportation (Medical): No    Lack of Transportation (Non-Medical): No   Physical Activity: Not on file   Stress: Not on file   Social Connections: Not on file   Intimate Partner Violence: Not on file   Housing Stability: Not on file       ROS:   Review of Systems - General ROS: negative  Psychological ROS: negative  Ophthalmic ROS: negative  ENT ROS: negative  Respiratory ROS: no cough, shortness of breath, or wheezing  Cardiovascular ROS: no chest pain or dyspnea on exertion  Gastrointestinal ROS: per HPI  Genito-Urinary ROS: no dysuria, trouble voiding, or hematuria  Musculoskeletal ROS: negative  Dermatological ROS: negative      Objective   Vitals:    09/27/22 1533   BP: 118/64   Pulse: 68   SpO2: 98%     General:in no apparent distress and well developed and well nourished  Eyes: No gross abnormalities. Ears, Nose, Throat: hearing grossly normal bilaterally  Neck: neck supple and non tender without mass  Lungs: clear to auscultation without wheezes or rales   Heart: S1S2, no mumurs, RRR  Abdomen: Soft, nondistended, nontender to palpation, bowel sounds present. No noticeable bulge in groin. There is some laxity of the muscle with Valsalva but I do not appreciate any significant herniation of tissue. Extremity: negative  Neuro: CN II-XII grossly intact      1.  Non-recurrent bilateral inguinal hernia without obstruction or gangrene  Assessment & Plan:  The patient does have bilateral fat-containing inguinal hernias on CT scan. I have discussed inguinal hernia with the patient and we also discussed relation of hernia to her symptoms. I do suspect that some of her symptoms are likely related to the hernias but I have discussed with her that some of the symptoms that she is having I do not think are necessarily related to the hernias and I cannot guarantee that her symptoms will completely resolve with surgical repair of the hernias. We did also discussed the potential complications of hernias including enlargement, perforation and/or strangulation. After discussion the patient would like to proceed with surgical repair. I have proposed a robotic assisted laparoscopic bilateral inguinal hernia repair with mesh. Risks of the procedure including bleeding, infection, scarring, pain, damage to surrounding structures including pelvic structures, need for additional surgery, hernia recurrence, mesh complications and anesthesia risk are discussed and consent is obtained. Testing to include EKG, CBC, CMP and chest x-ray. 2. Pre-op chest exam  -     EKG 12 lead; Future  -     XR CHEST STANDARD (2 VW); Future  -     CBC with Auto Differential; Future  -     Basic Metabolic Panel;  Future         (Please note that portions of this note were completed with a voice recognition program.  Efforts were made to edit the dictations but occasionally words are mis-transcribed.)

## 2022-09-28 NOTE — ASSESSMENT & PLAN NOTE
The patient does have bilateral fat-containing inguinal hernias on CT scan. I have discussed inguinal hernia with the patient and we also discussed relation of hernia to her symptoms. I do suspect that some of her symptoms are likely related to the hernias but I have discussed with her that some of the symptoms that she is having I do not think are necessarily related to the hernias and I cannot guarantee that her symptoms will completely resolve with surgical repair of the hernias. We did also discussed the potential complications of hernias including enlargement, perforation and/or strangulation. After discussion the patient would like to proceed with surgical repair. I have proposed a robotic assisted laparoscopic bilateral inguinal hernia repair with mesh. Risks of the procedure including bleeding, infection, scarring, pain, damage to surrounding structures including pelvic structures, need for additional surgery, hernia recurrence, mesh complications and anesthesia risk are discussed and consent is obtained. Testing to include EKG, CBC, CMP and chest x-ray.

## 2022-10-04 ENCOUNTER — HOSPITAL ENCOUNTER (OUTPATIENT)
Dept: LAB | Age: 57
Discharge: HOME | End: 2022-10-04
Payer: COMMERCIAL

## 2022-10-04 ENCOUNTER — HOSPITAL ENCOUNTER (OUTPATIENT)
Dept: GENERAL RADIOLOGY | Age: 57
Discharge: HOME | End: 2022-10-06
Payer: COMMERCIAL

## 2022-10-04 ENCOUNTER — HOSPITAL ENCOUNTER (OUTPATIENT)
Dept: NON INVASIVE DIAGNOSTICS | Age: 57
Discharge: HOME | End: 2022-10-04
Payer: COMMERCIAL

## 2022-10-04 DIAGNOSIS — Z01.811 PRE-OP CHEST EXAM: ICD-10-CM

## 2022-10-04 LAB
ABSOLUTE EOS #: 0.2 K/UL (ref 0–0.44)
ABSOLUTE IMMATURE GRANULOCYTE: <0.03 K/UL (ref 0–0.3)
ABSOLUTE LYMPH #: 2.03 K/UL (ref 1.1–3.7)
ABSOLUTE MONO #: 0.41 K/UL (ref 0.1–1.2)
ANION GAP SERPL CALCULATED.3IONS-SCNC: 9 MMOL/L (ref 9–17)
BASOPHILS # BLD: 0 % (ref 0–2)
BASOPHILS ABSOLUTE: <0.03 K/UL (ref 0–0.2)
BUN BLDV-MCNC: 17 MG/DL (ref 6–20)
BUN/CREAT BLD: 25 (ref 9–20)
CALCIUM SERPL-MCNC: 10.4 MG/DL (ref 8.6–10.4)
CHLORIDE BLD-SCNC: 103 MMOL/L (ref 98–107)
CO2: 31 MMOL/L (ref 20–31)
CREAT SERPL-MCNC: 0.69 MG/DL (ref 0.5–0.9)
EKG ATRIAL RATE: 70 BPM
EKG P AXIS: 60 DEGREES
EKG P-R INTERVAL: 142 MS
EKG Q-T INTERVAL: 404 MS
EKG QRS DURATION: 94 MS
EKG QTC CALCULATION (BAZETT): 436 MS
EKG R AXIS: 6 DEGREES
EKG T AXIS: 25 DEGREES
EKG VENTRICULAR RATE: 70 BPM
EOSINOPHILS RELATIVE PERCENT: 3 % (ref 1–4)
GFR SERPL CREATININE-BSD FRML MDRD: >60 ML/MIN/1.73M2
GLUCOSE BLD-MCNC: 94 MG/DL (ref 70–99)
HCT VFR BLD CALC: 43.7 % (ref 36.3–47.1)
HEMOGLOBIN: 15 G/DL (ref 11.9–15.1)
IMMATURE GRANULOCYTES: 0 %
LYMPHOCYTES # BLD: 34 % (ref 24–43)
MCH RBC QN AUTO: 31.4 PG (ref 25.2–33.5)
MCHC RBC AUTO-ENTMCNC: 34.3 G/DL (ref 25.2–33.5)
MCV RBC AUTO: 91.6 FL (ref 82.6–102.9)
MONOCYTES # BLD: 7 % (ref 3–12)
NRBC AUTOMATED: 0 PER 100 WBC
PDW BLD-RTO: 12.6 % (ref 11.8–14.4)
PLATELET # BLD: 204 K/UL (ref 138–453)
PMV BLD AUTO: 11.1 FL (ref 8.1–13.5)
POTASSIUM SERPL-SCNC: 4.1 MMOL/L (ref 3.7–5.3)
RBC # BLD: 4.77 M/UL (ref 3.95–5.11)
SEG NEUTROPHILS: 56 % (ref 36–65)
SEGMENTED NEUTROPHILS ABSOLUTE COUNT: 3.37 K/UL (ref 1.5–8.1)
SODIUM BLD-SCNC: 143 MMOL/L (ref 135–144)
WBC # BLD: 6 K/UL (ref 3.5–11.3)

## 2022-10-04 PROCEDURE — 80048 BASIC METABOLIC PNL TOTAL CA: CPT

## 2022-10-04 PROCEDURE — 85025 COMPLETE CBC W/AUTO DIFF WBC: CPT

## 2022-10-04 PROCEDURE — 71046 X-RAY EXAM CHEST 2 VIEWS: CPT

## 2022-10-04 PROCEDURE — 93005 ELECTROCARDIOGRAM TRACING: CPT

## 2022-10-04 PROCEDURE — 36415 COLL VENOUS BLD VENIPUNCTURE: CPT

## 2022-10-05 ENCOUNTER — TELEPHONE (OUTPATIENT)
Dept: SURGERY | Age: 57
End: 2022-10-05

## 2022-10-05 NOTE — TELEPHONE ENCOUNTER
[facility]    Pre-Operative Evaluation/Consultation    Name:  Moustapha Robles                                         Age:  62 y.o. MRN:  [id number]       :  1965   Date:  10/5/2022         Sex: female    There were no encounter diagnoses. Surgeon:  Dr. Bandar Juarez  Procedure (Planned):  LRA bilateral inguinal hernia repair  Date Scheduled surgery: 10/21/22    Attending : No att. providers found    Primary Physician: Colonel Chucho LY  Cardiologist: None    Type of Anesthesia Requested: General    Patient Medical history:  No Known Allergies  Patient Active Problem List   Diagnosis    Acid reflux    Hx of migraine headaches    COVID-19    Hypoxia    Pelvic pressure in female    Bladder pain    Non-recurrent bilateral inguinal hernia without obstruction or gangrene     Past Medical History:   Diagnosis Date    Acid reflux     Anemia     COVID-19     GERD (gastroesophageal reflux disease)     Hx of migraine headaches     Menorrhagia      Past Surgical History:   Procedure Laterality Date    BREAST SURGERY Right 2014    right breast biopsy negative ? Dr. Aroldo Keane      COLONOSCOPY  2010    UPPER GASTROINTESTINAL ENDOSCOPY  ,     neg    WISDOM TOOTH EXTRACTION       Social History     Tobacco Use    Smoking status: Never    Smokeless tobacco: Never   Vaping Use    Vaping Use: Never used   Substance Use Topics    Alcohol use:  Yes     Alcohol/week: 18.0 standard drinks     Types: 18 Cans of beer per week    Drug use: No     Medications:  Current Outpatient Medications   Medication Sig Dispense Refill    Cholecalciferol (VITAMIN D) 50 MCG (2000 UT) CAPS capsule Take by mouth      Multiple Vitamins-Minerals (THERAPEUTIC MULTIVITAMIN-MINERALS) tablet Take 1 tablet by mouth daily      albuterol sulfate  (90 Base) MCG/ACT inhaler Inhale 2 puffs into the lungs every 4 hours as needed for Wheezing or Shortness of Breath 18 g 3    Probiotic Product (PROBIOTIC DAILY PO) Take by mouth        No current facility-administered medications for this visit. Scheduled Meds:  Continuous Infusions:  PRN Meds:. Prior to Admission medications    Medication Sig Start Date End Date Taking? Authorizing Provider   Cholecalciferol (VITAMIN D) 50 MCG (2000 UT) CAPS capsule Take by mouth    Historical Provider, MD   Multiple Vitamins-Minerals (THERAPEUTIC MULTIVITAMIN-MINERALS) tablet Take 1 tablet by mouth daily    Historical Provider, MD   albuterol sulfate  (90 Base) MCG/ACT inhaler Inhale 2 puffs into the lungs every 4 hours as needed for Wheezing or Shortness of Breath 9/11/21 10/11/21  Debi Gonzales MD   Probiotic Product (PROBIOTIC DAILY PO) Take by mouth     Historical Provider, MD     Vital Signs (Current) [unfilled]    Weight:   Wt Readings from Last 1 Encounters:   09/27/22 165 lb (74.8 kg)     Height:   Ht Readings from Last 1 Encounters:   09/27/22 5' 4\" (1.626 m)      BMI:  There is no height or weight on file to calculate BMI. Estimated body mass index is 28.32 kg/m² as calculated from the following:    Height as of 9/27/22: 5' 4\" (1.626 m). Weight as of 9/27/22: 165 lb (74.8 kg). body mass index is unknown because there is no height or weight on file. Cardiac Clearance: None   Medical Clearance:None   Appointment for surgery Clearance scheduled for:None     Preoperative Testing:   These are the current and completed labs:  CBC:   Lab Results   Component Value Date/Time    WBC 6.0 10/04/2022 02:43 PM    RBC 4.77 10/04/2022 02:43 PM    HGB 15.0 10/04/2022 02:43 PM    HCT 43.7 10/04/2022 02:43 PM    MCV 91.6 10/04/2022 02:43 PM    RDW 12.6 10/04/2022 02:43 PM     10/04/2022 02:43 PM     CMP:   Lab Results   Component Value Date/Time     10/04/2022 02:43 PM    K 4.1 10/04/2022 02:43 PM     10/04/2022 02:43 PM    CO2 31 10/04/2022 02:43 PM    BUN 17 10/04/2022 02:43 PM    CREATININE 0.69 10/04/2022 02:43 PM    GFRAA >60 08/19/2022 02:18 PM    LABGLOM >60 10/04/2022 02:43 PM    GLUCOSE 94 10/04/2022 02:43 PM    PROT 6.8 08/19/2022 02:18 PM    CALCIUM 10.4 10/04/2022 02:43 PM    BILITOT 0.31 08/19/2022 02:18 PM    ALKPHOS 91 08/19/2022 02:18 PM    AST 18 08/19/2022 02:18 PM    ALT 18 08/19/2022 02:18 PM     POC Tests: No results for input(s): POCGLU, POCNA, POCK, POCCL, POCBUN, POCHEMO, POCHCT in the last 72 hours. Coags    Lab Results   Component Value Date/Time    PROTIME 10.3 05/01/2018 11:43 AM    INR 1.0 05/01/2018 11:43 AM    APTT 23.6 10/08/2014 08:53 AM     HCG (If Applicable) No results found for: PREGTESTUR, PREGSERUM, HCG, HCGQUANT   ABGs No results found for: PHART, PO2ART, VQQ2YSW, NPZ4NRW, BEART, N7OMZVYT   Type & Screen (If Applicable)  No results found for: Svetlana Hernandes    Additional ordered pre-operative testing:  [x]CBC    []ABG      [x] BMP   []URINALYSIS   []CMP    []HCG   []COAGS PT/INR  []T&C  []LFTs   []TYPE AND SCREEN    [x] EKG  [x] Chest X-Ray  [] Other Radiology    [] Sent to Hospitalist None  [] Sent to Anesthesia for your review:  CRNAs    [] Additional Orders: None     Comments:None   Requests: No Special requests    Signed:  Brandan Avila LPN 62/9/0744 7:42 AM

## 2022-10-21 ENCOUNTER — ANESTHESIA (OUTPATIENT)
Dept: OPERATING ROOM | Age: 57
End: 2022-10-21
Payer: COMMERCIAL

## 2022-10-21 ENCOUNTER — ANESTHESIA EVENT (OUTPATIENT)
Dept: OPERATING ROOM | Age: 57
End: 2022-10-21
Payer: COMMERCIAL

## 2022-10-21 ENCOUNTER — HOSPITAL ENCOUNTER (OUTPATIENT)
Age: 57
Setting detail: OUTPATIENT SURGERY
Discharge: HOME | End: 2022-10-21
Attending: SURGERY | Admitting: SURGERY
Payer: COMMERCIAL

## 2022-10-21 VITALS
RESPIRATION RATE: 18 BRPM | TEMPERATURE: 97.6 F | HEART RATE: 79 BPM | DIASTOLIC BLOOD PRESSURE: 61 MMHG | HEIGHT: 63 IN | OXYGEN SATURATION: 92 % | SYSTOLIC BLOOD PRESSURE: 129 MMHG | BODY MASS INDEX: 29.23 KG/M2 | WEIGHT: 165 LBS

## 2022-10-21 DIAGNOSIS — G89.18 POST-OP PAIN: Primary | ICD-10-CM

## 2022-10-21 DIAGNOSIS — K40.21 BILATERAL RECURRENT INGUINAL HERNIA WITHOUT OBSTRUCTION OR GANGRENE: ICD-10-CM

## 2022-10-21 PROCEDURE — 7100000000 HC PACU RECOVERY - FIRST 15 MIN: Performed by: SURGERY

## 2022-10-21 PROCEDURE — 2709999900 HC NON-CHARGEABLE SUPPLY: Performed by: SURGERY

## 2022-10-21 PROCEDURE — 3600000009 HC SURGERY ROBOT BASE: Performed by: SURGERY

## 2022-10-21 PROCEDURE — 6370000000 HC RX 637 (ALT 250 FOR IP): Performed by: SURGERY

## 2022-10-21 PROCEDURE — 3700000000 HC ANESTHESIA ATTENDED CARE: Performed by: SURGERY

## 2022-10-21 PROCEDURE — 2580000003 HC RX 258: Performed by: SURGERY

## 2022-10-21 PROCEDURE — S2900 ROBOTIC SURGICAL SYSTEM: HCPCS | Performed by: SURGERY

## 2022-10-21 PROCEDURE — 6360000002 HC RX W HCPCS: Performed by: NURSE ANESTHETIST, CERTIFIED REGISTERED

## 2022-10-21 PROCEDURE — 88304 TISSUE EXAM BY PATHOLOGIST: CPT

## 2022-10-21 PROCEDURE — 3700000001 HC ADD 15 MINUTES (ANESTHESIA): Performed by: SURGERY

## 2022-10-21 PROCEDURE — C1781 MESH (IMPLANTABLE): HCPCS | Performed by: SURGERY

## 2022-10-21 PROCEDURE — 7100000010 HC PHASE II RECOVERY - FIRST 15 MIN: Performed by: SURGERY

## 2022-10-21 PROCEDURE — 2500000003 HC RX 250 WO HCPCS: Performed by: NURSE ANESTHETIST, CERTIFIED REGISTERED

## 2022-10-21 PROCEDURE — 7100000001 HC PACU RECOVERY - ADDTL 15 MIN: Performed by: SURGERY

## 2022-10-21 PROCEDURE — 7100000011 HC PHASE II RECOVERY - ADDTL 15 MIN: Performed by: SURGERY

## 2022-10-21 PROCEDURE — 6360000002 HC RX W HCPCS: Performed by: SURGERY

## 2022-10-21 PROCEDURE — 3600000019 HC SURGERY ROBOT ADDTL 15MIN: Performed by: SURGERY

## 2022-10-21 PROCEDURE — 49650 LAP ING HERNIA REPAIR INIT: CPT | Performed by: SURGERY

## 2022-10-21 DEVICE — MESH HERN W10XL15CM POLY POLYLACTIC ACID 70% CLLGN 30% GLYC: Type: IMPLANTABLE DEVICE | Site: GROIN | Status: FUNCTIONAL

## 2022-10-21 RX ORDER — MORPHINE SULFATE 2 MG/ML
2 INJECTION, SOLUTION INTRAMUSCULAR; INTRAVENOUS EVERY 5 MIN PRN
Status: DISCONTINUED | OUTPATIENT
Start: 2022-10-21 | End: 2022-10-21 | Stop reason: HOSPADM

## 2022-10-21 RX ORDER — DEXAMETHASONE SODIUM PHOSPHATE 10 MG/ML
INJECTION INTRAMUSCULAR; INTRAVENOUS PRN
Status: DISCONTINUED | OUTPATIENT
Start: 2022-10-21 | End: 2022-10-21 | Stop reason: SDUPTHER

## 2022-10-21 RX ORDER — MORPHINE SULFATE 2 MG/ML
1 INJECTION, SOLUTION INTRAMUSCULAR; INTRAVENOUS EVERY 5 MIN PRN
Status: DISCONTINUED | OUTPATIENT
Start: 2022-10-21 | End: 2022-10-21 | Stop reason: HOSPADM

## 2022-10-21 RX ORDER — PROPOFOL 10 MG/ML
INJECTION, EMULSION INTRAVENOUS PRN
Status: DISCONTINUED | OUTPATIENT
Start: 2022-10-21 | End: 2022-10-21 | Stop reason: SDUPTHER

## 2022-10-21 RX ORDER — MIDAZOLAM HYDROCHLORIDE 1 MG/ML
INJECTION INTRAMUSCULAR; INTRAVENOUS PRN
Status: DISCONTINUED | OUTPATIENT
Start: 2022-10-21 | End: 2022-10-21 | Stop reason: SDUPTHER

## 2022-10-21 RX ORDER — SODIUM CHLORIDE 0.9 % (FLUSH) 0.9 %
5-40 SYRINGE (ML) INJECTION PRN
Status: DISCONTINUED | OUTPATIENT
Start: 2022-10-21 | End: 2022-10-21 | Stop reason: HOSPADM

## 2022-10-21 RX ORDER — SODIUM CHLORIDE 0.9 % (FLUSH) 0.9 %
5-40 SYRINGE (ML) INJECTION EVERY 12 HOURS SCHEDULED
Status: DISCONTINUED | OUTPATIENT
Start: 2022-10-21 | End: 2022-10-21 | Stop reason: HOSPADM

## 2022-10-21 RX ORDER — FENTANYL CITRATE 50 UG/ML
INJECTION, SOLUTION INTRAMUSCULAR; INTRAVENOUS PRN
Status: DISCONTINUED | OUTPATIENT
Start: 2022-10-21 | End: 2022-10-21 | Stop reason: SDUPTHER

## 2022-10-21 RX ORDER — KETOROLAC TROMETHAMINE 30 MG/ML
INJECTION, SOLUTION INTRAMUSCULAR; INTRAVENOUS PRN
Status: DISCONTINUED | OUTPATIENT
Start: 2022-10-21 | End: 2022-10-21 | Stop reason: SDUPTHER

## 2022-10-21 RX ORDER — SODIUM CHLORIDE 9 MG/ML
INJECTION, SOLUTION INTRAVENOUS PRN
Status: DISCONTINUED | OUTPATIENT
Start: 2022-10-21 | End: 2022-10-21 | Stop reason: HOSPADM

## 2022-10-21 RX ORDER — ROCURONIUM BROMIDE 10 MG/ML
INJECTION, SOLUTION INTRAVENOUS PRN
Status: DISCONTINUED | OUTPATIENT
Start: 2022-10-21 | End: 2022-10-21 | Stop reason: SDUPTHER

## 2022-10-21 RX ORDER — ONDANSETRON 4 MG/1
4 TABLET, ORALLY DISINTEGRATING ORAL ONCE
Status: COMPLETED | OUTPATIENT
Start: 2022-10-21 | End: 2022-10-21

## 2022-10-21 RX ORDER — ONDANSETRON 2 MG/ML
INJECTION INTRAMUSCULAR; INTRAVENOUS PRN
Status: DISCONTINUED | OUTPATIENT
Start: 2022-10-21 | End: 2022-10-21 | Stop reason: SDUPTHER

## 2022-10-21 RX ORDER — OXYCODONE HYDROCHLORIDE 5 MG/1
5 TABLET ORAL PRN
Status: COMPLETED | OUTPATIENT
Start: 2022-10-21 | End: 2022-10-21

## 2022-10-21 RX ORDER — SODIUM CHLORIDE, SODIUM LACTATE, POTASSIUM CHLORIDE, CALCIUM CHLORIDE 600; 310; 30; 20 MG/100ML; MG/100ML; MG/100ML; MG/100ML
INJECTION, SOLUTION INTRAVENOUS CONTINUOUS
Status: DISCONTINUED | OUTPATIENT
Start: 2022-10-21 | End: 2022-10-21 | Stop reason: HOSPADM

## 2022-10-21 RX ORDER — HYDROCODONE BITARTRATE AND ACETAMINOPHEN 5; 325 MG/1; MG/1
1 TABLET ORAL EVERY 6 HOURS PRN
Qty: 20 TABLET | Refills: 0 | Status: SHIPPED | OUTPATIENT
Start: 2022-10-21 | End: 2022-10-26

## 2022-10-21 RX ORDER — LIDOCAINE HYDROCHLORIDE 20 MG/ML
INJECTION, SOLUTION EPIDURAL; INFILTRATION; INTRACAUDAL; PERINEURAL PRN
Status: DISCONTINUED | OUTPATIENT
Start: 2022-10-21 | End: 2022-10-21 | Stop reason: SDUPTHER

## 2022-10-21 RX ORDER — OXYCODONE HYDROCHLORIDE 5 MG/1
10 TABLET ORAL PRN
Status: COMPLETED | OUTPATIENT
Start: 2022-10-21 | End: 2022-10-21

## 2022-10-21 RX ORDER — LABETALOL HYDROCHLORIDE 5 MG/ML
INJECTION, SOLUTION INTRAVENOUS PRN
Status: DISCONTINUED | OUTPATIENT
Start: 2022-10-21 | End: 2022-10-21 | Stop reason: SDUPTHER

## 2022-10-21 RX ORDER — ONDANSETRON 2 MG/ML
4 INJECTION INTRAMUSCULAR; INTRAVENOUS
Status: DISCONTINUED | OUTPATIENT
Start: 2022-10-21 | End: 2022-10-21 | Stop reason: HOSPADM

## 2022-10-21 RX ADMIN — PHENYLEPHRINE HYDROCHLORIDE 300 MCG: 10 INJECTION INTRAVENOUS at 12:11

## 2022-10-21 RX ADMIN — OXYCODONE HYDROCHLORIDE 5 MG: 5 TABLET ORAL at 14:04

## 2022-10-21 RX ADMIN — ONDANSETRON 4 MG: 2 INJECTION INTRAMUSCULAR; INTRAVENOUS at 11:00

## 2022-10-21 RX ADMIN — FENTANYL CITRATE 100 MCG: 50 INJECTION, SOLUTION INTRAMUSCULAR; INTRAVENOUS at 10:48

## 2022-10-21 RX ADMIN — LIDOCAINE HYDROCHLORIDE 100 MG: 20 INJECTION, SOLUTION EPIDURAL; INFILTRATION; INTRACAUDAL; PERINEURAL at 10:55

## 2022-10-21 RX ADMIN — PROPOFOL 50 MG: 10 INJECTION, EMULSION INTRAVENOUS at 11:15

## 2022-10-21 RX ADMIN — PROPOFOL 150 MG: 10 INJECTION, EMULSION INTRAVENOUS at 10:55

## 2022-10-21 RX ADMIN — LABETALOL HYDROCHLORIDE 10 MG: 5 INJECTION INTRAVENOUS at 11:15

## 2022-10-21 RX ADMIN — MIDAZOLAM HYDROCHLORIDE 2 MG: 1 INJECTION, SOLUTION INTRAMUSCULAR; INTRAVENOUS at 10:48

## 2022-10-21 RX ADMIN — DEXAMETHASONE SODIUM PHOSPHATE 10 MG: 10 INJECTION INTRAMUSCULAR; INTRAVENOUS at 11:03

## 2022-10-21 RX ADMIN — SODIUM CHLORIDE, POTASSIUM CHLORIDE, SODIUM LACTATE AND CALCIUM CHLORIDE: 600; 310; 30; 20 INJECTION, SOLUTION INTRAVENOUS at 11:15

## 2022-10-21 RX ADMIN — SODIUM CHLORIDE, POTASSIUM CHLORIDE, SODIUM LACTATE AND CALCIUM CHLORIDE: 600; 310; 30; 20 INJECTION, SOLUTION INTRAVENOUS at 09:39

## 2022-10-21 RX ADMIN — SUGAMMADEX 100 MG: 100 INJECTION, SOLUTION INTRAVENOUS at 12:59

## 2022-10-21 RX ADMIN — Medication 1 MG: at 12:51

## 2022-10-21 RX ADMIN — ONDANSETRON 4 MG: 4 TABLET, ORALLY DISINTEGRATING ORAL at 14:57

## 2022-10-21 RX ADMIN — ROCURONIUM BROMIDE 50 MG: 10 INJECTION, SOLUTION INTRAVENOUS at 10:55

## 2022-10-21 RX ADMIN — KETOROLAC TROMETHAMINE 30 MG: 30 INJECTION, SOLUTION INTRAMUSCULAR at 12:52

## 2022-10-21 RX ADMIN — Medication 2000 MG: at 11:00

## 2022-10-21 ASSESSMENT — PAIN SCALES - GENERAL
PAINLEVEL_OUTOF10: 5
PAINLEVEL_OUTOF10: 0
PAINLEVEL_OUTOF10: 5

## 2022-10-21 ASSESSMENT — PAIN DESCRIPTION - LOCATION
LOCATION: ABDOMEN
LOCATION: ABDOMEN

## 2022-10-21 ASSESSMENT — PAIN DESCRIPTION - PAIN TYPE
TYPE: SURGICAL PAIN
TYPE: SURGICAL PAIN

## 2022-10-21 ASSESSMENT — PAIN DESCRIPTION - DESCRIPTORS
DESCRIPTORS: ACHING
DESCRIPTORS: ACHING

## 2022-10-21 ASSESSMENT — PAIN - FUNCTIONAL ASSESSMENT: PAIN_FUNCTIONAL_ASSESSMENT: 0-10

## 2022-10-21 NOTE — H&P
Ericka Lopez is a 62 y.o. female who presents today for further evaluation of bilateral inguinal hernias. The patient is referred from her gynecologist.  On discussion she states that for the past several months she has been having pain that seems to have started kind of in the low back but more recently has begun to have pressure type pain in her pelvis. Does seem that this is exacerbated with physical activity and she states that often times as she is going about her day doing walking or other physical activities that she will notice that the pain worsens. Typically gets better with rest.  Because of her symptoms she initially thought that this may be related to pelvic organs and was seen by gynecology. She did have pelvic ultrasound as well as a CT scan of the abdomen pelvis ordered. There was no acute findings but the CT did demonstrate a small incidental hiatal hernia as well as bilateral fat-containing inguinal hernias. As there is been no other clear diagnosis to suggest the cause of her pelvic symptoms it was felt that this may be related to her hernias. On discussion patient denies any specific inciting activity to start the pain. However she does report that she stays very active and does a lot of weight training as well as a lot of cardiovascular training. She has started to cut back on the weight training due to worsening of symptoms when she is doing weight lifting. Denies any changes in bowel or bladder habits. No nausea or emesis. Has not noticed any noticeable bulges in the groin. Past Medical History        Past Medical History:   Diagnosis Date    Acid reflux      Anemia      COVID-19      GERD (gastroesophageal reflux disease)      Hx of migraine headaches      Menorrhagia              Past Surgical History         Past Surgical History:   Procedure Laterality Date    BREAST SURGERY Right 01/01/2014     right breast biopsy negative ? Dr. Alyssa Tran SECTION        COLONOSCOPY   12/28/2010    UPPER GASTROINTESTINAL ENDOSCOPY   2005, 2010     neg    WISDOM TOOTH EXTRACTION                Current Facility-Administered Medications          Current Outpatient Medications   Medication Sig Dispense Refill    Cholecalciferol (VITAMIN D) 50 MCG (2000 UT) CAPS capsule Take by mouth        Multiple Vitamins-Minerals (THERAPEUTIC MULTIVITAMIN-MINERALS) tablet Take 1 tablet by mouth daily        ciprofloxacin (CIPRO) 500 MG tablet Take 1 tablet by mouth 2 times daily for 7 days 14 tablet 0    Probiotic Product (PROBIOTIC DAILY PO) Take by mouth         albuterol sulfate  (90 Base) MCG/ACT inhaler Inhale 2 puffs into the lungs every 4 hours as needed for Wheezing or Shortness of Breath 18 g 3      No current facility-administered medications for this visit. No Known Allergies     Family History         Family History   Problem Relation Age of Onset    Other Maternal Grandfather           dementia  bio grandmother    Heart Disease Maternal Grandfather      Hypertension Father      Elevated Lipids Father      Other Father           pulmonary fibrosis    Lung Cancer Father      High Blood Pressure Father      Cancer Brother           kidney    Arthritis Mother           one knee replaced    High Cholesterol Mother           manages mostly with diet    Miscarriages / Stillbirths Mother           stillbirth at 42 weeks    Heart Disease Mother      High Blood Pressure Mother      Cancer Mother           skin CA    Asthma Brother      Other Brother           bicuspid aortic valve    Heart Disease Paternal Grandfather 80    Heart Disease Paternal Grandmother      Other Son           lymphomatoid papullosis seen at [facility].      Other Daughter           lymphatoid papulosus            Social History               Socioeconomic History    Marital status:        Spouse name: Not on file    Number of children: Not on file    Years of education: Not on file    Highest education level: Not on file   Occupational History    Not on file   Tobacco Use    Smoking status: Never    Smokeless tobacco: Never   Vaping Use    Vaping Use: Never used   Substance and Sexual Activity    Alcohol use: Yes       Alcohol/week: 18.0 standard drinks       Types: 18 Cans of beer per week    Drug use: No    Sexual activity: Yes       Partners: Male   Other Topics Concern    Not on file   Social History Narrative    Not on file      Social Determinants of Health          Financial Resource Strain: Low Risk     Difficulty of Paying Living Expenses: Not hard at all   Food Insecurity: No Food Insecurity    Worried About [facility] in the Last Year: Never true    [facility] in the Last Year: Never true   Transportation Needs: No Transportation Needs    Lack of Transportation (Medical): No    Lack of Transportation (Non-Medical): No   Physical Activity: Not on file   Stress: Not on file   Social Connections: Not on file   Intimate Partner Violence: Not on file   Housing Stability: Not on file            ROS:   Review of Systems - General ROS: negative  Psychological ROS: negative  Ophthalmic ROS: negative  ENT ROS: negative  Respiratory ROS: no cough, shortness of breath, or wheezing  Cardiovascular ROS: no chest pain or dyspnea on exertion  Gastrointestinal ROS: per HPI  Genito-Urinary ROS: no dysuria, trouble voiding, or hematuria  Musculoskeletal ROS: negative  Dermatological ROS: negative        Objective       Vitals:     09/27/22 1533   BP: 118/64   Pulse: 68   SpO2: 98%      General:in no apparent distress and well developed and well nourished  Eyes: No gross abnormalities. Ears, Nose, Throat: hearing grossly normal bilaterally  Neck: neck supple and non tender without mass  Lungs: clear to auscultation without wheezes or rales   Heart: S1S2, no mumurs, RRR  Abdomen: Soft, nondistended, nontender to palpation, bowel sounds present. No noticeable bulge in groin.   There is some laxity of the muscle with Valsalva but I do not appreciate any significant herniation of tissue. Extremity: negative  Neuro: CN II-XII grossly intact        1. Non-recurrent bilateral inguinal hernia without obstruction or gangrene  Assessment & Plan:  The patient does have bilateral fat-containing inguinal hernias on CT scan. I have discussed inguinal hernia with the patient and we also discussed relation of hernia to her symptoms. I do suspect that some of her symptoms are likely related to the hernias but I have discussed with her that some of the symptoms that she is having I do not think are necessarily related to the hernias and I cannot guarantee that her symptoms will completely resolve with surgical repair of the hernias. We did also discussed the potential complications of hernias including enlargement, perforation and/or strangulation. After discussion the patient would like to proceed with surgical repair. I have proposed a robotic assisted laparoscopic bilateral inguinal hernia repair with mesh. Risks of the procedure including bleeding, infection, scarring, pain, damage to surrounding structures including pelvic structures, need for additional surgery, hernia recurrence, mesh complications and anesthesia risk are discussed and consent is obtained. Testing to include EKG, CBC, CMP and chest x-ray. 2. Pre-op chest exam  -     EKG 12 lead; Future  -     XR CHEST STANDARD (2 VW); Future  -     CBC with Auto Differential; Future  -     Basic Metabolic Panel;  Future           (Please note that portions of this note were completed with a voice recognition program.  Efforts were made to edit the dictations but occasionally words are mis-transcribed.)    The patient was interviewed and examined and there have been no changes since the above History and Physical.    Electronically signed by Constance Yu DO on 10/20/2022 at 10:15 PM

## 2022-10-21 NOTE — ANESTHESIA PRE PROCEDURE
Department of Anesthesiology  Preprocedure Note       Name:  Kimo Brunson   Age:  62 y.o.  :  1965                                          MRN:  [id number]         Date:  10/21/2022      Surgeon: Adelso Pagan):  Cheyanne Chairez DO    Procedure: Procedure(s):  Laparoscopic Robotic Assisted Bilateral Inguinal Hernia Repair with mesh    Medications prior to admission:   Prior to Admission medications    Medication Sig Start Date End Date Taking? Authorizing Provider   HYDROcodone-acetaminophen (NORCO) 5-325 MG per tablet Take 1 tablet by mouth every 6 hours as needed for Pain for up to 5 days. Intended supply: 5 days.  Take lowest dose possible to manage pain 10/21/22 10/26/22 Yes Cheyanne Chairez DO   Cholecalciferol (VITAMIN D) 50 MCG ( UT) CAPS capsule Take by mouth    Historical Provider, MD   Multiple Vitamins-Minerals (THERAPEUTIC MULTIVITAMIN-MINERALS) tablet Take 1 tablet by mouth daily    Historical Provider, MD   Probiotic Product (PROBIOTIC DAILY PO) Take by mouth     Historical Provider, MD       Current medications:    Current Facility-Administered Medications   Medication Dose Route Frequency Provider Last Rate Last Admin    lactated ringers infusion   IntraVENous Continuous Cheyanne Sago,  mL/hr at 10/21/22 0939 New Bag at 10/21/22 0939    sodium chloride flush 0.9 % injection 5-40 mL  5-40 mL IntraVENous 2 times per day Cheyanne Sago, DO        sodium chloride flush 0.9 % injection 5-40 mL  5-40 mL IntraVENous PRN Cheyanne Sago, DO        0.9 % sodium chloride infusion   IntraVENous PRN Cheyanne Sagronenll, DO        ceFAZolin (ANCEF) 2000 mg in sterile water 20 mL IV syringe  2,000 mg IntraVENous On Call to [address] Bryanna Solis DO           Allergies:  No Known Allergies    Problem List:    Patient Active Problem List   Diagnosis Code    Acid reflux K21.9    Hx of migraine headaches Z86.69    COVID-19 U07.1    Hypoxia R09.02    Pelvic pressure in female R10.2    Bladder pain R39.89    Non-recurrent bilateral inguinal hernia without obstruction or gangrene K40.20       Past Medical History:        Diagnosis Date    Acid reflux     Anemia     COVID-19     GERD (gastroesophageal reflux disease)     Hx of migraine headaches     Menorrhagia        Past Surgical History:        Procedure Laterality Date    BREAST SURGERY Right 01/01/2014    right breast biopsy negative ? Dr. Farideh Salmon COLONOSCOPY  12/28/2010    UPPER GASTROINTESTINAL ENDOSCOPY  2005, 2010    neg    WISDOM TOOTH EXTRACTION         Social History:    Social History     Tobacco Use    Smoking status: Never    Smokeless tobacco: Never   Substance Use Topics    Alcohol use: Yes     Alcohol/week: 18.0 standard drinks     Types: 18 Cans of beer per week                                Counseling given: Not Answered      Vital Signs (Current):   Vitals:    10/21/22 0909   BP: (!) 146/84   Pulse: 73   Resp: 16   Temp: 36.8 °C (98.3 °F)   TempSrc: Temporal   SpO2: 97%   Weight: 165 lb (74.8 kg)   Height: 5' 3\" (1.6 m)                                              BP Readings from Last 3 Encounters:   10/21/22 (!) 146/84   09/27/22 118/64   09/22/22 112/80       NPO Status: Time of last liquid consumption: 2000                        Time of last solid consumption: 2000                        Date of last liquid consumption: 10/20/22                        Date of last solid food consumption: 10/20/22    BMI:   Wt Readings from Last 3 Encounters:   10/21/22 165 lb (74.8 kg)   09/27/22 165 lb (74.8 kg)   09/22/22 165 lb 8 oz (75.1 kg)     Body mass index is 29.23 kg/m².     CBC:   Lab Results   Component Value Date/Time    WBC 6.0 10/04/2022 02:43 PM    RBC 4.77 10/04/2022 02:43 PM    HGB 15.0 10/04/2022 02:43 PM    HCT 43.7 10/04/2022 02:43 PM    MCV 91.6 10/04/2022 02:43 PM    RDW 12.6 10/04/2022 02:43 PM     10/04/2022 02:43 PM       CMP:   Lab Results   Component Value Date/Time  10/04/2022 02:43 PM    K 4.1 10/04/2022 02:43 PM     10/04/2022 02:43 PM    CO2 31 10/04/2022 02:43 PM    BUN 17 10/04/2022 02:43 PM    CREATININE 0.69 10/04/2022 02:43 PM    GFRAA >60 08/19/2022 02:18 PM    LABGLOM >60 10/04/2022 02:43 PM    GLUCOSE 94 10/04/2022 02:43 PM    PROT 6.8 08/19/2022 02:18 PM    CALCIUM 10.4 10/04/2022 02:43 PM    BILITOT 0.31 08/19/2022 02:18 PM    ALKPHOS 91 08/19/2022 02:18 PM    AST 18 08/19/2022 02:18 PM    ALT 18 08/19/2022 02:18 PM       POC Tests: No results for input(s): POCGLU, POCNA, POCK, POCCL, POCBUN, POCHEMO, POCHCT in the last 72 hours. Coags:   Lab Results   Component Value Date/Time    PROTIME 10.3 05/01/2018 11:43 AM    INR 1.0 05/01/2018 11:43 AM    APTT 23.6 10/08/2014 08:53 AM       HCG (If Applicable): No results found for: PREGTESTUR, PREGSERUM, HCG, HCGQUANT     ABGs: No results found for: PHART, PO2ART, CZX5UYM, WPP4WBS, BEART, Q8ELRQWF     Type & Screen (If Applicable):  No results found for: LABABO, LABRH    Drug/Infectious Status (If Applicable):  No results found for: HIV, HEPCAB    COVID-19 Screening (If Applicable):   Lab Results   Component Value Date/Time    COVID19 DETECTED 09/02/2021 01:12 AM           Anesthesia Evaluation  Patient summary reviewed and Nursing notes reviewed no history of anesthetic complications:   Airway: Mallampati: II  TM distance: >3 FB   Neck ROM: full  Mouth opening: > = 3 FB   Dental: normal exam         Pulmonary:Negative Pulmonary ROS breath sounds clear to auscultation                             Cardiovascular:  Exercise tolerance: good (>4 METS),       (-)  angina      Rhythm: regular  Rate: normal           Beta Blocker:  Not on Beta Blocker         Neuro/Psych:   Negative Neuro/Psych ROS              GI/Hepatic/Renal:   (+) GERD:,           Endo/Other: Negative Endo/Other ROS                    Abdominal:             Vascular: negative vascular ROS.          Other Findings:           Anesthesia Plan      general     ASA 2       Induction: intravenous. MIPS: Postoperative opioids intended and Prophylactic antiemetics administered. Anesthetic plan and risks discussed with patient.       Plan discussed with surgical team.                    TINO Cosby - LUISANA   10/21/2022

## 2022-10-21 NOTE — ANESTHESIA POSTPROCEDURE EVALUATION
Department of Anesthesiology  Postprocedure Note    Patient: Cris Avalos  MRN: [id number]  [place]: 1965  Date of evaluation: 10/21/2022      Procedure Summary     Date: 10/21/22 Room / Location: [address] [place] [address]    Anesthesia Start: 1048 Anesthesia Stop: 1316    Procedure: Laparoscopic Robotic Assisted Bilateral Inguinal Hernia Repair with mesh (Bilateral) Diagnosis:       Bilateral recurrent inguinal hernia without obstruction or gangrene      (Bilateral recurrent inguinal hernia without obstruction or gangrene [K40.21])    Surgeons: Nora Sigala DO Responsible Provider: TINO Salvador CRNA    Anesthesia Type: general ASA Status: 2          Anesthesia Type: No value filed.     Candida Phase I: Candida Score: 10    Candida Phase II:        Anesthesia Post Evaluation    Patient location during evaluation: PACU  Patient participation: complete - patient participated  Level of consciousness: awake and alert  Pain score: 0  Airway patency: patent  Nausea & Vomiting: no nausea and no vomiting  Complications: no  Cardiovascular status: blood pressure returned to baseline and hemodynamically stable  Respiratory status: acceptable, spontaneous ventilation and room air  Hydration status: euvolemic

## 2022-10-21 NOTE — DISCHARGE INSTRUCTIONS
Patient Discharge Instructions  Discharge Date:  10/21/22       Discharged To: Home    Home with Home Health Care: No    RESUME ACTIVITY:      BATHING: Ok to shower starting the day after surgery. No tub baths or submerging in water until after follow up in office. DRIVING: No driving for 1week  or while taking narcotic pain medications    RETURN TO WORK: [id number] Maxine Kiran to return as tolerated 1 week following surgery with the following restrictions:  No lifting more than 10 pounds  The above restrictions are in effect for 6 week(s)    WALKING:  Yes    STAIRS:  Yes    LIFTING: Less than 10 pounds for 6weeks     DIET: common adult    SPECIAL INSTRUCTIONS:     Call the office at [phone] if you have a fever > 100 F, or if your incision becomes red, tender, or drains more than a small amount of clear fluid. Call for follow up appointment with Dr. Keyana Llamas in:  1-2weeks    May use ibuprofen, if able, for additional pain control. Use up to 400mg every 6 hours as needed. Ok to use ice packs to incisions for comfort. Use 15 minutes on, 30 minutes off and repeat as desired.

## 2022-10-21 NOTE — OP NOTE
Jane [address]                 [address] [place] [address]                                OPERATIVE REPORT    PATIENT NAME: Henry Coppola                     :        1965  MED REC NO:   [id number]                             ROOM:  ACCOUNT NO:   [de-identified]                           ADMIT DATE: 10/21/2022  PROVIDER:     Greyson Blanchard    DATE OF PROCEDURE:  10/21/2022    SURGEON:  Dr. Greyson Blanchard. ASSISTANT:  None. PREOPERATIVE DIAGNOSIS:  Bilateral inguinal hernias. POSTOPERATIVE DIAGNOSIS:  Bilateral inguinal hernias. PROCEDURE:  Robotic-assisted bilateral inguinal hernia repairs with  mesh. ANESTHESIA:  General.    ESTIMATED BLOOD LOSS:  15 mL. FLUIDS:  1700 mL of crystalloid. COMPLICATIONS:  None. SPECIMENS:  Bilateral inguinal canal lipomas. INDICATION FOR PROCEDURE:  The patient is a 26-year-old female who is  referred to my office for lower abdominal and inguinal pain. She had a  CT prior to seeing me that did show bilateral fat containing inguinal  hernias. Because of her symptoms and the CT findings, we did discuss  management of inguinal hernias and she did wish to proceed with surgical  repair. Prior to the time of the procedure, risks, benefits, and  alternatives were explained to the patient and consent was obtained. DESCRIPTION OF PROCEDURE:  The patient was brought to the operative  suite and placed on the operative table in the supine position. Monitoring devices and SCD cuffs were placed. General endotracheal  anesthesia was induced. After induction of anesthesia, time-out was  performed and correct patient and procedure were verified. The  patient's abdomen was prepped and draped in the sterile fashion. Prior  to the time of incision, the patient received preoperative antibiotics  in accordance with SCIP protocol.   The skin in the left upper quadrant  of the abdomen was anesthetized with local anesthetic approximately 2 to  3 cm below the costal margin at midclavicular line. Small transverse  incision was made through this area. Veress needle was advanced into  the abdomen. Saline drop test showed no aspiration of blood or enteric  fluid and free flow of saline. Insufflation tubing was connected and  the patient's abdomen was insufflated to 12 mmHg. Once done, the Veress  needle was removed and an 8 mm robotic trocar with laparoscope in place  was advanced into the abdomen in an Optiview fashion. Once in,  inspection of underlying structures showed no damage during entrance. At this point, I did inspect the lower abdomen and there was no  immediately evident hernias on visualization. However, based on the CT  findings, I did suspect that she probably had preperitoneal fat in the  defect. Under visualization with the scope, two additional robotic  trocars were placed in the upper abdomen in the midline and in the right  upper quadrant. The robot was then docked and targeted to the lower  abdomen. Working instruments of Force bipolar and hot scissors were  placed. I began on the right side and had the bedside assist palpate  the ASIS. I opened the peritoneum at the level of the ASIS from lateral  to medial.  Once into the preperitoneal space, I began to dissect down  the peritoneum away from the abdominal wall. I started medially and  continued my dissection down until I exposed Rony's ligament and pubic  bone and made sure that I dissected a couple of centimeters below  Rony's ligament. I then continued my dissection laterally taking down  the peritoneum. Once I had done this, I did note that the patient had a  fairly large cord lipoma and I began to reduce the cord lipoma using  combination of blunt dissection as well as some sharp dissection with  the scissors.   Once the cord lipoma was reduced, it was fairly large, so  I went ahead and dissected it back further and then just because of its  size and I fear that it may try to lift the mesh. Once it was in place,  I tried to sneak under the mesh, I went ahead and just resected the  lipoma. This was placed into the abdominal cavity so it could be  retrieved later. At this point, I continued my dissection of the  peritoneum posterior to make sure that we had plenty of space for mesh  placement. The laparoscopic ProGrip mesh was then placed in the abdomen  along with a 3-0 PDS absorbable suture. The mesh was placed into the  preperitoneal space and unfolded so that I had mesh placement all the  way to midline making sure that we were a couple centimeters below the  Rony's ligament medially and then unfolded this to completely cover  the myopectineal orifice. Once this was done, the mesh was in good  placement with no folding or crimping. I did lift the peritoneum at  this point to make sure that once I closed the peritoneum we were not  going to have any folding of the mesh and the mesh was certainly laying  on the tissue with no overlap on the peritoneum. I then closed the  peritoneal window using a 3-0 absorbable V-Loc in a running fashion. Once it was closed, the excess suture and needle material was removed. I had also trimmed a small amount of mesh from the superolateral side  and this was also removed from the abdomen. With the right side  completed, attention was turned to left side. I again created a new  window in the peritoneum at the level of the ASIS and began to dissect  down the peritoneum from the abdominal wall structures. I started my  dissection medially and dissected all the way down until I got to the  Rony's ligament and then dissected below this a few centimeters making  sure that we had reached midline and gotten a couple centimeters below  the pubic bone and Rony's ligament. I then continued my dissection  laterally taking down the peritoneum and again encountered a fairly  large cord lipoma.   The lipoma was again reduced and dissected down. I  went ahead and resected this lipoma as well for the same reasons. I  continued my dissection to make sure that we had a good dissection for  mesh placement. I had the bedside assist go ahead and trim the mesh on  the superolateral side to round it off and then had it placed in the  abdomen with a second 3-0 absorbable V-Loc suture. Mesh was placed in  the preperitoneal space and unfolded so that we had good overlap of the  midline as well as making sure that we had the mesh well below the level  of the Rony's ligament. It was unfolded to completely occlude the  myopectineal orifice. Once this was done, again I elevated the  peritoneal flap to make sure that there was not going to be any folding  or clamshelling of the mesh. We did have plenty of dissection to ensure  that this did not happen. The peritoneal window on this side was then  closed in a running fashion with a 3-0 absorbable V-Loc. Once it was  closed, I had the bedside assist remove the needle and excess suture  material.  During the case, I had placed the laparoscopic sponge into  the abdomen and this was also removed. Final inspection in the abdomen  showed good hemostasis with no obvious injuries to other structures. There was no rents or defects in the peritoneum. The needle  was  then removed. An EndoCatch bag was brought into the abdomen and the  resected lipomas were placed in the bag which was then closed. At this  point, the additional instrument was removed and robot was undocked. I  scrubbed back into the case and under visualization with the scope  removed the EndoCatch bag with lipomas through the right upper quadrant  port site incision. Once this was out, the camera was removed and the  additional trocars were uncapped to allow evacuation of CO2 from the  abdomen and then these were also removed.   All port sites were then  anesthetized with local anesthetic and closed at skin level with 4-0  Monocryl in a subcuticular fashion. The patient's abdomen was cleansed  and dried and skin glue was placed across all incisions. At the  conclusion of the procedure, all sponge, instrument, and needle counts  were correct. The patient was awoken from anesthesia and taken to the  PACU in stable condition.         Sheryle Sly    D: 10/21/2022 13:17:29       T: 10/21/2022 13:22:20     [email or web address]  Job#: [id number]     Doc#: [id number]    CC:  Primary Care

## 2022-10-24 PROBLEM — K40.90 RIGHT INGUINAL HERNIA: Status: ACTIVE | Noted: 2022-10-24

## 2022-10-24 PROBLEM — K40.90 LEFT INGUINAL HERNIA: Status: ACTIVE | Noted: 2022-10-24

## 2022-10-25 LAB — SURGICAL PATHOLOGY REPORT: NORMAL

## 2022-11-01 ENCOUNTER — TELEPHONE (OUTPATIENT)
Dept: SURGERY | Age: 57
End: 2022-11-01

## 2022-11-01 ENCOUNTER — OFFICE VISIT (OUTPATIENT)
Dept: SURGERY | Age: 57
End: 2022-11-01

## 2022-11-01 VITALS
HEIGHT: 63 IN | HEART RATE: 76 BPM | WEIGHT: 166 LBS | BODY MASS INDEX: 29.41 KG/M2 | DIASTOLIC BLOOD PRESSURE: 82 MMHG | SYSTOLIC BLOOD PRESSURE: 126 MMHG

## 2022-11-01 DIAGNOSIS — Z09 POSTOP CHECK: Primary | ICD-10-CM

## 2022-11-01 PROCEDURE — 99024 POSTOP FOLLOW-UP VISIT: CPT | Performed by: SURGERY

## 2022-11-01 NOTE — LETTER
[facility] Gen Surg A department of [facility]  Phone: [phone]  Fax: [address], OP        November 2, 2022     Patient: Abdulaziz Prieto   YOB: 1965   Date of Visit: 11/1/2022       To Whom It May Concern: It is my medical opinion that Sridevi Orourke may return to work on 11/7/2022 with restrictions of no lifting, bending, pulling or pushing. Lopez Villalba may return to work with no restrictions on 12/5/2022. Lopez Villalba can start working from home on 11/2/2022. If you have any questions or concerns, please don't hesitate to call.     Sincerely,    Rafa France DO.

## 2022-11-01 NOTE — LETTER
[facility] Gen Surg A department of [facility] 99  Phone: [phone]  Fax: [address], CI        November 1, 2022     Patient: Nghia Forman   YOB: 1965   Date of Visit: 11/1/2022       To Whom It May Concern: It is my medical opinion that Vani Hernandez may return to work on 11/7/2022 with restrictions of no lifting, bending, pulling or pushing. Gerardo Castillo may return to work with no restrictions on 12/5/2022. If you have any questions or concerns, please don't hesitate to call.     Sincerely,        Samy Patrick, DO

## 2022-11-01 NOTE — PROGRESS NOTES
Kira Monahan is a 62 y.o. female who presents today for follow-up after robotic assisted bilateral inguinal hernia repairs approximately a week and a half ago. The patient reports that since surgery she has been doing well overall. Still having some lower abdominal pain but this does seem to be improving. She is taking occasional ibuprofen at this point. Has stopped taking narcotic pain medication. Seems that she only gets the pain usually after being up during the day and nothing that is concerning and seems to be consistent with where she is in the healing process. She is tolerating diet. She did have some reflux symptoms for several days after the surgery but this seems to have improved. Having bowel movements. She was having some constipation issues but is using over-the-counter laxative and stool softener and this seems to have improved. Denies any incisional problems. No other complaints. Past Medical History:   Diagnosis Date    Acid reflux     Anemia     COVID-19     GERD (gastroesophageal reflux disease)     Hx of migraine headaches     Menorrhagia        Past Surgical History:   Procedure Laterality Date    BREAST SURGERY Right 01/01/2014    right breast biopsy negative ? Dr. Judie Garnica  12/28/2010    HERNIA REPAIR Bilateral 10/21/2022    Laparoscopic Robotic Assisted Bilateral Inguinal Hernia Repair with mesh performed by Ila Locke DO at [facility] [address]  2005, 2010    neg    WISDOM TOOTH EXTRACTION         Current Outpatient Medications   Medication Sig Dispense Refill    Multiple Vitamins-Minerals (THERAPEUTIC MULTIVITAMIN-MINERALS) tablet Take 1 tablet by mouth daily      Probiotic Product (PROBIOTIC DAILY PO) Take by mouth       Cholecalciferol (VITAMIN D) 50 MCG (2000 UT) CAPS capsule Take by mouth (Patient not taking: Reported on 11/1/2022)       No current facility-administered medications for this visit. No Known Allergies    Family History   Problem Relation Age of Onset    Other Maternal Grandfather         dementia  bio grandmother    Heart Disease Maternal Grandfather     Hypertension Father     Elevated Lipids Father     Other Father         pulmonary fibrosis    Lung Cancer Father     High Blood Pressure Father     Cancer Brother         kidney    Arthritis Mother         one knee replaced    High Cholesterol Mother         manages mostly with diet    Miscarriages / Stillbirths Mother         stillbirth at 42 weeks    Heart Disease Mother     High Blood Pressure Mother     Cancer Mother         skin CA    Asthma Brother     Other Brother         bicuspid aortic valve    Heart Disease Paternal Grandfather 80    Heart Disease Paternal Grandmother     Other Son         lymphomatoid papullosis seen at [facility]. Other Daughter         lymphatoid papulosus       Social History     Socioeconomic History    Marital status:      Spouse name: Not on file    Number of children: Not on file    Years of education: Not on file    Highest education level: Not on file   Occupational History    Not on file   Tobacco Use    Smoking status: Never    Smokeless tobacco: Never   Vaping Use    Vaping Use: Never used   Substance and Sexual Activity    Alcohol use: Yes     Alcohol/week: 18.0 standard drinks     Types: 18 Cans of beer per week    Drug use: No    Sexual activity: Yes     Partners: Male   Other Topics Concern    Not on file   Social History Narrative    Not on file     Social Determinants of Health     Financial Resource Strain: Low Risk     Difficulty of Paying Living Expenses: Not hard at all   Food Insecurity: No Food Insecurity    Worried About [address] in the Last Year: Never true    [address] in the Last Year: Never true   Transportation Needs: No Transportation Needs    Lack of Transportation (Medical): No    Lack of Transportation (Non-Medical):  No   Physical Activity: Not on file   Stress: Not on file   Social Connections: Not on file   Intimate Partner Violence: Not on file   Housing Stability: Not on file       ROS:   Review of Systems - Negative except as noted in HPI      Objective   Vitals:    11/01/22 0952   BP: 126/82   Pulse: 76     General:in no apparent distress and well developed and well nourished  Eyes: No gross abnormalities. Ears, Nose, Throat: hearing grossly normal bilaterally  Neck: neck supple and non tender without mass  Lungs: clear to auscultation without wheezes or rales   Heart: S1S2, no mumurs, RRR  Abdomen: Soft, nondistended, no significant tenderness palpation, bowel sounds present. Incisions intact with glue in place. No signs of infection or other problems. No evidence of any hernia recurrence. Extremity: negative  Neuro: CN II-XII grossly intact      1. Postop check  Assessment & Plan:  Patient seems to be doing well after surgery and is healing as expected. We discussed the findings at surgery and she voiced understanding. She did have bilateral excision of lipomas pathology was consistent with this. I have discussed with her the importance of continued activity restrictions. These will be in place for full 6 weeks and at 6 weeks and then may begin returning to regular activity over several weeks. Have stressed with her that any significant lifting or exercises may take several weeks to work back to. She may begin submerging in water. Okay to drive as long as not taking pain medication. At this point will have patient follow-up with me as needed. She is encouraged to call with any questions concerns or problems. Work note provided to return to work next week with activity restrictions and then full duty at 6 weeks.        (Please note that portions of this note were completed with a voice recognition program.  Efforts were made to edit the dictations but occasionally words are mis-transcribed.)

## 2022-11-01 NOTE — ASSESSMENT & PLAN NOTE
Patient seems to be doing well after surgery and is healing as expected. We discussed the findings at surgery and she voiced understanding. She did have bilateral excision of lipomas pathology was consistent with this. I have discussed with her the importance of continued activity restrictions. These will be in place for full 6 weeks and at 6 weeks and then may begin returning to regular activity over several weeks. Have stressed with her that any significant lifting or exercises may take several weeks to work back to. She may begin submerging in water. Okay to drive as long as not taking pain medication. At this point will have patient follow-up with me as needed. She is encouraged to call with any questions concerns or problems. Work note provided to return to work next week with activity restrictions and then full duty at 6 weeks.

## 2022-11-01 NOTE — TELEPHONE ENCOUNTER
Patient called the office wondering if her insurance company has reached out to a [address]. Please advise and give her a call back.

## 2022-11-01 NOTE — LETTER
[facility] Gen Surg A department of [facility] [place] [facility]  Phone: [phone]  Fax: [address], IM        November 1, 2022     Patient: Ayana Zhou   YOB: 1965   Date of Visit: 11/1/2022       To Whom It May Concern: It is my medical opinion that Nelsy Suarez may return to work on 11/7/2022 with restrictions of no lifting, bending, pulling or pushing. Lydia Hurtado may return to work with no restrictions on 12/5/2022. Lydia Hurtado can start working from home on 11/2. If you have any questions or concerns, please don't hesitate to call.     Sincerely,        Lilibeth Hartmann, DO

## 2022-11-02 NOTE — TELEPHONE ENCOUNTER
Patient called the office stating she needs a sentence added to her note that was written  yesterday she needs somewhere in the note to say \" can start working from home 11/2. \" Per insurance purposes and would like the note emailed to her if possible at [email or web address]. please advise.

## 2022-11-29 ENCOUNTER — PATIENT MESSAGE (OUTPATIENT)
Dept: FAMILY MEDICINE CLINIC | Age: 57
End: 2022-11-29

## 2022-11-29 DIAGNOSIS — B00.1 HERPES SIMPLEX LABIALIS: ICD-10-CM

## 2022-11-29 RX ORDER — PENCICLOVIR 1 %
CREAM (GRAM) TOPICAL
Qty: 3 G | Refills: 5 | Status: SHIPPED | OUTPATIENT
Start: 2022-11-29 | End: 2022-12-03

## 2022-11-29 NOTE — TELEPHONE ENCOUNTER
From: Kasandra Camacho  To: Hali Neumann  Sent: 11/29/2022 2:27 PM EST  Subject: Denavir Cream     I have used this cream for my cold sores for years. I rarely need to use it. I have recently had an outbeak (both lower and upper lips). Can I get this prescription called in for me please?

## 2022-12-01 PROBLEM — Z09 POSTOP CHECK: Status: RESOLVED | Noted: 2022-11-01 | Resolved: 2022-12-01

## 2023-01-18 ENCOUNTER — OFFICE VISIT (OUTPATIENT)
Dept: UROLOGY | Age: 58
End: 2023-01-18
Payer: COMMERCIAL

## 2023-01-18 VITALS
WEIGHT: 165 LBS | HEART RATE: 74 BPM | OXYGEN SATURATION: 98 % | HEIGHT: 63 IN | RESPIRATION RATE: 16 BRPM | DIASTOLIC BLOOD PRESSURE: 82 MMHG | BODY MASS INDEX: 29.23 KG/M2 | SYSTOLIC BLOOD PRESSURE: 128 MMHG

## 2023-01-18 DIAGNOSIS — N39.41 URGE INCONTINENCE: Primary | ICD-10-CM

## 2023-01-18 DIAGNOSIS — N81.10 POP-Q STAGE 1 CYSTOCELE: ICD-10-CM

## 2023-01-18 PROCEDURE — G8484 FLU IMMUNIZE NO ADMIN: HCPCS | Performed by: UROLOGY

## 2023-01-18 PROCEDURE — 1036F TOBACCO NON-USER: CPT | Performed by: UROLOGY

## 2023-01-18 PROCEDURE — 99204 OFFICE O/P NEW MOD 45 MIN: CPT | Performed by: UROLOGY

## 2023-01-18 PROCEDURE — G8427 DOCREV CUR MEDS BY ELIG CLIN: HCPCS | Performed by: UROLOGY

## 2023-01-18 PROCEDURE — G8419 CALC BMI OUT NRM PARAM NOF/U: HCPCS | Performed by: UROLOGY

## 2023-01-18 PROCEDURE — 3017F COLORECTAL CA SCREEN DOC REV: CPT | Performed by: UROLOGY

## 2023-01-18 NOTE — PROGRESS NOTES
Gisell Johnson MD  Urology Clinic office visit  NEW PATIENT    Patient:  Frank Palma  YOB: 1965  Date: 1/18/2023    HISTORY OF PRESENT ILLNESS:   The patient is a 62 y.o. female who presents today for evaluation of the following problems:      1. Urge incontinence    2. POP-Q stage 1 cystocele         Overall the problem(s) : are worsening. Associated Symptoms: No dysuria, gross hematuria. Pain Severity:      Summary of old records: seen previously for possible cystocele repair  (Patient's old records, notes and chart reviewed and summarized above.)      Onset > 6 months  10/2022 had hernia repair by Dr Alisia Gabriel is described as moderate. The course of symptoms over time is worsening. Alleviating factors: none  Worsening factors: none  Urge incontinence just in the morning; as she stepping into the shower  Minimal MAURILIO  No hematuria, no UTIs  Reportedly had jodi pelvic which showed mild cystocele  CT 9/2022 negative    Urinalysis today:  No results found for this visit on 01/18/23. Last BUN and creatinine:  Lab Results   Component Value Date    BUN 17 10/04/2022     Lab Results   Component Value Date    CREATININE 0.69 10/04/2022       Imaging Reviewed during this Office Visit:   (results were independently reviewed by physician and radiology report verified)  I independently reviewed and verified the images and reports from:    XR CHEST (2 VW)    Result Date: 10/4/2022  EXAMINATION: TWO XRAY VIEWS OF THE CHEST 10/4/2022 3:08 pm COMPARISON: November 19, 2021 HISTORY: ORDERING SYSTEM PROVIDED HISTORY: Pre-op chest exam TECHNOLOGIST PROVIDED HISTORY: Reason for Exam: Pre-op testing; no chest complaints at this time, non smoker FINDINGS: The lungs are without acute focal process. There is no effusion or pneumothorax. The cardiomediastinal silhouette is without acute process. The osseous structures are without acute process. No acute process.          PAST MEDICAL, FAMILY AND SOCIAL HISTORY:  Past Medical History:   Diagnosis Date    Acid reflux     Anemia     COVID-19     GERD (gastroesophageal reflux disease)     Hx of migraine headaches     Menorrhagia      Past Surgical History:   Procedure Laterality Date    BREAST SURGERY Right 01/01/2014    right breast biopsy negative ? Dr. Tara Dior  12/28/2010    HERNIA REPAIR Bilateral 10/21/2022    Laparoscopic Robotic Assisted Bilateral Inguinal Hernia Repair with mesh performed by Artem Yo DO at [address]  2005, 2010    neg    WISDOM TOOTH EXTRACTION       Family History   Problem Relation Age of Onset    Other Maternal Grandfather         dementia  bio grandmother    Heart Disease Maternal Grandfather     Hypertension Father     Elevated Lipids Father     Other Father         pulmonary fibrosis    Lung Cancer Father     High Blood Pressure Father     Cancer Brother         kidney    Arthritis Mother         one knee replaced    High Cholesterol Mother         manages mostly with diet    Miscarriages / Stillbirths Mother         stillbirth at 42 weeks    Heart Disease Mother     High Blood Pressure Mother     Cancer Mother         skin CA    Asthma Brother     Other Brother         bicuspid aortic valve    Heart Disease Paternal Grandfather 80    Heart Disease Paternal Grandmother     Other Son         lymphomatoid papullosis seen at [facility] clinic. Other Daughter         lymphatoid papulosus     Outpatient Medications Marked as Taking for the 1/18/23 encounter (Office Visit) with Neo Wilson MD   Medication Sig Dispense Refill    Multiple Vitamins-Minerals (THERAPEUTIC MULTIVITAMIN-MINERALS) tablet Take 1 tablet by mouth daily      Probiotic Product (PROBIOTIC DAILY PO) Take by mouth          Patient has no known allergies.   Social History     Tobacco Use   Smoking Status Never   Smokeless Tobacco Never       Social History     Substance and Sexual Activity   Alcohol Use Yes    Alcohol/week: 18.0 standard drinks    Types: 18 Cans of beer per week       REVIEW OF SYSTEMS:  Constitutional: negative  Eyes: negative  Respiratory: negative  Cardiovascular: negative  Gastrointestinal: negative  Genitourinary: negative except for what is in HPI  Musculoskeletal: negative  Skin: negative   Neurological: negative  Hematological/Lymphatic: negative  Psychological: negative    Physical Exam:    This a 62 y.o. female      Vitals:    01/18/23 0837   BP: 128/82   Pulse: 74   Resp: 16   SpO2: 98%     Constitutional: Patient in no acute distress. Neuro: Alert and oriented to person, place and time. Psych: mood and affect normal  HEENT negative  Lungs: Respiratory effort is normal  Cardiovascular: Normal peripheral pulses  Abdomen: Soft, non-tender, non-distended   Lymphatics: No palpable lymphadenopathy. Bladder non-tender and not distended. Assessment and Plan      1. Urge incontinence    2. POP-Q stage 1 cystocele           Plan:      Discussed meds  Discussed PFPT  Discussed cystoscopy and pelvic exam    She will try PFPT     Will arrange    Follow up 2-3 months    No follow-ups on file. Prescriptions Ordered:  No orders of the defined types were placed in this encounter. Orders Placed:  No orders of the defined types were placed in this encounter.            MD Michelle Barnes M.D, MD  [facility] [place] [facility]

## 2023-01-20 ENCOUNTER — TELEPHONE (OUTPATIENT)
Dept: OBGYN | Age: 58
End: 2023-01-20

## 2023-02-07 ENCOUNTER — OFFICE VISIT (OUTPATIENT)
Dept: FAMILY MEDICINE CLINIC | Age: 58
End: 2023-02-07
Payer: COMMERCIAL

## 2023-02-07 VITALS
WEIGHT: 166 LBS | HEIGHT: 63 IN | BODY MASS INDEX: 29.41 KG/M2 | SYSTOLIC BLOOD PRESSURE: 110 MMHG | HEART RATE: 72 BPM | DIASTOLIC BLOOD PRESSURE: 70 MMHG

## 2023-02-07 DIAGNOSIS — Z00.00 ANNUAL PHYSICAL EXAM: Primary | ICD-10-CM

## 2023-02-07 PROCEDURE — G8484 FLU IMMUNIZE NO ADMIN: HCPCS | Performed by: PHYSICIAN ASSISTANT

## 2023-02-07 PROCEDURE — 99396 PREV VISIT EST AGE 40-64: CPT | Performed by: PHYSICIAN ASSISTANT

## 2023-02-07 SDOH — ECONOMIC STABILITY: FOOD INSECURITY: WITHIN THE PAST 12 MONTHS, THE FOOD YOU BOUGHT JUST DIDN'T LAST AND YOU DIDN'T HAVE MONEY TO GET MORE.: NEVER TRUE

## 2023-02-07 SDOH — ECONOMIC STABILITY: INCOME INSECURITY: HOW HARD IS IT FOR YOU TO PAY FOR THE VERY BASICS LIKE FOOD, HOUSING, MEDICAL CARE, AND HEATING?: NOT HARD AT ALL

## 2023-02-07 SDOH — ECONOMIC STABILITY: FOOD INSECURITY: WITHIN THE PAST 12 MONTHS, YOU WORRIED THAT YOUR FOOD WOULD RUN OUT BEFORE YOU GOT MONEY TO BUY MORE.: NEVER TRUE

## 2023-02-07 SDOH — ECONOMIC STABILITY: HOUSING INSECURITY
IN THE LAST 12 MONTHS, WAS THERE A TIME WHEN YOU DID NOT HAVE A STEADY PLACE TO SLEEP OR SLEPT IN A SHELTER (INCLUDING NOW)?: NO

## 2023-02-07 ASSESSMENT — PATIENT HEALTH QUESTIONNAIRE - PHQ9
SUM OF ALL RESPONSES TO PHQ QUESTIONS 1-9: 0
SUM OF ALL RESPONSES TO PHQ QUESTIONS 1-9: 0
1. LITTLE INTEREST OR PLEASURE IN DOING THINGS: 0
SUM OF ALL RESPONSES TO PHQ QUESTIONS 1-9: 0
SUM OF ALL RESPONSES TO PHQ QUESTIONS 1-9: 0

## 2023-02-07 NOTE — PROGRESS NOTES
CHIEF COMPLAINT  Chief Complaint   Patient presents with    Annual Exam    Other     Discuss weight fatigue        SUBJECTIVE  Dahlia Santos is a 62 y.o. female who presents to the office for annual wellness examination. Patient says that overall she has been doing well recently. She admits to frustrations with her weight. She continues to walk and do weights for exercise. She is very cautious with her dietary intake. Patient monitors her water intake. Patient says that she is just feeling older since [facility]. She denies concerns or complaints today. ROS  All other review of systems negative, except for those noted. PAST MEDICAL HISTORY    Past Medical History:   Diagnosis Date    Acid reflux     Anemia     COVID-19     GERD (gastroesophageal reflux disease)     Hx of migraine headaches     Menorrhagia        SURGICAL HISTORY    Past Surgical History:   Procedure Laterality Date    BREAST SURGERY Right 01/01/2014    right breast biopsy negative ? Dr. Jarvis Wolf  12/28/2010    HERNIA REPAIR Bilateral 10/21/2022    Laparoscopic Robotic Assisted Bilateral Inguinal Hernia Repair with mesh performed by Sherryle Huxley, DO at [facility]  2005, 2010    neg    WISDOM TOOTH EXTRACTION         FAMILY HISTORY    Family History   Problem Relation Age of Onset    Other Maternal Grandfather         dementia  bio grandmother    Heart Disease Maternal Grandfather     Hypertension Father     Elevated Lipids Father     Other Father         pulmonary fibrosis    Lung Cancer Father     High Blood Pressure Father     Cancer Brother         kidney    Arthritis Mother         one knee replaced    High Cholesterol Mother         manages mostly with diet    Miscarriages / Stillbirths Mother         stillbirth at 42 weeks    Heart Disease Mother     High Blood Pressure Mother     Cancer Mother         skin CA    Asthma Brother     Other Brother         bicuspid aortic valve    Heart Disease Paternal Grandfather 80    Heart Disease Paternal Grandmother     Other Son         lymphomatoid papullosis seen at [facility]. Other Daughter         lymphatoid papulosus       SOCIAL HISTORY    Social History     Socioeconomic History    Marital status:      Spouse name: None    Number of children: None    Years of education: None    Highest education level: None   Tobacco Use    Smoking status: Never    Smokeless tobacco: Never   Vaping Use    Vaping Use: Never used   Substance and Sexual Activity    Alcohol use: Yes     Alcohol/week: 24.0 standard drinks     Types: 24 Cans of beer per week    Drug use: No    Sexual activity: Yes     Partners: Male     Social Determinants of Health     Financial Resource Strain: Low Risk     Difficulty of Paying Living Expenses: Not hard at all   Food Insecurity: No Food Insecurity    Worried About [address] in the Last Year: Never true    [address] in the Last Year: Never true   Transportation Needs: No Transportation Needs    Lack of Transportation (Medical): No    Lack of Transportation (Non-Medical): No   Housing Stability: Unknown    Unstable Housing in the Last Year: No       MEDICATIONS  Current Outpatient Medications   Medication Sig Dispense Refill    Multiple Vitamins-Minerals (THERAPEUTIC MULTIVITAMIN-MINERALS) tablet Take 1 tablet by mouth daily      Probiotic Product (PROBIOTIC DAILY PO) Take by mouth        No current facility-administered medications for this visit.        ALLERGIES  No Known Allergies    PHYSICAL EXAM:   Vital Signs: /70 (Site: Left Upper Arm, Position: Sitting, Cuff Size: Large Adult)   Pulse 72   Ht 5' 3\" (1.6 m)   Wt 166 lb (75.3 kg)   LMP 11/02/2015 (Approximate)   BMI 29.41 kg/m²   Constitutional:  Alert and oriented x 3   HENT:  Normocephalic, Atraumatic, Bilateral external ears normal, Oropharynx moist, No oral exudates, Nose normal. Neck- Normal range of motion, No tenderness, Supple, No stridor. Eyes:  PERRL, Conjunctiva normal, No discharge. Respiratory:  Normal breath sounds, No respiratory distress, No wheezing, No chest tenderness. Cardiovascular:  Normal heart rate, Normal rhythm. GI:  Bowel sounds normal, Soft, No tenderness, No masses, No pulsatile masses. Integument:  Warm, Dry, No erythema, No rash. Lymphatic:  No lymphadenopathy noted. Neurologic:  Alert & oriented x 3, Normal motor function, Normal sensory function, No focal deficits noted. Psychiatric:  Affect normal, Mood normal.       FINAL DIAGNOSIS AND ORDERS   Diagnosis Orders   1. Annual physical exam            ASSESSMENT & PLAN  1. Healthy diet and routine exercise. Interval history reviewed. Follow-up in 1 year/sooner PRN. DISCHARGE MEDS  Outpatient Encounter Medications as of 2/7/2023   Medication Sig Dispense Refill    Multiple Vitamins-Minerals (THERAPEUTIC MULTIVITAMIN-MINERALS) tablet Take 1 tablet by mouth daily      Probiotic Product (PROBIOTIC DAILY PO) Take by mouth        No facility-administered encounter medications on file as of 2/7/2023.

## 2023-02-21 ENCOUNTER — PATIENT MESSAGE (OUTPATIENT)
Dept: FAMILY MEDICINE CLINIC | Age: 58
End: 2023-02-21

## 2023-02-21 DIAGNOSIS — D22.9 CHANGE IN MOLE: Primary | ICD-10-CM

## 2023-02-21 NOTE — TELEPHONE ENCOUNTER
From: Nghia Forman  To: Nida Shetty  Sent: 2/21/2023 7:24 AM EST  Subject: Mole gone bad    Good morning :)    Last Thursday I noticed what I believe was a red mole at one time, but now looks infected. The skin around it is red, hard and painful. This is in the groin area. I may have cut it when shaving?? I've let it go for about 5 days and the red area around it seems to be growing. Why am I the person with the weirdest medical stuff? ? lol    What do you recommend my next steps to be?     Thank you for your help (always)~  Suyapa

## 2023-02-21 NOTE — TELEPHONE ENCOUNTER
If patient thinks area is infected, patient should be seen in UC. Dermatology referral for mole evaluation.

## 2023-02-22 ENCOUNTER — OFFICE VISIT (OUTPATIENT)
Dept: PRIMARY CARE CLINIC | Age: 58
End: 2023-02-22
Payer: COMMERCIAL

## 2023-02-22 VITALS
HEART RATE: 81 BPM | OXYGEN SATURATION: 97 % | TEMPERATURE: 97.1 F | RESPIRATION RATE: 16 BRPM | HEIGHT: 63 IN | WEIGHT: 174.2 LBS | DIASTOLIC BLOOD PRESSURE: 84 MMHG | BODY MASS INDEX: 30.87 KG/M2 | SYSTOLIC BLOOD PRESSURE: 130 MMHG

## 2023-02-22 DIAGNOSIS — L03.311 CELLULITIS OF RIGHT ABDOMINAL WALL: Primary | ICD-10-CM

## 2023-02-22 DIAGNOSIS — L02.211 ABSCESS OF SKIN OF ABDOMEN: ICD-10-CM

## 2023-02-22 PROCEDURE — G8427 DOCREV CUR MEDS BY ELIG CLIN: HCPCS | Performed by: NURSE PRACTITIONER

## 2023-02-22 PROCEDURE — 3017F COLORECTAL CA SCREEN DOC REV: CPT | Performed by: NURSE PRACTITIONER

## 2023-02-22 PROCEDURE — G8484 FLU IMMUNIZE NO ADMIN: HCPCS | Performed by: NURSE PRACTITIONER

## 2023-02-22 PROCEDURE — 99213 OFFICE O/P EST LOW 20 MIN: CPT | Performed by: NURSE PRACTITIONER

## 2023-02-22 PROCEDURE — G8417 CALC BMI ABV UP PARAM F/U: HCPCS | Performed by: NURSE PRACTITIONER

## 2023-02-22 PROCEDURE — 1036F TOBACCO NON-USER: CPT | Performed by: NURSE PRACTITIONER

## 2023-02-22 PROCEDURE — 10060 I&D ABSCESS SIMPLE/SINGLE: CPT | Performed by: NURSE PRACTITIONER

## 2023-02-22 RX ORDER — SULFAMETHOXAZOLE AND TRIMETHOPRIM 800; 160 MG/1; MG/1
1 TABLET ORAL 2 TIMES DAILY
Qty: 20 TABLET | Refills: 0 | Status: SHIPPED | OUTPATIENT
Start: 2023-02-22 | End: 2023-03-04

## 2023-02-22 NOTE — PROGRESS NOTES
[facility]             [address], [address]                        Telephone [phone]             Fax [phone]     Jo Kumar  1965  UZK:[id number]   Date of visit:  2/22/2023    Subjective:    Jo Kumar is a 62 y.o.  female who presents to [facility] today (2/22/2023) for evaluation of:    Chief Complaint   Patient presents with    Other     Noticed a small spot (mole/ skin) tag located on right lower abdomen, under c- section scar. The patient states that area is red and hard. Pt. Believes they may have cut area shaving. - Area is painful. Pain rating 3       Skin Problem  This is a new problem. The current episode started in the past 7 days (X 6 days). The problem has been gradually worsening since onset. Pertinent negatives include no fever. (Redness and mild tenderness of right pubic area.) Treatments tried: neosporin. The treatment provided no relief. She has the following problem list:  Patient Active Problem List   Diagnosis    Acid reflux    Hx of migraine headaches    COVID-19    Hypoxia    Pelvic pressure in female    Bladder pain    Non-recurrent bilateral inguinal hernia without obstruction or gangrene    Left inguinal hernia    Right inguinal hernia        Current medications are:  Current Outpatient Medications   Medication Sig Dispense Refill    sulfamethoxazole-trimethoprim (BACTRIM DS;SEPTRA DS) 800-160 MG per tablet Take 1 tablet by mouth 2 times daily for 10 days 20 tablet 0    Multiple Vitamins-Minerals (THERAPEUTIC MULTIVITAMIN-MINERALS) tablet Take 1 tablet by mouth daily      Probiotic Product (PROBIOTIC DAILY PO) Take by mouth        No current facility-administered medications for this visit. She has No Known Allergies. .    She  reports that she has never smoked.  She has never used smokeless tobacco.      Objective:    Vitals:    02/22/23 0820 BP: 130/84   Site: Right Upper Arm   Position: Sitting   Cuff Size: Medium Adult   Pulse: 81   Resp: 16   Temp: 97.1 °F (36.2 °C)   TempSrc: Tympanic   SpO2: 97%   Weight: 174 lb 3.2 oz (79 kg)   Height: 5' 3\" (1.6 m)     Body mass index is 30.86 kg/m². Review of Systems   Constitutional:  Negative for fever. Physical Exam  Vitals and nursing note reviewed. Constitutional:       Appearance: Normal appearance. She is well-developed. HENT:      Head: Normocephalic. Jaw: There is normal jaw occlusion. Mouth/Throat:      Lips: Pink. Mouth: Mucous membranes are moist.      Pharynx: Oropharynx is clear. Uvula midline. Eyes:      Pupils: Pupils are equal, round, and reactive to light. Cardiovascular:      Rate and Rhythm: Normal rate and regular rhythm. Heart sounds: Normal heart sounds. Pulmonary:      Effort: Pulmonary effort is normal.      Breath sounds: Normal breath sounds and air entry. Abdominal:      General: Abdomen is flat. Bowel sounds are normal.      Palpations: Abdomen is soft. Musculoskeletal:      Cervical back: Normal range of motion and neck supple. Skin:     General: Skin is warm and dry. Neurological:      General: No focal deficit present. Mental Status: She is alert and oriented to person, place, and time. Psychiatric:         Behavior: Behavior normal.         Thought Content: Thought content normal.       Assessment and Plan:    No results found for this visit on 02/22/23. Diagnosis Orders   1. Cellulitis of right abdominal wall  sulfamethoxazole-trimethoprim (BACTRIM DS;SEPTRA DS) 800-160 MG per tablet      2. Abscess of skin of abdomen  40087 - [place] DRAIN SKIN ABSCESS SIMPLE          The procedure, risks and complications have been discussed in detail (including, but not limited to pain, infection, bleeding) with the patient, and the She has given consent to the procedure.     The skin was sterilely prepped and draped over the affected area in the usual fashion. Skin was cleansed with betadine and alcohol. After adequate local anesthesia with 1 ml of lidocaine 1% with epi, I&D with a #11 blade was performed on the right  abdomen . Purulent drainage: present. Culture of drainage was not obtained. She was observed until stable. She was instructed on post-procedure wound care. Pt was instructed to do warm compresses to affected area as needed. May take over the counter Tylenol/Ibuprofen as directed for pain, fever. Pt instructed to take antibiotic as prescribed for the full course. Pt instructed to monitor for signs of infection: fever over 100.4, redness, warmth, drainage, foul odor. Pt to follow up with PCP/Urgent Care if symptoms persist or worsen. The use, risks, benefits, and side effects of prescribed or recommended medications were discussed. All questions were answered and the patient/caregiver voiced understanding.         Procedures    62555 - [place] DRAIN SKIN ABSCESS SIMPLE           Electronically signed by TINO Spear CNP on 2/22/23 at 8:36 AM EST

## 2023-07-18 ENCOUNTER — HOSPITAL ENCOUNTER (OUTPATIENT)
Age: 58
Discharge: HOME | End: 2023-07-18
Payer: COMMERCIAL

## 2023-07-18 ENCOUNTER — OFFICE VISIT (OUTPATIENT)
Dept: FAMILY MEDICINE CLINIC | Age: 58
End: 2023-07-18
Payer: COMMERCIAL

## 2023-07-18 VITALS
OXYGEN SATURATION: 96 % | WEIGHT: 163.6 LBS | HEART RATE: 80 BPM | BODY MASS INDEX: 28.99 KG/M2 | HEIGHT: 63 IN | DIASTOLIC BLOOD PRESSURE: 84 MMHG | SYSTOLIC BLOOD PRESSURE: 124 MMHG

## 2023-07-18 DIAGNOSIS — R06.00 DYSPNEA, UNSPECIFIED TYPE: ICD-10-CM

## 2023-07-18 DIAGNOSIS — R06.00 DYSPNEA, UNSPECIFIED TYPE: Primary | ICD-10-CM

## 2023-07-18 LAB
ANION GAP SERPL CALCULATED.3IONS-SCNC: 10 MMOL/L (ref 9–17)
BASOPHILS # BLD: <0.03 K/UL (ref 0–0.2)
BASOPHILS NFR BLD: 0 % (ref 0–2)
BUN SERPL-MCNC: 15 MG/DL (ref 6–20)
BUN/CREAT SERPL: 21 (ref 9–20)
CALCIUM SERPL-MCNC: 9.8 MG/DL (ref 8.6–10.4)
CHLORIDE SERPL-SCNC: 105 MMOL/L (ref 98–107)
CO2 SERPL-SCNC: 28 MMOL/L (ref 20–31)
CREAT SERPL-MCNC: 0.7 MG/DL (ref 0.5–0.9)
D DIMER PPP FEU-MCNC: <0.27 UG/ML FEU (ref 0–0.59)
EOSINOPHIL # BLD: 0.14 K/UL (ref 0–0.44)
EOSINOPHILS RELATIVE PERCENT: 2 % (ref 1–4)
ERYTHROCYTE [DISTWIDTH] IN BLOOD BY AUTOMATED COUNT: 12.6 % (ref 11.8–14.4)
GFR SERPL CREATININE-BSD FRML MDRD: >60 ML/MIN/1.73M2
GLUCOSE SERPL-MCNC: 101 MG/DL (ref 70–99)
HCT VFR BLD AUTO: 44.2 % (ref 36.3–47.1)
HGB BLD-MCNC: 15.2 G/DL (ref 11.9–15.1)
IMM GRANULOCYTES # BLD AUTO: <0.03 K/UL (ref 0–0.3)
IMM GRANULOCYTES NFR BLD: 0 %
LYMPHOCYTES # BLD: 30 % (ref 24–43)
LYMPHOCYTES NFR BLD: 1.74 K/UL (ref 1.1–3.7)
MCH RBC QN AUTO: 31.1 PG (ref 25.2–33.5)
MCHC RBC AUTO-ENTMCNC: 34.4 G/DL (ref 25.2–33.5)
MCV RBC AUTO: 90.6 FL (ref 82.6–102.9)
MONOCYTES NFR BLD: 0.41 K/UL (ref 0.1–1.2)
MONOCYTES NFR BLD: 7 % (ref 3–12)
NEUTROPHILS NFR BLD: 61 % (ref 36–65)
NEUTS SEG NFR BLD: 3.5 K/UL (ref 1.5–8.1)
NRBC BLD-RTO: 0 PER 100 WBC
PLATELET # BLD AUTO: 192 K/UL (ref 138–453)
PMV BLD AUTO: 11.1 FL (ref 8.1–13.5)
POTASSIUM SERPL-SCNC: 4.2 MMOL/L (ref 3.7–5.3)
RBC # BLD AUTO: 4.88 M/UL (ref 3.95–5.11)
SODIUM SERPL-SCNC: 143 MMOL/L (ref 135–144)
WBC OTHER # BLD: 5.8 K/UL (ref 3.5–11.3)

## 2023-07-18 PROCEDURE — G8417 CALC BMI ABV UP PARAM F/U: HCPCS | Performed by: PHYSICIAN ASSISTANT

## 2023-07-18 PROCEDURE — 99214 OFFICE O/P EST MOD 30 MIN: CPT | Performed by: PHYSICIAN ASSISTANT

## 2023-07-18 PROCEDURE — 1036F TOBACCO NON-USER: CPT | Performed by: PHYSICIAN ASSISTANT

## 2023-07-18 PROCEDURE — 3017F COLORECTAL CA SCREEN DOC REV: CPT | Performed by: PHYSICIAN ASSISTANT

## 2023-07-18 PROCEDURE — 80048 BASIC METABOLIC PNL TOTAL CA: CPT

## 2023-07-18 PROCEDURE — 85379 FIBRIN DEGRADATION QUANT: CPT

## 2023-07-18 PROCEDURE — 85027 COMPLETE CBC AUTOMATED: CPT

## 2023-07-18 PROCEDURE — 36415 COLL VENOUS BLD VENIPUNCTURE: CPT

## 2023-07-18 PROCEDURE — G8427 DOCREV CUR MEDS BY ELIG CLIN: HCPCS | Performed by: PHYSICIAN ASSISTANT

## 2023-07-18 ASSESSMENT — ENCOUNTER SYMPTOMS
GASTROINTESTINAL NEGATIVE: 1
WHEEZING: 0
COUGH: 0
SHORTNESS OF BREATH: 1
SPUTUM PRODUCTION: 0

## 2023-07-18 NOTE — PROGRESS NOTES
Subjective:      Patient ID: Selin Donald is a 62 y.o. female. Shortness of Breath  This is a recurrent problem. The current episode started in the past 7 days. The problem has been gradually worsening. Pertinent negatives include no chest pain, claudication, leg pain, leg swelling, sputum production, syncope or wheezing. Nothing aggravates the symptoms. Risk factors include prolonged immobilization. She has tried nothing for the symptoms. Patient went on a prolonged road trip in May. She denies any history/family history of blood clots. Review of Systems   Constitutional: Negative. HENT: Negative. Respiratory:  Positive for shortness of breath. Negative for cough, sputum production and wheezing. Cardiovascular:  Negative for chest pain, palpitations, claudication, leg swelling and syncope. Gastrointestinal: Negative. Genitourinary: Negative. Past Medical History:   Diagnosis Date    Acid reflux     Anemia     COVID-19     GERD (gastroesophageal reflux disease)     Hx of migraine headaches     Menorrhagia      Past Surgical History:   Procedure Laterality Date    BREAST SURGERY Right 01/01/2014    right breast biopsy negative ? Dr. Lon Sandoval  12/28/2010    HERNIA REPAIR Bilateral 10/21/2022    Laparoscopic Robotic Assisted Bilateral Inguinal Hernia Repair with mesh performed by Leonidas Richter DO at [address] [place] [address]  2005, 2010    neg    WISDOM TOOTH EXTRACTION       Social History     Tobacco Use    Smoking status: Never    Smokeless tobacco: Never   Vaping Use    Vaping Use: Never used   Substance Use Topics    Alcohol use: Yes     Alcohol/week: 15.0 standard drinks     Types: 15 Cans of beer per week    Drug use: No       Objective:   Physical Exam  HENT:      Head: Normocephalic.       Right Ear: Tympanic membrane normal.      Left Ear: Tympanic membrane normal.      Mouth/Throat:      Mouth: Mucous membranes are

## 2023-07-21 ENCOUNTER — TELEPHONE (OUTPATIENT)
Dept: ULTRASOUND IMAGING | Age: 58
End: 2023-07-21

## 2023-07-21 DIAGNOSIS — Z12.31 ENCOUNTER FOR SCREENING MAMMOGRAM FOR MALIGNANT NEOPLASM OF BREAST: Primary | ICD-10-CM

## 2023-07-21 NOTE — TELEPHONE ENCOUNTER
This patient is scheduled Monday, 7/24/23 at 8:00 am for a screening mammogram. We are in need of an order to be placed at your earliest convenience for this exam.   Thank you!

## 2023-07-24 ENCOUNTER — HOSPITAL ENCOUNTER (OUTPATIENT)
Dept: MAMMOGRAPHY | Age: 58
Discharge: HOME | End: 2023-07-26
Payer: COMMERCIAL

## 2023-07-24 DIAGNOSIS — Z12.31 ENCOUNTER FOR SCREENING MAMMOGRAM FOR MALIGNANT NEOPLASM OF BREAST: ICD-10-CM

## 2023-07-24 PROCEDURE — 77063 BREAST TOMOSYNTHESIS BI: CPT

## 2023-07-31 ENCOUNTER — HOSPITAL ENCOUNTER (OUTPATIENT)
Age: 58
Setting detail: SPECIMEN
Discharge: HOME | End: 2023-07-31
Payer: COMMERCIAL

## 2023-07-31 ENCOUNTER — HOSPITAL ENCOUNTER (OUTPATIENT)
Age: 58
Discharge: HOME | End: 2023-07-31
Payer: COMMERCIAL

## 2023-07-31 ENCOUNTER — OFFICE VISIT (OUTPATIENT)
Dept: OBGYN | Age: 58
End: 2023-07-31

## 2023-07-31 VITALS
DIASTOLIC BLOOD PRESSURE: 88 MMHG | SYSTOLIC BLOOD PRESSURE: 136 MMHG | WEIGHT: 165.8 LBS | HEART RATE: 79 BPM | BODY MASS INDEX: 29.38 KG/M2 | OXYGEN SATURATION: 96 % | HEIGHT: 63 IN

## 2023-07-31 DIAGNOSIS — M85.88 OSTEOPENIA OF LUMBAR SPINE: ICD-10-CM

## 2023-07-31 DIAGNOSIS — Z12.4 CERVICAL CANCER SCREENING: ICD-10-CM

## 2023-07-31 DIAGNOSIS — Z12.4 CERVICAL CANCER SCREENING: Primary | ICD-10-CM

## 2023-07-31 LAB — 25(OH)D3 SERPL-MCNC: 37 NG/ML

## 2023-07-31 PROCEDURE — 87624 HPV HI-RISK TYP POOLED RSLT: CPT

## 2023-07-31 PROCEDURE — 82306 VITAMIN D 25 HYDROXY: CPT

## 2023-07-31 PROCEDURE — 36415 COLL VENOUS BLD VENIPUNCTURE: CPT

## 2023-07-31 PROCEDURE — G0145 SCR C/V CYTO,THINLAYER,RESCR: HCPCS

## 2023-07-31 SDOH — ECONOMIC STABILITY: FOOD INSECURITY: WITHIN THE PAST 12 MONTHS, THE FOOD YOU BOUGHT JUST DIDN'T LAST AND YOU DIDN'T HAVE MONEY TO GET MORE.: NEVER TRUE

## 2023-07-31 SDOH — ECONOMIC STABILITY: FOOD INSECURITY: WITHIN THE PAST 12 MONTHS, YOU WORRIED THAT YOUR FOOD WOULD RUN OUT BEFORE YOU GOT MONEY TO BUY MORE.: NEVER TRUE

## 2023-07-31 SDOH — ECONOMIC STABILITY: INCOME INSECURITY: HOW HARD IS IT FOR YOU TO PAY FOR THE VERY BASICS LIKE FOOD, HOUSING, MEDICAL CARE, AND HEATING?: NOT HARD AT ALL

## 2023-07-31 ASSESSMENT — PATIENT HEALTH QUESTIONNAIRE - PHQ9
2. FEELING DOWN, DEPRESSED OR HOPELESS: 0
SUM OF ALL RESPONSES TO PHQ QUESTIONS 1-9: 0
SUM OF ALL RESPONSES TO PHQ QUESTIONS 1-9: 0
SUM OF ALL RESPONSES TO PHQ9 QUESTIONS 1 & 2: 0
SUM OF ALL RESPONSES TO PHQ QUESTIONS 1-9: 0
SUM OF ALL RESPONSES TO PHQ QUESTIONS 1-9: 0
1. LITTLE INTEREST OR PLEASURE IN DOING THINGS: 0

## 2023-08-02 LAB
HPV I/H RISK 4 DNA CVX QL NAA+PROBE: NOT DETECTED
HPV SAMPLE: NORMAL
HPV, INTERPRETATION: NORMAL
HPV16 DNA CVX QL NAA+PROBE: NOT DETECTED
HPV18 DNA CVX QL NAA+PROBE: NOT DETECTED
SPECIMEN DESCRIPTION: NORMAL

## 2023-08-03 ENCOUNTER — HOSPITAL ENCOUNTER (OUTPATIENT)
Dept: PULMONOLOGY | Age: 58
Discharge: HOME | End: 2023-08-03
Payer: COMMERCIAL

## 2023-08-03 VITALS — HEART RATE: 100 BPM | RESPIRATION RATE: 16 BRPM

## 2023-08-03 DIAGNOSIS — R06.00 DYSPNEA, UNSPECIFIED TYPE: ICD-10-CM

## 2023-08-03 LAB
DLCO %PRED: NORMAL
DLCO PRED: NORMAL
DLCO/VA %PRED: NORMAL
DLCO/VA PRED: NORMAL
DLCO/VA: NORMAL
DLCO: NORMAL
EXPIRATORY TIME-POST: NORMAL
EXPIRATORY TIME: NORMAL
FEF 25-75% %CHNG: NORMAL
FEF 25-75% %PRED-POST: NORMAL
FEF 25-75% %PRED-PRE: NORMAL
FEF 25-75% PRED: NORMAL
FEF 25-75%-POST: NORMAL
FEF 25-75%-PRE: NORMAL
FEV1 %PRED-POST: NORMAL
FEV1 %PRED-PRE: NORMAL
FEV1 PRED: NORMAL
FEV1-POST: NORMAL
FEV1-PRE: NORMAL
FEV1/FVC %PRED-POST: NORMAL
FEV1/FVC %PRED-PRE: NORMAL
FEV1/FVC PRED: NORMAL
FEV1/FVC-POST: NORMAL
FEV1/FVC-PRE: NORMAL
FVC %PRED-POST: NORMAL
FVC %PRED-PRE: NORMAL
FVC PRED: NORMAL
FVC-POST: NORMAL
FVC-PRE: NORMAL
GAW %PRED: NORMAL
GAW PRED: NORMAL
GAW: NORMAL
IC %PRED: NORMAL
IC PRED: NORMAL
IC: NORMAL
MEP: NORMAL
MIP: NORMAL
MVV %PRED-PRE: NORMAL
MVV PRED: NORMAL
MVV-PRE: NORMAL
PEF %PRED-POST: NORMAL
PEF %PRED-PRE: NORMAL
PEF PRED: NORMAL
PEF%CHNG: NORMAL
PEF-POST: NORMAL
PEF-PRE: NORMAL
RAW %PRED: NORMAL
RAW PRED: NORMAL
RAW: NORMAL
RV %PRED: NORMAL
RV PRED: NORMAL
RV: NORMAL
SVC %PRED: NORMAL
SVC PRED: NORMAL
SVC: NORMAL
TLC %PRED: NORMAL
TLC PRED: NORMAL
TLC: NORMAL
VA %PRED: NORMAL
VA PRED: NORMAL
VA: NORMAL
VTG %PRED: NORMAL
VTG PRED: NORMAL
VTG: NORMAL

## 2023-08-03 PROCEDURE — 94060 EVALUATION OF WHEEZING: CPT

## 2023-08-03 PROCEDURE — 6370000000 HC RX 637 (ALT 250 FOR IP): Performed by: INTERNAL MEDICINE

## 2023-08-03 PROCEDURE — 94640 AIRWAY INHALATION TREATMENT: CPT

## 2023-08-03 PROCEDURE — 94729 DIFFUSING CAPACITY: CPT

## 2023-08-03 PROCEDURE — 94726 PLETHYSMOGRAPHY LUNG VOLUMES: CPT

## 2023-08-03 RX ORDER — ALBUTEROL SULFATE 90 UG/1
4 AEROSOL, METERED RESPIRATORY (INHALATION) ONCE
Status: COMPLETED | OUTPATIENT
Start: 2023-08-03 | End: 2023-08-03

## 2023-08-03 RX ADMIN — ALBUTEROL SULFATE 4 PUFF: 90 AEROSOL, METERED RESPIRATORY (INHALATION) at 09:52

## 2023-08-06 ASSESSMENT — ENCOUNTER SYMPTOMS
ALLERGIC/IMMUNOLOGIC NEGATIVE: 1
GASTROINTESTINAL NEGATIVE: 1
RESPIRATORY NEGATIVE: 1
EYES NEGATIVE: 1

## 2023-08-07 ENCOUNTER — TELEPHONE (OUTPATIENT)
Dept: INTERNAL MEDICINE | Age: 58
End: 2023-08-07

## 2023-08-08 ENCOUNTER — HOSPITAL ENCOUNTER (OUTPATIENT)
Dept: BONE DENSITY | Age: 58
Discharge: HOME | End: 2023-08-10
Payer: COMMERCIAL

## 2023-08-08 DIAGNOSIS — M85.88 OSTEOPENIA OF LUMBAR SPINE: ICD-10-CM

## 2023-08-08 PROCEDURE — 77080 DXA BONE DENSITY AXIAL: CPT

## 2023-08-10 LAB — CYTOLOGY REPORT: NORMAL

## 2024-01-11 ENCOUNTER — OFFICE VISIT (OUTPATIENT)
Dept: PRIMARY CARE CLINIC | Age: 59
End: 2024-01-11
Payer: COMMERCIAL

## 2024-01-11 VITALS
WEIGHT: 176 LBS | BODY MASS INDEX: 31.18 KG/M2 | DIASTOLIC BLOOD PRESSURE: 88 MMHG | HEIGHT: 63 IN | HEART RATE: 92 BPM | TEMPERATURE: 99.6 F | SYSTOLIC BLOOD PRESSURE: 138 MMHG | OXYGEN SATURATION: 98 %

## 2024-01-11 DIAGNOSIS — R52 BODY ACHES: ICD-10-CM

## 2024-01-11 DIAGNOSIS — J02.9 SORE THROAT: ICD-10-CM

## 2024-01-11 DIAGNOSIS — J06.9 VIRAL UPPER RESPIRATORY TRACT INFECTION: Primary | ICD-10-CM

## 2024-01-11 LAB
INFLUENZA A ANTIGEN, POC: NEGATIVE
INFLUENZA B ANTIGEN, POC: NEGATIVE
LOT EXPIRE DATE: NORMAL
LOT KIT NUMBER: NORMAL
S PYO AG THROAT QL: NORMAL
SARS-COV-2, POC: NORMAL
VALID INTERNAL CONTROL: NORMAL
VENDOR AND KIT NAME POC: NORMAL

## 2024-01-11 PROCEDURE — G8484 FLU IMMUNIZE NO ADMIN: HCPCS

## 2024-01-11 PROCEDURE — 87428 SARSCOV & INF VIR A&B AG IA: CPT

## 2024-01-11 PROCEDURE — 87880 STREP A ASSAY W/OPTIC: CPT

## 2024-01-11 PROCEDURE — 99213 OFFICE O/P EST LOW 20 MIN: CPT

## 2024-01-11 PROCEDURE — G8417 CALC BMI ABV UP PARAM F/U: HCPCS

## 2024-01-11 PROCEDURE — 1036F TOBACCO NON-USER: CPT

## 2024-01-11 PROCEDURE — 3017F COLORECTAL CA SCREEN DOC REV: CPT

## 2024-01-11 PROCEDURE — G8427 DOCREV CUR MEDS BY ELIG CLIN: HCPCS

## 2024-01-11 RX ORDER — BENZONATATE 100 MG/1
100 CAPSULE ORAL 3 TIMES DAILY PRN
Qty: 21 CAPSULE | Refills: 0 | Status: SHIPPED | OUTPATIENT
Start: 2024-01-11 | End: 2024-01-18

## 2024-01-11 ASSESSMENT — PATIENT HEALTH QUESTIONNAIRE - PHQ9
1. LITTLE INTEREST OR PLEASURE IN DOING THINGS: 0
SUM OF ALL RESPONSES TO PHQ QUESTIONS 1-9: 0
SUM OF ALL RESPONSES TO PHQ9 QUESTIONS 1 & 2: 0
2. FEELING DOWN, DEPRESSED OR HOPELESS: 0

## 2024-01-11 ASSESSMENT — ENCOUNTER SYMPTOMS
SORE THROAT: 1
SINUS PAIN: 0
DIARRHEA: 0
NAUSEA: 0
RHINORRHEA: 0
COUGH: 1

## 2024-01-11 NOTE — PROGRESS NOTES
[facility] department of [facility]  [address] [facility] [address]  Phone: [phone]  Fax: [phone]      Suyapa Cheng is a 58 y.o. female who presents to the [facility] today for her medical conditions/complaints as noted below. Suyapa Cheng is c/o of URI (Pt ill since Sunday with cough, sore throat, body aches and chills. )          HPI:     URI   This is a new problem. The current episode started in the past 7 days (x4 days). The problem has been gradually worsening. The maximum temperature recorded prior to her arrival was 100.4 - 100.9 F. Associated symptoms include congestion, coughing and a sore throat. Pertinent negatives include no diarrhea, headaches, nausea, rhinorrhea or sinus pain. Treatments tried: vitamin C, airborne, zycam, advil, chloraseptic spray. The treatment provided mild relief.       Past Medical History:   Diagnosis Date    Acid reflux     Anemia     COVID-19     GERD (gastroesophageal reflux disease)     Hx of migraine headaches     Menopausal symptoms     Menorrhagia         No Known Allergies    Wt Readings from Last 3 Encounters:   01/11/24 79.8 kg (176 lb)   07/31/23 75.2 kg (165 lb 12.8 oz)   07/18/23 74.2 kg (163 lb 9.6 oz)     BP Readings from Last 3 Encounters:   01/11/24 138/88   07/31/23 136/88   07/18/23 124/84      Temp Readings from Last 3 Encounters:   01/11/24 99.6 °F (37.6 °C) (Tympanic)   02/22/23 97.1 °F (36.2 °C) (Tympanic)   10/21/22 97.6 °F (36.4 °C) (Temporal)     Pulse Readings from Last 3 Encounters:   01/11/24 92   08/03/23 100   07/31/23 79     SpO2 Readings from Last 3 Encounters:   01/11/24 98%   07/31/23 96%   07/18/23 96%       Subjective:      Review of Systems   Constitutional:  Positive for appetite change, chills, fatigue and fever.   HENT:  Positive for congestion and sore throat. Negative for rhinorrhea and sinus pain.    Respiratory:  Positive for cough.    Gastrointestinal:  Negative

## 2024-01-11 NOTE — PATIENT INSTRUCTIONS
Increase amount of fluids (water) you are drinking- atleast 64oz a day  I recommended alternating tylenol and ibuprofen for pain, and eating popsicles and jello for comfort. Warm salt water gargles.  Follow up with PCP if symptoms not improved or worsen.  Tessalon for cough three times a day

## 2024-05-07 ENCOUNTER — OFFICE VISIT (OUTPATIENT)
Dept: CARDIOLOGY | Age: 59
End: 2024-05-07
Payer: COMMERCIAL

## 2024-05-07 ENCOUNTER — HOSPITAL ENCOUNTER (EMERGENCY)
Age: 59
Discharge: HOME | End: 2024-05-07
Attending: EMERGENCY MEDICINE
Payer: COMMERCIAL

## 2024-05-07 ENCOUNTER — APPOINTMENT (OUTPATIENT)
Dept: GENERAL RADIOLOGY | Age: 59
End: 2024-05-07
Payer: COMMERCIAL

## 2024-05-07 VITALS
RESPIRATION RATE: 16 BRPM | BODY MASS INDEX: 30.12 KG/M2 | HEART RATE: 80 BPM | SYSTOLIC BLOOD PRESSURE: 153 MMHG | HEIGHT: 63 IN | TEMPERATURE: 98.1 F | DIASTOLIC BLOOD PRESSURE: 87 MMHG | OXYGEN SATURATION: 99 % | WEIGHT: 170 LBS

## 2024-05-07 VITALS
BODY MASS INDEX: 30.11 KG/M2 | WEIGHT: 170 LBS | DIASTOLIC BLOOD PRESSURE: 80 MMHG | SYSTOLIC BLOOD PRESSURE: 138 MMHG | HEART RATE: 72 BPM | OXYGEN SATURATION: 97 %

## 2024-05-07 DIAGNOSIS — I20.9 ANGINA PECTORIS (HCC): ICD-10-CM

## 2024-05-07 DIAGNOSIS — R00.2 PALPITATIONS: ICD-10-CM

## 2024-05-07 DIAGNOSIS — R06.02 SHORTNESS OF BREATH: ICD-10-CM

## 2024-05-07 DIAGNOSIS — R00.2 PALPITATIONS: Primary | ICD-10-CM

## 2024-05-07 DIAGNOSIS — E66.09 CLASS 1 OBESITY DUE TO EXCESS CALORIES WITH SERIOUS COMORBIDITY IN ADULT, UNSPECIFIED BMI: ICD-10-CM

## 2024-05-07 DIAGNOSIS — R94.31 ABNORMAL ECG: ICD-10-CM

## 2024-05-07 DIAGNOSIS — I20.0 UNSTABLE ANGINA PECTORIS (HCC): ICD-10-CM

## 2024-05-07 DIAGNOSIS — R06.02 SOB (SHORTNESS OF BREATH): ICD-10-CM

## 2024-05-07 DIAGNOSIS — I24.9 ACUTE CORONARY SYNDROME (HCC): ICD-10-CM

## 2024-05-07 DIAGNOSIS — I20.9 TYPICAL ANGINA (HCC): ICD-10-CM

## 2024-05-07 DIAGNOSIS — R94.31 ABNORMAL ELECTROCARDIOGRAPHY: Primary | ICD-10-CM

## 2024-05-07 PROBLEM — E66.811 CLASS 1 OBESITY DUE TO EXCESS CALORIES WITH SERIOUS COMORBIDITY IN ADULT: Status: ACTIVE | Noted: 2024-05-07

## 2024-05-07 LAB
ALBUMIN SERPL-MCNC: 4.6 G/DL (ref 3.5–5.2)
ALBUMIN/GLOB SERPL: 2.2 {RATIO} (ref 1–2.5)
ALP SERPL-CCNC: 83 U/L (ref 35–104)
ALT SERPL-CCNC: 15 U/L (ref 5–33)
AMYLASE SERPL-CCNC: 62 U/L (ref 28–100)
ANION GAP SERPL CALCULATED.3IONS-SCNC: 9 MMOL/L (ref 9–17)
AST SERPL-CCNC: 19 U/L
BASOPHILS # BLD: <0.03 K/UL (ref 0–0.2)
BASOPHILS NFR BLD: 0 % (ref 0–2)
BILIRUB DIRECT SERPL-MCNC: 0.1 MG/DL
BILIRUB INDIRECT SERPL-MCNC: 0.5 MG/DL (ref 0–1)
BILIRUB SERPL-MCNC: 0.6 MG/DL (ref 0.3–1.2)
BUN SERPL-MCNC: 12 MG/DL (ref 6–20)
BUN/CREAT SERPL: 17 (ref 9–20)
CALCIUM SERPL-MCNC: 9.9 MG/DL (ref 8.6–10.4)
CHLORIDE SERPL-SCNC: 104 MMOL/L (ref 98–107)
CO2 SERPL-SCNC: 28 MMOL/L (ref 20–31)
CREAT SERPL-MCNC: 0.7 MG/DL (ref 0.5–0.9)
EOSINOPHIL # BLD: 0.19 K/UL (ref 0–0.44)
EOSINOPHILS RELATIVE PERCENT: 3 % (ref 1–4)
ERYTHROCYTE [DISTWIDTH] IN BLOOD BY AUTOMATED COUNT: 12.7 % (ref 11.8–14.4)
GFR, ESTIMATED: >90 ML/MIN/1.73M2
GLOBULIN SER CALC-MCNC: 2.1 G/DL (ref 1.5–3.8)
GLUCOSE SERPL-MCNC: 117 MG/DL (ref 70–99)
HCT VFR BLD AUTO: 42.8 % (ref 36.3–47.1)
HGB BLD-MCNC: 14.7 G/DL (ref 11.9–15.1)
IMM GRANULOCYTES # BLD AUTO: <0.03 K/UL (ref 0–0.3)
IMM GRANULOCYTES NFR BLD: 0 %
LIPASE SERPL-CCNC: 33 U/L (ref 13–60)
LYMPHOCYTES NFR BLD: 1.54 K/UL (ref 1.1–3.7)
LYMPHOCYTES RELATIVE PERCENT: 27 % (ref 24–43)
MCH RBC QN AUTO: 31.2 PG (ref 25.2–33.5)
MCHC RBC AUTO-ENTMCNC: 34.3 G/DL (ref 25.2–33.5)
MCV RBC AUTO: 90.9 FL (ref 82.6–102.9)
MONOCYTES NFR BLD: 0.29 K/UL (ref 0.1–1.2)
MONOCYTES NFR BLD: 5 % (ref 3–12)
NEUTROPHILS NFR BLD: 65 % (ref 36–65)
NEUTS SEG NFR BLD: 3.57 K/UL (ref 1.5–8.1)
NRBC BLD-RTO: 0 PER 100 WBC
PLATELET # BLD AUTO: 188 K/UL (ref 138–453)
PMV BLD AUTO: 10.9 FL (ref 8.1–13.5)
POTASSIUM SERPL-SCNC: 3.9 MMOL/L (ref 3.7–5.3)
PROT SERPL-MCNC: 6.7 G/DL (ref 6.4–8.3)
RBC # BLD AUTO: 4.71 M/UL (ref 3.95–5.11)
SODIUM SERPL-SCNC: 141 MMOL/L (ref 135–144)
TROPONIN I SERPL HS-MCNC: <6 NG/L (ref 0–14)
TSH SERPL DL<=0.05 MIU/L-ACNC: 1.2 UIU/ML (ref 0.3–5)
WBC OTHER # BLD: 5.6 K/UL (ref 3.5–11.3)

## 2024-05-07 PROCEDURE — G8417 CALC BMI ABV UP PARAM F/U: HCPCS | Performed by: INTERNAL MEDICINE

## 2024-05-07 PROCEDURE — 84484 ASSAY OF TROPONIN QUANT: CPT

## 2024-05-07 PROCEDURE — 71045 X-RAY EXAM CHEST 1 VIEW: CPT

## 2024-05-07 PROCEDURE — 93005 ELECTROCARDIOGRAM TRACING: CPT | Performed by: EMERGENCY MEDICINE

## 2024-05-07 PROCEDURE — 80076 HEPATIC FUNCTION PANEL: CPT

## 2024-05-07 PROCEDURE — 85025 COMPLETE CBC W/AUTO DIFF WBC: CPT

## 2024-05-07 PROCEDURE — G8427 DOCREV CUR MEDS BY ELIG CLIN: HCPCS | Performed by: INTERNAL MEDICINE

## 2024-05-07 PROCEDURE — 83690 ASSAY OF LIPASE: CPT

## 2024-05-07 PROCEDURE — 82150 ASSAY OF AMYLASE: CPT

## 2024-05-07 PROCEDURE — 84443 ASSAY THYROID STIM HORMONE: CPT

## 2024-05-07 PROCEDURE — 99285 EMERGENCY DEPT VISIT HI MDM: CPT

## 2024-05-07 PROCEDURE — 99214 OFFICE O/P EST MOD 30 MIN: CPT | Performed by: INTERNAL MEDICINE

## 2024-05-07 PROCEDURE — 80048 BASIC METABOLIC PNL TOTAL CA: CPT

## 2024-05-07 PROCEDURE — 1036F TOBACCO NON-USER: CPT | Performed by: INTERNAL MEDICINE

## 2024-05-07 PROCEDURE — 3017F COLORECTAL CA SCREEN DOC REV: CPT | Performed by: INTERNAL MEDICINE

## 2024-05-07 ASSESSMENT — PAIN DESCRIPTION - PAIN TYPE: TYPE: ACUTE PAIN

## 2024-05-07 ASSESSMENT — PAIN - FUNCTIONAL ASSESSMENT
PAIN_FUNCTIONAL_ASSESSMENT: 0-10
PAIN_FUNCTIONAL_ASSESSMENT: NONE - DENIES PAIN

## 2024-05-07 ASSESSMENT — PAIN SCALES - GENERAL: PAINLEVEL_OUTOF10: 5

## 2024-05-07 ASSESSMENT — PAIN DESCRIPTION - LOCATION: LOCATION: CHEST

## 2024-05-07 ASSESSMENT — PAIN DESCRIPTION - DESCRIPTORS: DESCRIPTORS: SORE

## 2024-05-07 ASSESSMENT — PAIN DESCRIPTION - ORIENTATION: ORIENTATION: LEFT

## 2024-05-07 ASSESSMENT — LIFESTYLE VARIABLES
HOW OFTEN DO YOU HAVE A DRINK CONTAINING ALCOHOL: 2-3 TIMES A WEEK
HOW MANY STANDARD DRINKS CONTAINING ALCOHOL DO YOU HAVE ON A TYPICAL DAY: 3 OR 4

## 2024-05-07 NOTE — ED PROVIDER NOTES
[facility]  [address] [facility] [place] [address]  Phone: [phone]  EMERGENCY DEPARTMENT ENCOUNTER      Pt Name: Suyapa Cheng  MRN: [id number]  Birthdate 1965  Date of evaluation: 2024    CHIEF COMPLAINT       Chief Complaint   Patient presents with    Palpitations     intermittant over last 2 weeks       HISTORY OF PRESENT ILLNESS    Suyapa Cheng is a 58 y.o. female who presents to the emergency department complaining of just over 2 weeks of intermittent palpitations where she describes her heart flipping a.  Sometimes episodes last for few moments sometimes they last all day.  Sometimes she breaks out in a sweat with them and feels short of breath.  She denies any chest pain she does have occasional soreness left anterolateral chest but none currently.  On arrival he denies any symptoms.  No recent illnesses.  No fevers chills cough or congestion.    These episodes happen at varying times during the day.  They occur sometimes when she is eating and sometimes when she is in the shower.  She says that it is better when she exercises.    She does drink daily    REVIEW OF SYSTEMS       Constitutional: No fevers or chills   HENT: No sore throat, rhinorrhea, or earache   Eyes: No blurry vision or double vision no drainage   Cardiovascular: No chest pain or tachycardia positive palpitations  Respiratory: No wheezing or shortness of breath no cough   Gastrointestinal: No nausea, vomiting, diarrhea, constipation, or abdominal pain   : No hematuria or dysuria   Musculoskeletal: No swelling or pain   Skin: No rash   Neurological: No focal neurologic complaints, paresthesias, weakness, or headache     PAST MEDICAL HISTORY    has a past medical history of Acid reflux, Anemia, COVID-19, GERD (gastroesophageal reflux disease), Hx of migraine headaches, Menopausal symptoms, and Menorrhagia.    SURGICAL HISTORY      has a past surgical history that includes  section; Colonoscopy

## 2024-05-07 NOTE — PROGRESS NOTES
Cardiology Consultation  [facility]      24      CC & HPI:    Suyapa Cheng is stable from a cardiac standpoint, but feeling worse. Good functional capacity with significant decline in functional capacity due to chest pain and dyspnea on exertion. Resolves with rest. Worse with exertion. Has not been able to do as much as before.   No PND, no syncope or pre-syncope, no orthopnea.       Past Medical History:   Diagnosis Date    Acid reflux     Anemia     COVID-19     GERD (gastroesophageal reflux disease)     Hx of migraine headaches     Menopausal symptoms     Menorrhagia        Past Surgical History:   Procedure Laterality Date    BREAST SURGERY Right 2014    right breast biopsy negative ?Dr. Guzmán     SECTION      COLONOSCOPY  2010    HERNIA REPAIR Bilateral 10/21/2022    Laparoscopic Robotic Assisted Bilateral Inguinal Hernia Repair with mesh performed by Jovany Lindsey DO at [facility]    UPPER GASTROINTESTINAL ENDOSCOPY  ,     neg    WISDOM TOOTH EXTRACTION         Family History   Problem Relation Age of Onset    Heart Disease Paternal Grandfather 82    Heart Disease Paternal Grandmother     Other Maternal Grandfather         dementia  bio grandmother    Heart Disease Maternal Grandfather     Hypertension Father     Elevated Lipids Father     Other Father         pulmonary fibrosis    Lung Cancer Father     High Blood Pressure Father     Arthritis Mother         one knee replaced    High Cholesterol Mother         manages mostly with diet    Miscarriages / Stillbirths Mother         stillbirth at 37 weeks    Heart Disease Mother     High Blood Pressure Mother     Cancer Mother         skin CA    Hypertension Brother     Cancer Brother         kidney    Hypertension Brother     Asthma Brother     Other Brother         bicuspid aortic valve    Heart Murmur Brother     Other Daughter         lymphatoid papulosus    Other Son         lymphomatoid papullosis

## 2024-05-07 NOTE — DISCHARGE INSTRUCTIONS
Schedule an appoint with cardiology today may need outpatient Holter monitor    Return immediately if any worsening symptoms or any other concerns    Please understand that early in the process of an illness or injury, an emergency department workup can be falsely reassuring.      Tell us how we did visit: [email or web address]   and let us know about your experience

## 2024-05-10 LAB
EKG ATRIAL RATE: 79 BPM
EKG P AXIS: 52 DEGREES
EKG P-R INTERVAL: 168 MS
EKG Q-T INTERVAL: 430 MS
EKG QRS DURATION: 98 MS
EKG QTC CALCULATION (BAZETT): 493 MS
EKG R AXIS: -14 DEGREES
EKG T AXIS: 59 DEGREES
EKG VENTRICULAR RATE: 79 BPM

## 2024-05-22 ENCOUNTER — HOSPITAL ENCOUNTER (OUTPATIENT)
Age: 59
Discharge: HOME | End: 2024-05-24
Attending: INTERNAL MEDICINE
Payer: COMMERCIAL

## 2024-05-22 ENCOUNTER — HOSPITAL ENCOUNTER (OUTPATIENT)
Dept: NUCLEAR MEDICINE | Age: 59
Discharge: HOME | End: 2024-05-24
Attending: INTERNAL MEDICINE
Payer: COMMERCIAL

## 2024-05-22 ENCOUNTER — TELEPHONE (OUTPATIENT)
Dept: CARDIOLOGY | Age: 59
End: 2024-05-22

## 2024-05-22 VITALS
WEIGHT: 170 LBS | HEART RATE: 73 BPM | DIASTOLIC BLOOD PRESSURE: 65 MMHG | SYSTOLIC BLOOD PRESSURE: 122 MMHG | HEIGHT: 63 IN | BODY MASS INDEX: 30.12 KG/M2

## 2024-05-22 DIAGNOSIS — I24.9 ACUTE CORONARY SYNDROME (HCC): ICD-10-CM

## 2024-05-22 DIAGNOSIS — R06.02 SHORTNESS OF BREATH: ICD-10-CM

## 2024-05-22 DIAGNOSIS — R06.02 SOB (SHORTNESS OF BREATH): ICD-10-CM

## 2024-05-22 DIAGNOSIS — I20.9 TYPICAL ANGINA (HCC): ICD-10-CM

## 2024-05-22 DIAGNOSIS — R00.2 PALPITATIONS: ICD-10-CM

## 2024-05-22 DIAGNOSIS — R94.31 ABNORMAL ELECTROCARDIOGRAPHY: ICD-10-CM

## 2024-05-22 DIAGNOSIS — E66.09 CLASS 1 OBESITY DUE TO EXCESS CALORIES WITH SERIOUS COMORBIDITY IN ADULT, UNSPECIFIED BMI: ICD-10-CM

## 2024-05-22 DIAGNOSIS — I20.0 UNSTABLE ANGINA PECTORIS (HCC): ICD-10-CM

## 2024-05-22 DIAGNOSIS — I20.9 ANGINA PECTORIS (HCC): ICD-10-CM

## 2024-05-22 DIAGNOSIS — R94.31 ABNORMAL ECG: ICD-10-CM

## 2024-05-22 LAB
ECHO AO ASC DIAM: 3 CM
ECHO AO ASCENDING AORTA INDEX: 1.67 CM/M2
ECHO AO ROOT DIAM: 3.2 CM
ECHO AO ROOT INDEX: 1.78 CM/M2
ECHO AV AREA PEAK VELOCITY: 2.2 CM2
ECHO AV AREA/BSA PEAK VELOCITY: 1.2 CM2/M2
ECHO AV PEAK GRADIENT: 5 MMHG
ECHO AV PEAK VELOCITY: 1.2 M/S
ECHO AV VELOCITY RATIO: 0.67
ECHO BSA: 1.85 M2
ECHO BSA: 1.85 M2
ECHO EST RA PRESSURE: 3 MMHG
ECHO LA AREA 4C: 14.1 CM2
ECHO LA DIAMETER INDEX: 1.78 CM/M2
ECHO LA DIAMETER: 3.2 CM
ECHO LA MAJOR AXIS: 4.2 CM
ECHO LA TO AORTIC ROOT RATIO: 1
ECHO LA VOL MOD A4C: 37 ML (ref 22–52)
ECHO LA VOLUME INDEX MOD A4C: 21 ML/M2 (ref 16–34)
ECHO LV E' LATERAL VELOCITY: 10 CM/S
ECHO LV E' SEPTAL VELOCITY: 7 CM/S
ECHO LV EDV A2C: 64 ML
ECHO LV EDV A4C: 60 ML
ECHO LV EDV INDEX A4C: 33 ML/M2
ECHO LV EDV NDEX A2C: 36 ML/M2
ECHO LV EJECTION FRACTION A2C: 57 %
ECHO LV EJECTION FRACTION A4C: 58 %
ECHO LV ESV A2C: 27 ML
ECHO LV ESV A4C: 25 ML
ECHO LV ESV INDEX A2C: 15 ML/M2
ECHO LV ESV INDEX A4C: 14 ML/M2
ECHO LV FRACTIONAL SHORTENING: 30 % (ref 28–44)
ECHO LV INTERNAL DIMENSION DIASTOLE INDEX: 2.44 CM/M2
ECHO LV INTERNAL DIMENSION DIASTOLIC: 4.4 CM (ref 3.9–5.3)
ECHO LV INTERNAL DIMENSION SYSTOLIC INDEX: 1.72 CM/M2
ECHO LV INTERNAL DIMENSION SYSTOLIC: 3.1 CM
ECHO LV ISOVOLUMETRIC RELAXATION TIME (IVRT): 92 MS
ECHO LV IVSD: 0.9 CM (ref 0.6–0.9)
ECHO LV MASS 2D: 137.8 G (ref 67–162)
ECHO LV MASS INDEX 2D: 76.5 G/M2 (ref 43–95)
ECHO LV POSTERIOR WALL DIASTOLIC: 1 CM (ref 0.6–0.9)
ECHO LV RELATIVE WALL THICKNESS RATIO: 0.45
ECHO LVOT AREA: 3.1 CM2
ECHO LVOT DIAM: 2 CM
ECHO LVOT PEAK GRADIENT: 3 MMHG
ECHO LVOT PEAK VELOCITY: 0.8 M/S
ECHO MV A VELOCITY: 0.82 M/S
ECHO MV E DECELERATION TIME (DT): 134 MS
ECHO MV E VELOCITY: 0.89 M/S
ECHO MV E/A RATIO: 1.09
ECHO MV E/E' LATERAL: 8.9
ECHO MV E/E' RATIO (AVERAGED): 10.81
ECHO MV E/E' SEPTAL: 12.71
ECHO MV MAX VELOCITY: 1 M/S
ECHO MV PEAK GRADIENT: 4 MMHG
ECHO PV MAX VELOCITY: 0.7 M/S
ECHO PV PEAK GRADIENT: 2 MMHG
ECHO TV PEAK GRADIENT: 2 MMHG
NUC STRESS EJECTION FRACTION: 63 %
STRESS BASELINE DIAS BP: 89 MMHG
STRESS BASELINE HR: 64 BPM
STRESS BASELINE SYS BP: 139 MMHG
STRESS ESTIMATED WORKLOAD: 10.1 METS
STRESS PEAK DIAS BP: 85 MMHG
STRESS PEAK SYS BP: 186 MMHG
STRESS PERCENT HR ACHIEVED: 87 %
STRESS POST PEAK HR: 141 BPM
STRESS RATE PRESSURE PRODUCT: NORMAL BPM*MMHG
STRESS ST DEPRESSION: 2 MM
STRESS TARGET HR: 162 BPM
TID: 0.97

## 2024-05-22 PROCEDURE — 3430000000 HC RX DIAGNOSTIC RADIOPHARMACEUTICAL: Performed by: INTERNAL MEDICINE

## 2024-05-22 PROCEDURE — 93016 CV STRESS TEST SUPVJ ONLY: CPT | Performed by: INTERNAL MEDICINE

## 2024-05-22 PROCEDURE — 93306 TTE W/DOPPLER COMPLETE: CPT | Performed by: INTERNAL MEDICINE

## 2024-05-22 PROCEDURE — 78452 HT MUSCLE IMAGE SPECT MULT: CPT | Performed by: INTERNAL MEDICINE

## 2024-05-22 PROCEDURE — 93018 CV STRESS TEST I&R ONLY: CPT | Performed by: INTERNAL MEDICINE

## 2024-05-22 PROCEDURE — 93306 TTE W/DOPPLER COMPLETE: CPT

## 2024-05-22 PROCEDURE — 93017 CV STRESS TEST TRACING ONLY: CPT

## 2024-05-22 PROCEDURE — 78452 HT MUSCLE IMAGE SPECT MULT: CPT

## 2024-05-22 PROCEDURE — A9500 TC99M SESTAMIBI: HCPCS | Performed by: INTERNAL MEDICINE

## 2024-05-22 RX ORDER — TETRAKIS(2-METHOXYISOBUTYLISOCYANIDE)COPPER(I) TETRAFLUOROBORATE 1 MG/ML
10 INJECTION, POWDER, LYOPHILIZED, FOR SOLUTION INTRAVENOUS
Status: COMPLETED | OUTPATIENT
Start: 2024-05-22 | End: 2024-05-22

## 2024-05-22 RX ORDER — TETRAKIS(2-METHOXYISOBUTYLISOCYANIDE)COPPER(I) TETRAFLUOROBORATE 1 MG/ML
30 INJECTION, POWDER, LYOPHILIZED, FOR SOLUTION INTRAVENOUS
Status: COMPLETED | OUTPATIENT
Start: 2024-05-22 | End: 2024-05-22

## 2024-05-22 RX ADMIN — Medication 30 MILLICURIE: at 11:05

## 2024-05-22 RX ADMIN — Medication 10 MILLICURIE: at 09:40

## 2024-05-24 ENCOUNTER — TELEPHONE (OUTPATIENT)
Dept: FAMILY MEDICINE CLINIC | Age: 59
End: 2024-05-24

## 2024-05-24 DIAGNOSIS — E78.5 DYSLIPIDEMIA: ICD-10-CM

## 2024-05-24 DIAGNOSIS — R73.01 IMPAIRED FASTING BLOOD SUGAR: Primary | ICD-10-CM

## 2024-05-24 DIAGNOSIS — E55.9 VITAMIN D DEFICIENCY: ICD-10-CM

## 2024-05-24 NOTE — TELEPHONE ENCOUNTER
Patient calling asking if she can have blood work done prior to her upcoming appt on 5/31/24. Please let patient know.

## 2024-05-28 ENCOUNTER — HOSPITAL ENCOUNTER (OUTPATIENT)
Age: 59
Discharge: HOME | End: 2024-05-30
Attending: INTERNAL MEDICINE
Payer: COMMERCIAL

## 2024-05-28 DIAGNOSIS — R00.2 PALPITATIONS: ICD-10-CM

## 2024-05-28 DIAGNOSIS — R06.02 SOB (SHORTNESS OF BREATH): ICD-10-CM

## 2024-05-28 DIAGNOSIS — I20.9 TYPICAL ANGINA (HCC): ICD-10-CM

## 2024-05-28 DIAGNOSIS — I24.9 ACUTE CORONARY SYNDROME (HCC): ICD-10-CM

## 2024-05-28 DIAGNOSIS — E66.09 CLASS 1 OBESITY DUE TO EXCESS CALORIES WITH SERIOUS COMORBIDITY IN ADULT, UNSPECIFIED BMI: ICD-10-CM

## 2024-05-28 DIAGNOSIS — R94.31 ABNORMAL ECG: ICD-10-CM

## 2024-05-28 DIAGNOSIS — R06.02 SHORTNESS OF BREATH: ICD-10-CM

## 2024-05-28 DIAGNOSIS — I20.0 UNSTABLE ANGINA PECTORIS (HCC): ICD-10-CM

## 2024-05-28 DIAGNOSIS — R94.31 ABNORMAL ELECTROCARDIOGRAPHY: ICD-10-CM

## 2024-05-28 DIAGNOSIS — I20.9 ANGINA PECTORIS (HCC): ICD-10-CM

## 2024-05-28 PROCEDURE — 93225 XTRNL ECG REC<48 HRS REC: CPT

## 2024-05-28 NOTE — TELEPHONE ENCOUNTER
Patient returned call to office. Patient informed of echo and stress test results. Pt was advised to keep all upcoming appts and call office with any problems or concerns. Patient verbalized understanding and had no further questions at the time.

## 2024-05-29 ENCOUNTER — HOSPITAL ENCOUNTER (OUTPATIENT)
Age: 59
Discharge: HOME | End: 2024-05-29
Payer: COMMERCIAL

## 2024-05-29 DIAGNOSIS — E55.9 VITAMIN D DEFICIENCY: ICD-10-CM

## 2024-05-29 DIAGNOSIS — E78.5 DYSLIPIDEMIA: ICD-10-CM

## 2024-05-29 DIAGNOSIS — R73.01 IMPAIRED FASTING BLOOD SUGAR: ICD-10-CM

## 2024-05-29 LAB
25(OH)D3 SERPL-MCNC: 29.4 NG/ML (ref 30–100)
ALBUMIN SERPL-MCNC: 4.6 G/DL (ref 3.5–5.2)
ALBUMIN/GLOB SERPL: 1.9 {RATIO} (ref 1–2.5)
ALP SERPL-CCNC: 88 U/L (ref 35–104)
ALT SERPL-CCNC: 32 U/L (ref 5–33)
ANION GAP SERPL CALCULATED.3IONS-SCNC: 13 MMOL/L (ref 9–17)
AST SERPL-CCNC: 36 U/L
BILIRUB SERPL-MCNC: 0.5 MG/DL (ref 0.3–1.2)
BUN SERPL-MCNC: 16 MG/DL (ref 6–20)
BUN/CREAT SERPL: 23 (ref 9–20)
CALCIUM SERPL-MCNC: 9.7 MG/DL (ref 8.6–10.4)
CHLORIDE SERPL-SCNC: 102 MMOL/L (ref 98–107)
CHOLEST SERPL-MCNC: 227 MG/DL (ref 0–199)
CHOLESTEROL/HDL RATIO: 3
CO2 SERPL-SCNC: 24 MMOL/L (ref 20–31)
CREAT SERPL-MCNC: 0.7 MG/DL (ref 0.5–0.9)
EST. AVERAGE GLUCOSE BLD GHB EST-MCNC: 100 MG/DL
GFR, ESTIMATED: >90 ML/MIN/1.73M2
GLUCOSE SERPL-MCNC: 103 MG/DL (ref 70–99)
HBA1C MFR BLD: 5.1 % (ref 4–6)
HDLC SERPL-MCNC: 78 MG/DL
LDLC SERPL CALC-MCNC: 127 MG/DL (ref 0–100)
POTASSIUM SERPL-SCNC: 5.1 MMOL/L (ref 3.7–5.3)
PROT SERPL-MCNC: 7 G/DL (ref 6.4–8.3)
SODIUM SERPL-SCNC: 139 MMOL/L (ref 135–144)
TRIGL SERPL-MCNC: 112 MG/DL
VLDLC SERPL CALC-MCNC: 22 MG/DL

## 2024-05-29 PROCEDURE — 82306 VITAMIN D 25 HYDROXY: CPT

## 2024-05-29 PROCEDURE — 80061 LIPID PANEL: CPT

## 2024-05-29 PROCEDURE — 80053 COMPREHEN METABOLIC PANEL: CPT

## 2024-05-29 PROCEDURE — 83036 HEMOGLOBIN GLYCOSYLATED A1C: CPT

## 2024-05-29 PROCEDURE — 36415 COLL VENOUS BLD VENIPUNCTURE: CPT

## 2024-05-31 ENCOUNTER — OFFICE VISIT (OUTPATIENT)
Dept: FAMILY MEDICINE CLINIC | Age: 59
End: 2024-05-31
Payer: COMMERCIAL

## 2024-05-31 VITALS
HEART RATE: 72 BPM | WEIGHT: 169 LBS | DIASTOLIC BLOOD PRESSURE: 70 MMHG | HEIGHT: 63 IN | BODY MASS INDEX: 29.95 KG/M2 | SYSTOLIC BLOOD PRESSURE: 124 MMHG

## 2024-05-31 DIAGNOSIS — E78.5 DYSLIPIDEMIA: ICD-10-CM

## 2024-05-31 DIAGNOSIS — Z12.11 ENCOUNTER FOR SCREENING COLONOSCOPY: ICD-10-CM

## 2024-05-31 DIAGNOSIS — E55.9 VITAMIN D DEFICIENCY: ICD-10-CM

## 2024-05-31 DIAGNOSIS — R07.9 CHEST PAIN, UNSPECIFIED TYPE: ICD-10-CM

## 2024-05-31 DIAGNOSIS — Z00.01 ANNUAL VISIT FOR GENERAL ADULT MEDICAL EXAMINATION WITH ABNORMAL FINDINGS: Primary | ICD-10-CM

## 2024-05-31 PROCEDURE — 99396 PREV VISIT EST AGE 40-64: CPT | Performed by: NURSE PRACTITIONER

## 2024-05-31 RX ORDER — SIMVASTATIN 10 MG
10 TABLET ORAL NIGHTLY
Qty: 90 TABLET | Refills: 1 | Status: SHIPPED | OUTPATIENT
Start: 2024-05-31

## 2024-05-31 RX ORDER — ESOMEPRAZOLE MAGNESIUM 20 MG/1
20 GRANULE, DELAYED RELEASE ORAL DAILY PRN
COMMUNITY

## 2024-05-31 ASSESSMENT — ENCOUNTER SYMPTOMS
CONSTIPATION: 0
SINUS PRESSURE: 0
CHEST TIGHTNESS: 0
SINUS PAIN: 0
ABDOMINAL DISTENTION: 0
WHEEZING: 0
SORE THROAT: 0
RHINORRHEA: 0
COUGH: 0
VOMITING: 0
NAUSEA: 0
BACK PAIN: 0
SHORTNESS OF BREATH: 0
COLOR CHANGE: 0
DIARRHEA: 0
ABDOMINAL PAIN: 0

## 2024-05-31 ASSESSMENT — PATIENT HEALTH QUESTIONNAIRE - PHQ9
2. FEELING DOWN, DEPRESSED OR HOPELESS: NOT AT ALL
SUM OF ALL RESPONSES TO PHQ9 QUESTIONS 1 & 2: 0
1. LITTLE INTEREST OR PLEASURE IN DOING THINGS: NOT AT ALL
SUM OF ALL RESPONSES TO PHQ QUESTIONS 1-9: 0

## 2024-05-31 NOTE — PROGRESS NOTES
[facility]  [address] [facility] [address]  Dept: [phone]  Dept Fax: [phone]  Loc: [phone]    Suyapa Cheng is a 58 y.o. female who presents today for her medical conditions/complaintsas noted below.  Suyapa Cheng is c/o of   Chief Complaint   Patient presents with    Annual Exam     HPI:     Patient here to reestablish care. Was previously a patient within this office seeing Nadine Flores who has since left. Here today for an annual exam. Overall is doing well. Report seeing cardiology - Dr. Cooper earlier this month after being in the ER for palpitations that made her \"heart feeling like it was flipping\". Reports also was having SOB and some soreness of the chest at the same time. Did have an abnormal EKG. Cardiology is currently in the process of working this up. Just completed holter monitor, which result is not back yet. Does report to a family history of cardiac issues especially cholesterol problems. Reports to being active, watching dietary intake. Did have labs drawn prior to today's appointment that she would like to review. Denies any other issues or concerns today. Does follow with GYN for woman's health needs.         Hemoglobin A1C (%)   Date Value   05/29/2024 5.1   04/27/2017 5.2             ( goal A1Cis < 7)   No components found for: \"LABMICR\"  No components found for: \"LDLCHOLESTEROL\", \"LDLCALC\"    (goal LDL is <100)   AST (U/L)   Date Value   05/29/2024 36 (H)     ALT (U/L)   Date Value   05/29/2024 32     BUN (mg/dL)   Date Value   05/29/2024 16     BP Readings from Last 3 Encounters:   05/31/24 124/70   05/22/24 122/65   05/07/24 (!) 153/87          (goal 120/80)    Past Medical History:   Diagnosis Date    Acid reflux     Anemia     COVID-19     GERD (gastroesophageal reflux disease)     Hx of migraine headaches     Menopausal symptoms     Menorrhagia       Past Surgical History:

## 2024-06-03 ENCOUNTER — TELEPHONE (OUTPATIENT)
Dept: CARDIOLOGY | Age: 59
End: 2024-06-03

## 2024-06-03 NOTE — TELEPHONE ENCOUNTER
Please review holter and advise    Interpretation Summary    Basic rhythm normal sinus rhythm  Minimum heart rate 47 bpm at 2:35 AM  Average 79 bpm, maximum 150 bpm at 4:04 AM  Total 7 APCs seen  Total 1 PVC seen  No SVT no VT

## 2024-06-04 NOTE — TELEPHONE ENCOUNTER
Patient notified of holter monitor results and recommendations per Dr JC Cooper. Pt verbalized understanding and had no further questions at the time.

## 2024-06-13 LAB — NONINV COLON CA DNA+OCC BLD SCRN STL QL: NEGATIVE

## 2024-08-02 ENCOUNTER — HOSPITAL ENCOUNTER (OUTPATIENT)
Dept: MAMMOGRAPHY | Age: 59
Discharge: HOME | End: 2024-08-02
Payer: COMMERCIAL

## 2024-08-02 DIAGNOSIS — Z12.31 SCREENING MAMMOGRAM FOR HIGH-RISK PATIENT: ICD-10-CM

## 2024-08-02 PROCEDURE — 77063 BREAST TOMOSYNTHESIS BI: CPT

## 2024-10-04 NOTE — TELEPHONE ENCOUNTER
Patient discharged from [facility] 9/11/21- Covid- 19 Deborah Delcid  Urology  [address] [address]  Phone: [phone]  Fax: [phone]  Follow Up Time:     She Orellana  Infectious Disease  [address]  [place] [address]  Phone: [phone]  Fax: [phone]  Follow Up Time:

## 2024-11-04 ENCOUNTER — OFFICE VISIT (OUTPATIENT)
Dept: FAMILY MEDICINE CLINIC | Age: 59
End: 2024-11-04
Payer: COMMERCIAL

## 2024-11-04 VITALS
DIASTOLIC BLOOD PRESSURE: 70 MMHG | WEIGHT: 170 LBS | HEIGHT: 63 IN | HEART RATE: 64 BPM | SYSTOLIC BLOOD PRESSURE: 132 MMHG | BODY MASS INDEX: 30.12 KG/M2

## 2024-11-04 DIAGNOSIS — S39.011A STRAIN OF ABDOMINAL WALL, INITIAL ENCOUNTER: Primary | ICD-10-CM

## 2024-11-04 PROCEDURE — G8427 DOCREV CUR MEDS BY ELIG CLIN: HCPCS | Performed by: NURSE PRACTITIONER

## 2024-11-04 PROCEDURE — 99213 OFFICE O/P EST LOW 20 MIN: CPT | Performed by: NURSE PRACTITIONER

## 2024-11-04 PROCEDURE — 1036F TOBACCO NON-USER: CPT | Performed by: NURSE PRACTITIONER

## 2024-11-04 PROCEDURE — G8484 FLU IMMUNIZE NO ADMIN: HCPCS | Performed by: NURSE PRACTITIONER

## 2024-11-04 PROCEDURE — 3017F COLORECTAL CA SCREEN DOC REV: CPT | Performed by: NURSE PRACTITIONER

## 2024-11-04 PROCEDURE — G8417 CALC BMI ABV UP PARAM F/U: HCPCS | Performed by: NURSE PRACTITIONER

## 2024-11-04 RX ORDER — PREDNISONE 20 MG/1
40 TABLET ORAL DAILY
Qty: 10 TABLET | Refills: 0 | Status: SHIPPED | OUTPATIENT
Start: 2024-11-04 | End: 2024-11-09

## 2024-11-04 SDOH — ECONOMIC STABILITY: INCOME INSECURITY: HOW HARD IS IT FOR YOU TO PAY FOR THE VERY BASICS LIKE FOOD, HOUSING, MEDICAL CARE, AND HEATING?: NOT VERY HARD

## 2024-11-04 SDOH — ECONOMIC STABILITY: FOOD INSECURITY: WITHIN THE PAST 12 MONTHS, THE FOOD YOU BOUGHT JUST DIDN'T LAST AND YOU DIDN'T HAVE MONEY TO GET MORE.: NEVER TRUE

## 2024-11-04 SDOH — ECONOMIC STABILITY: FOOD INSECURITY: WITHIN THE PAST 12 MONTHS, YOU WORRIED THAT YOUR FOOD WOULD RUN OUT BEFORE YOU GOT MONEY TO BUY MORE.: NEVER TRUE

## 2024-11-04 NOTE — PROGRESS NOTES
[facility]PART[facility] OF [facility]  [address] [facility] [address]  Dept: [phone]  Dept Fax: [phone]  Loc: [phone]    Suyapa Cheng is a 59 y.o. female who presents today for her medical conditions/complaintsas noted below.  Suyapa Cheng is c/o of   Chief Complaint   Patient presents with    Abdominal Pain     Right upper quadrant 1 week using tylenol and ibubrofen     HPI:     Patient present to the office for an acute visit. Has been having abdominal pain. Symptoms for about a week. Unchanged. C/o right side abdomen pain. Denies nausea, vomiting, dizziness, fever, chills, constipation, diarrhea. Movement makes pain worse. Nothing is making better. Treating with tylenol and ibuprofen with minimal relief.         Hemoglobin A1C (%)   Date Value   2024 5.1   2017 5.2             ( goal A1Cis < 7)   No components found for: \"LABMICR\"  No components found for: \"LDLCHOLESTEROL\", \"LDLCALC\"    (goal LDL is <100)   AST (U/L)   Date Value   2024 36 (H)     ALT (U/L)   Date Value   2024 32     BUN (mg/dL)   Date Value   2024 16     BP Readings from Last 3 Encounters:   24 132/70   24 124/70   24 122/65          (goal 120/80)    Past Medical History:   Diagnosis Date    Acid reflux     Anemia     COVID-19     GERD (gastroesophageal reflux disease)     Hx of migraine headaches     Menopausal symptoms     Menorrhagia       Past Surgical History:   Procedure Laterality Date    BREAST SURGERY Right 2014    right breast biopsy negative ?Dr. Guzmán     SECTION      COLONOSCOPY  2010    HERNIA REPAIR Bilateral 10/21/2022    Laparoscopic Robotic Assisted Bilateral Inguinal Hernia Repair with mesh performed by Jovany Lindsey DO at [facility] OR    UPPER GASTROINTESTINAL ENDOSCOPY  ,     neg    WISDOM TOOTH EXTRACTION         Family History   Problem Relation Age of Onset

## 2024-11-05 PROBLEM — U07.1 COVID-19: Status: RESOLVED | Noted: 2021-09-10 | Resolved: 2024-11-05

## 2024-11-08 ENCOUNTER — TELEPHONE (OUTPATIENT)
Dept: FAMILY MEDICINE CLINIC | Age: 59
End: 2024-11-08

## 2024-11-08 NOTE — TELEPHONE ENCOUNTER
request From: Suyapa Cheng     With Provider: TINO Brown CNP [[facility] [place]]     Preferred Date Range: 11/8/2024 - 11/14/2024     Preferred Times: Any Time     Reason for visit: Request an Appointment     Comments:  Finished the prescription, pain went from a 9 to a 7. What is the next step

## 2024-11-08 NOTE — TELEPHONE ENCOUNTER
A pulled muscle can take several weeks to improve, given it has improved some have her see how it does for the next 4-5 days and lets us know. She can use tylenol, ibuprofen. She can also use a heating pad.

## 2024-11-11 ENCOUNTER — OFFICE VISIT (OUTPATIENT)
Dept: CARDIOLOGY | Age: 59
End: 2024-11-11
Payer: COMMERCIAL

## 2024-11-11 VITALS
SYSTOLIC BLOOD PRESSURE: 138 MMHG | HEART RATE: 67 BPM | DIASTOLIC BLOOD PRESSURE: 92 MMHG | BODY MASS INDEX: 29.95 KG/M2 | WEIGHT: 169 LBS | HEIGHT: 63 IN

## 2024-11-11 DIAGNOSIS — R00.2 PALPITATIONS: ICD-10-CM

## 2024-11-11 DIAGNOSIS — E78.5 DYSLIPIDEMIA: ICD-10-CM

## 2024-11-11 DIAGNOSIS — R94.31 ABNORMAL ELECTROCARDIOGRAPHY: Primary | ICD-10-CM

## 2024-11-11 PROCEDURE — 93000 ELECTROCARDIOGRAM COMPLETE: CPT | Performed by: SURGERY

## 2024-11-11 PROCEDURE — G8427 DOCREV CUR MEDS BY ELIG CLIN: HCPCS | Performed by: SURGERY

## 2024-11-11 PROCEDURE — 1036F TOBACCO NON-USER: CPT | Performed by: SURGERY

## 2024-11-11 PROCEDURE — 99214 OFFICE O/P EST MOD 30 MIN: CPT | Performed by: SURGERY

## 2024-11-11 PROCEDURE — 3017F COLORECTAL CA SCREEN DOC REV: CPT | Performed by: SURGERY

## 2024-11-11 PROCEDURE — G8417 CALC BMI ABV UP PARAM F/U: HCPCS | Performed by: SURGERY

## 2024-11-11 PROCEDURE — G8484 FLU IMMUNIZE NO ADMIN: HCPCS | Performed by: SURGERY

## 2024-11-11 RX ORDER — ROSUVASTATIN CALCIUM 20 MG/1
20 TABLET, COATED ORAL DAILY
Qty: 90 TABLET | Refills: 1 | Status: SHIPPED | OUTPATIENT
Start: 2024-11-11

## 2024-11-11 RX ORDER — EZETIMIBE 10 MG/1
10 TABLET ORAL DAILY
Qty: 90 TABLET | Refills: 1 | Status: SHIPPED | OUTPATIENT
Start: 2024-11-11

## 2024-11-11 ASSESSMENT — ENCOUNTER SYMPTOMS
ABDOMINAL DISTENTION: 0
NAUSEA: 0
COLOR CHANGE: 0
VOMITING: 0
COUGH: 0
DIARRHEA: 0
WHEEZING: 0
CONSTIPATION: 0
CHEST TIGHTNESS: 0
ABDOMINAL PAIN: 1
SHORTNESS OF BREATH: 0

## 2024-11-11 NOTE — PROGRESS NOTES
Today's Date: 2024  Patient's Name: Suyapa Cheng  Patient's age: 59 y.o., 1965    Subjective:  The patient is a 59 y.o. year old, , female is in the office for F/U cardiac testing   denies  any chest pain or discomfort. No orthopnea or PND. Daisy any palpitation, dizziness or syncope.     Past Medical History:   has a past medical history of Acid reflux, Anemia, COVID-19, GERD (gastroesophageal reflux disease), Hx of migraine headaches, Menopausal symptoms, and Menorrhagia.    Past Surgical History:   has a past surgical history that includes  section; Colonoscopy (2010); Upper gastrointestinal endoscopy (, ); Breast surgery (Right, 2014); [place] tooth extraction; and hernia repair (Bilateral, 10/21/2022).    Home Medications:  Prior to Admission medications    Medication Sig Start Date End Date Taking? Authorizing Provider   simvastatin (ZOCOR) 10 MG tablet Take 1 tablet by mouth nightly 24   Emy Morton, APRN - CNP   VITAMIN D PO Take by mouth every other day    Clay Echeverria MD   esomeprazole Magnesium (NEXIUM) 20 MG PACK Take 1 packet by mouth daily as needed    Clay Echeverria MD   MAGNESIUM PO Take by mouth    Clay Echeverria MD   Multiple Vitamins-Minerals (THERAPEUTIC MULTIVITAMIN-MINERALS) tablet Take 1 tablet by mouth daily    Clay Echeverria MD   Probiotic Product (PROBIOTIC DAILY PO) Take by mouth     Clay Echeverria MD       Allergies:  Patient has no known allergies.    Social History:   reports that she has never smoked. She has never used smokeless tobacco. She reports current alcohol use of about 21.0 standard drinks of alcohol per week. She reports that she does not use drugs.    Review of Systems:  Constitutional: there has been no unanticipated weight loss. There's been No change in energy level, No change in activity level.     Eyes: No visual changes or diplopia. No scleral icterus.  ENT: No

## 2024-12-04 ENCOUNTER — PATIENT MESSAGE (OUTPATIENT)
Dept: FAMILY MEDICINE CLINIC | Age: 59
End: 2024-12-04

## 2024-12-05 NOTE — TELEPHONE ENCOUNTER
She wouldn't typically use both as they are both antivirals one is just topical and valtrex is a pill. Which one would she like?

## 2024-12-06 RX ORDER — PENCICLOVIR 10 MG/G
CREAM TOPICAL
Qty: 1.5 G | Refills: 1 | Status: SHIPPED | OUTPATIENT
Start: 2024-12-06 | End: 2024-12-10

## 2024-12-09 ENCOUNTER — TELEPHONE (OUTPATIENT)
Dept: FAMILY MEDICINE CLINIC | Age: 59
End: 2024-12-09

## 2024-12-09 NOTE — TELEPHONE ENCOUNTER
[employer] calling to let you know pt's PA for Denavir was denied, case #[id number], call back number is [phone]

## 2024-12-18 ENCOUNTER — OFFICE VISIT (OUTPATIENT)
Dept: OBGYN | Age: 59
End: 2024-12-18
Payer: COMMERCIAL

## 2024-12-18 VITALS
SYSTOLIC BLOOD PRESSURE: 118 MMHG | WEIGHT: 170.6 LBS | HEART RATE: 80 BPM | RESPIRATION RATE: 16 BRPM | BODY MASS INDEX: 30.23 KG/M2 | DIASTOLIC BLOOD PRESSURE: 78 MMHG | OXYGEN SATURATION: 98 % | HEIGHT: 63 IN

## 2024-12-18 DIAGNOSIS — E28.39 OVARIAN FAILURE DUE TO MENOPAUSE: ICD-10-CM

## 2024-12-18 DIAGNOSIS — Z01.419 WELL WOMAN EXAM WITH ROUTINE GYNECOLOGICAL EXAM: Primary | ICD-10-CM

## 2024-12-18 DIAGNOSIS — Z12.31 ENCOUNTER FOR SCREENING MAMMOGRAM FOR MALIGNANT NEOPLASM OF BREAST: ICD-10-CM

## 2024-12-18 DIAGNOSIS — M85.80 OSTEOPENIA, UNSPECIFIED LOCATION: ICD-10-CM

## 2024-12-18 PROCEDURE — G8484 FLU IMMUNIZE NO ADMIN: HCPCS | Performed by: OBSTETRICS & GYNECOLOGY

## 2024-12-18 PROCEDURE — 99396 PREV VISIT EST AGE 40-64: CPT | Performed by: OBSTETRICS & GYNECOLOGY

## 2024-12-18 RX ORDER — VALACYCLOVIR HYDROCHLORIDE 1 G/1
500 TABLET, FILM COATED ORAL 2 TIMES DAILY
COMMUNITY
Start: 2024-11-30

## 2024-12-18 NOTE — PROGRESS NOTES
[facility]  [facility]  [address] [facility] [address]  Phone: [phone]  Fax: [phone]    Nursing Intake GYN Preventative History Information  10/8/24            Last Pap Smear Date and Results:   Date: 07/31/2023  TZ present: Yes  Cytology Result: Neg  HPV Result: neg   LEEP History: No  Date: n/a      2.  Last Mammogram Date and Results:   Date: 08/02/2024  Results: Birads 2  Other: n/a Tyrer Cuzick Score: 6.54%        3.  Last Colonoscopy Date and Results:   Date: 06/07/2024 - [facility]  Results: Neg      4.  Last Bone Density Date and Results:   Date: 08/08/2023  Results: Osteopenia      Electronically signed by Pratibha Simeon LPN on 12/18/2024 at 8:21 AM    
and non-tender. There were good bowel sounds in all quadrants and there was no guarding, rebound or rigidity.  On evaluation there was no evidence of hepatosplenomegaly and there was no costal vertebral tristan tenderness bilaterally.  No hernias were appreciated.     Abdominal Scars: yes     Psych:  The patient had a normal Orientation to: Time, Place, Person, and Situation  There is no Mood / Affect changes    Breast:  (Chest)  normal appearance, no masses or tenderness, Inspection negative, No nipple retraction or dimpling, No nipple discharge or bleeding, No axillary or supraclavicular adenopathy, Normal to palpation without dominant masses, Taught monthly breast self examination  Self breast exams were reviewed in detail. Literature was given.    Pelvic Exam:  External genitalia: hair loss and fat pad loss  Urinary system: urethral meatus normal and  Bladder is smooth and NT  Vaginal: atrophic mucosa  Cervix: NO CMT  Adnexa: normal bimanual exam and non palpable  Uterus: normal single, nontender and anteverted    Rectal Exam:  exam declined by patient          Musculosk:  Normal Gait and station was noted.  Digits were evaluated without abnormal findings.  Range of motion, stability and strength were evaluated and found to be appropriate for the patients age.        ASSESSMENT:      59 y.o. Annual   Diagnosis Orders   1. Well woman exam with routine gynecological exam        2. Ovarian failure due to menopause  DEXA AXIAL SKELETON W VERTEBRAL FX ASST      3. Osteopenia, unspecified location  DEXA AXIAL SKELETON W VERTEBRAL FX ASST      4. Encounter for screening mammogram for malignant neoplasm of breast  REECE DIGITAL SCREEN W OR WO CAD BILATERAL             Chief Complaint   Patient presents with    Annual Exam          Past Medical History:   Diagnosis Date    Acid reflux     Anemia     COVID-19     GERD (gastroesophageal reflux disease)     Hx of migraine headaches     Menopausal symptoms

## 2025-01-09 ENCOUNTER — HOSPITAL ENCOUNTER (OUTPATIENT)
Dept: NUCLEAR MEDICINE | Age: 60
Discharge: HOME | End: 2025-01-11
Payer: COMMERCIAL

## 2025-01-09 ENCOUNTER — HOSPITAL ENCOUNTER (OUTPATIENT)
Age: 60
Discharge: HOME | End: 2025-01-11
Payer: COMMERCIAL

## 2025-01-09 ENCOUNTER — TELEPHONE (OUTPATIENT)
Dept: CARDIOLOGY | Age: 60
End: 2025-01-09

## 2025-01-09 DIAGNOSIS — R94.31 ABNORMAL ELECTROCARDIOGRAPHY: ICD-10-CM

## 2025-01-09 LAB
NUC STRESS EJECTION FRACTION: 64 %
STRESS BASELINE DIAS BP: 104 MMHG
STRESS BASELINE HR: 60 BPM
STRESS BASELINE SYS BP: 149 MMHG
STRESS ESTIMATED WORKLOAD: 1 METS
STRESS PEAK DIAS BP: 104 MMHG
STRESS PEAK SYS BP: 149 MMHG
STRESS PERCENT HR ACHIEVED: 65 %
STRESS POST PEAK HR: 104 BPM
STRESS RATE PRESSURE PRODUCT: NORMAL BPM*MMHG
STRESS TARGET HR: 161 BPM
TID: 1.05

## 2025-01-09 PROCEDURE — 6360000002 HC RX W HCPCS: Performed by: INTERNAL MEDICINE

## 2025-01-09 PROCEDURE — 3430000000 HC RX DIAGNOSTIC RADIOPHARMACEUTICAL: Performed by: SURGERY

## 2025-01-09 PROCEDURE — 93017 CV STRESS TEST TRACING ONLY: CPT

## 2025-01-09 PROCEDURE — 78452 HT MUSCLE IMAGE SPECT MULT: CPT

## 2025-01-09 PROCEDURE — 93018 CV STRESS TEST I&R ONLY: CPT | Performed by: INTERNAL MEDICINE

## 2025-01-09 PROCEDURE — A9500 TC99M SESTAMIBI: HCPCS | Performed by: SURGERY

## 2025-01-09 RX ORDER — TETRAKIS(2-METHOXYISOBUTYLISOCYANIDE)COPPER(I) TETRAFLUOROBORATE 1 MG/ML
10 INJECTION, POWDER, LYOPHILIZED, FOR SOLUTION INTRAVENOUS
Status: COMPLETED | OUTPATIENT
Start: 2025-01-09 | End: 2025-01-09

## 2025-01-09 RX ORDER — TETRAKIS(2-METHOXYISOBUTYLISOCYANIDE)COPPER(I) TETRAFLUOROBORATE 1 MG/ML
30 INJECTION, POWDER, LYOPHILIZED, FOR SOLUTION INTRAVENOUS
Status: COMPLETED | OUTPATIENT
Start: 2025-01-09 | End: 2025-01-09

## 2025-01-09 RX ORDER — REGADENOSON 0.08 MG/ML
0.4 INJECTION, SOLUTION INTRAVENOUS ONCE
Status: COMPLETED | OUTPATIENT
Start: 2025-01-09 | End: 2025-01-09

## 2025-01-09 RX ADMIN — Medication 10 MILLICURIE: at 08:45

## 2025-01-09 RX ADMIN — Medication 30 MILLICURIE: at 09:40

## 2025-01-09 RX ADMIN — REGADENOSON 0.4 MG: 0.08 INJECTION, SOLUTION INTRAVENOUS at 09:39

## 2025-01-09 NOTE — TELEPHONE ENCOUNTER
Interpretation Summary      Stress Combined Conclusion: The study is negative for myocardial ischemia and infarction. Findings suggest a low risk of cardiac events.    Stress Function: Left ventricular function post-stress is normal. Post-stress ejection fraction is 64%.    Perfusion Comments: Prone images were obtained. Prone imaging was helpful in correcting soft tissue attenuation. LV perfusion is normal. There is no evidence of inducible ischemia.    Perfusion Conclusion: TID ratio is 1.05.    ECG: Resting ECG demonstrates normal sinus rhythm.    ECG: The stress ECG was negative for ischemia.    Stress Test: A pharmacological stress test was performed using regadenoson (Lexiscan).    Resting ECG: The ECG shows sinus rhythm. Resting ECG shows non-specific ST-segment abnormalities.    Stress ECG: Non-specific ST abnormality noted. The stress ECG was negative for ischemia.

## 2025-02-17 ENCOUNTER — OFFICE VISIT (OUTPATIENT)
Dept: CARDIOLOGY | Age: 60
End: 2025-02-17
Payer: COMMERCIAL

## 2025-02-17 VITALS
DIASTOLIC BLOOD PRESSURE: 82 MMHG | WEIGHT: 168 LBS | BODY MASS INDEX: 29.77 KG/M2 | RESPIRATION RATE: 14 BRPM | SYSTOLIC BLOOD PRESSURE: 138 MMHG | HEIGHT: 63 IN | HEART RATE: 61 BPM

## 2025-02-17 DIAGNOSIS — R00.2 PALPITATIONS: ICD-10-CM

## 2025-02-17 DIAGNOSIS — Z12.31 ENCOUNTER FOR SCREENING MAMMOGRAM FOR MALIGNANT NEOPLASM OF BREAST: ICD-10-CM

## 2025-02-17 DIAGNOSIS — E78.5 DYSLIPIDEMIA: Primary | ICD-10-CM

## 2025-02-17 DIAGNOSIS — E28.39 OVARIAN FAILURE DUE TO MENOPAUSE: ICD-10-CM

## 2025-02-17 DIAGNOSIS — M85.80 OSTEOPENIA, UNSPECIFIED LOCATION: ICD-10-CM

## 2025-02-17 PROCEDURE — 3017F COLORECTAL CA SCREEN DOC REV: CPT | Performed by: SURGERY

## 2025-02-17 PROCEDURE — 99214 OFFICE O/P EST MOD 30 MIN: CPT | Performed by: SURGERY

## 2025-02-17 PROCEDURE — 93000 ELECTROCARDIOGRAM COMPLETE: CPT | Performed by: SURGERY

## 2025-02-17 PROCEDURE — 1036F TOBACCO NON-USER: CPT | Performed by: SURGERY

## 2025-02-17 PROCEDURE — G8417 CALC BMI ABV UP PARAM F/U: HCPCS | Performed by: SURGERY

## 2025-02-17 PROCEDURE — G8427 DOCREV CUR MEDS BY ELIG CLIN: HCPCS | Performed by: SURGERY

## 2025-02-17 NOTE — PROGRESS NOTES
Today's Date: 2025  Patient's Name: Suyapa Cheng  Patient's age: 59 y.o., 1965    Subjective:  The patient is a 59 y.o. year old, , female is in the office for F/U cardiac testing   Getting over cold symptoms /virus ? A week ago denies  any chest pain or discomfort.  Past Medical History:   has a past medical history of Acid reflux, Anemia, COVID-19, GERD (gastroesophageal reflux disease), Hx of migraine headaches, Menopausal symptoms, and Menorrhagia.    Past Surgical History:   has a past surgical history that includes  section; Colonoscopy (2010); Upper gastrointestinal endoscopy (, ); Breast surgery (Right, 2014); [place] tooth extraction; and hernia repair (Bilateral, 10/21/2022).    Home Medications:  Prior to Admission medications    Medication Sig Start Date End Date Taking? Authorizing Provider   valACYclovir (VALTREX) 1 g tablet Take 0.5 tablets by mouth 2 times daily 24  Yes Clay Echeverria MD   Cyanocobalamin (VITAMIN B12) 1000 MCG TBCR Take 1 tablet by mouth daily   Yes Clay Echeverria MD   rosuvastatin (CRESTOR) 20 MG tablet Take 1 tablet by mouth daily 24  Yes Sergio Silver APRN - NP   ezetimibe (ZETIA) 10 MG tablet Take 1 tablet by mouth daily 24  Yes Sergio Silver APRN - NP   VITAMIN D PO Take 1 capsule by mouth daily   Yes Clay Echeverria MD   esomeprazole Magnesium (NEXIUM) 20 MG PACK Take 1 packet by mouth daily as needed   Yes Clay Echeverria MD   MAGNESIUM PO Take 1 tablet by mouth daily gummies   Yes Clay Echeverria MD   Multiple Vitamins-Minerals (THERAPEUTIC MULTIVITAMIN-MINERALS) tablet Take 1 tablet by mouth daily   Yes Clay Echeverria MD   Probiotic Product (PROBIOTIC DAILY PO) Take 1 capsule by mouth daily   Yes Clay Echeverria MD   valACYclovir (VALTREX) 1 g tablet Take 2 tablets by mouth 2 times daily as needed (cold sores)  Patient not taking: Reported on 2025

## 2025-03-13 ENCOUNTER — HOSPITAL ENCOUNTER (OUTPATIENT)
Age: 60
Discharge: HOME | End: 2025-03-13
Payer: COMMERCIAL

## 2025-03-13 DIAGNOSIS — R94.31 ABNORMAL ELECTROCARDIOGRAPHY: ICD-10-CM

## 2025-03-13 DIAGNOSIS — E78.5 DYSLIPIDEMIA: ICD-10-CM

## 2025-03-13 LAB
CHOLEST SERPL-MCNC: 157 MG/DL (ref 0–199)
CHOLESTEROL/HDL RATIO: 2.2
HDLC SERPL-MCNC: 73 MG/DL
LDLC SERPL CALC-MCNC: 72 MG/DL (ref 0–100)
TRIGL SERPL-MCNC: 58 MG/DL
VLDLC SERPL CALC-MCNC: 12 MG/DL (ref 1–30)

## 2025-03-13 PROCEDURE — 80061 LIPID PANEL: CPT

## 2025-03-13 PROCEDURE — 36415 COLL VENOUS BLD VENIPUNCTURE: CPT

## 2025-08-12 ENCOUNTER — OFFICE VISIT (OUTPATIENT)
Dept: FAMILY MEDICINE CLINIC | Age: 60
End: 2025-08-12
Payer: COMMERCIAL

## 2025-08-12 VITALS
DIASTOLIC BLOOD PRESSURE: 86 MMHG | SYSTOLIC BLOOD PRESSURE: 116 MMHG | OXYGEN SATURATION: 100 % | HEART RATE: 77 BPM | WEIGHT: 168 LBS | HEIGHT: 63 IN | BODY MASS INDEX: 29.77 KG/M2

## 2025-08-12 DIAGNOSIS — Z00.01 ANNUAL VISIT FOR GENERAL ADULT MEDICAL EXAMINATION WITH ABNORMAL FINDINGS: Primary | ICD-10-CM

## 2025-08-12 DIAGNOSIS — E66.3 OVERWEIGHT (BMI 25.0-29.9): ICD-10-CM

## 2025-08-12 DIAGNOSIS — E55.9 VITAMIN D DEFICIENCY: ICD-10-CM

## 2025-08-12 DIAGNOSIS — R00.2 PALPITATIONS: ICD-10-CM

## 2025-08-12 DIAGNOSIS — E78.5 DYSLIPIDEMIA: ICD-10-CM

## 2025-08-12 DIAGNOSIS — R73.01 IMPAIRED FASTING BLOOD SUGAR: ICD-10-CM

## 2025-08-12 PROCEDURE — 99396 PREV VISIT EST AGE 40-64: CPT | Performed by: NURSE PRACTITIONER

## 2025-08-12 RX ORDER — PROPRANOLOL HYDROCHLORIDE 10 MG/1
10 TABLET ORAL DAILY
Qty: 30 TABLET | Refills: 0 | Status: SHIPPED | OUTPATIENT
Start: 2025-08-12

## 2025-08-12 SDOH — ECONOMIC STABILITY: FOOD INSECURITY: WITHIN THE PAST 12 MONTHS, THE FOOD YOU BOUGHT JUST DIDN'T LAST AND YOU DIDN'T HAVE MONEY TO GET MORE.: NEVER TRUE

## 2025-08-12 SDOH — ECONOMIC STABILITY: FOOD INSECURITY: WITHIN THE PAST 12 MONTHS, YOU WORRIED THAT YOUR FOOD WOULD RUN OUT BEFORE YOU GOT MONEY TO BUY MORE.: NEVER TRUE

## 2025-08-12 ASSESSMENT — PATIENT HEALTH QUESTIONNAIRE - PHQ9
SUM OF ALL RESPONSES TO PHQ QUESTIONS 1-9: 0
SUM OF ALL RESPONSES TO PHQ QUESTIONS 1-9: 0
1. LITTLE INTEREST OR PLEASURE IN DOING THINGS: NOT AT ALL
2. FEELING DOWN, DEPRESSED OR HOPELESS: NOT AT ALL
SUM OF ALL RESPONSES TO PHQ QUESTIONS 1-9: 0
SUM OF ALL RESPONSES TO PHQ QUESTIONS 1-9: 0

## 2025-08-12 ASSESSMENT — ENCOUNTER SYMPTOMS
VOMITING: 0
COUGH: 0
COLOR CHANGE: 0
ABDOMINAL PAIN: 0
ABDOMINAL DISTENTION: 0
SHORTNESS OF BREATH: 0
DIARRHEA: 0
CONSTIPATION: 0
CHEST TIGHTNESS: 0
WHEEZING: 0
NAUSEA: 0

## 2025-08-14 ENCOUNTER — HOSPITAL ENCOUNTER (OUTPATIENT)
Dept: MAMMOGRAPHY | Age: 60
Discharge: HOME | End: 2025-08-16
Attending: OBSTETRICS & GYNECOLOGY
Payer: COMMERCIAL

## 2025-08-14 ENCOUNTER — HOSPITAL ENCOUNTER (OUTPATIENT)
Dept: LAB | Age: 60
Discharge: HOME | End: 2025-08-14
Payer: COMMERCIAL

## 2025-08-14 ENCOUNTER — HOSPITAL ENCOUNTER (OUTPATIENT)
Dept: BONE DENSITY | Age: 60
Discharge: HOME | End: 2025-08-16
Attending: OBSTETRICS & GYNECOLOGY
Payer: COMMERCIAL

## 2025-08-14 VITALS — WEIGHT: 170 LBS | HEIGHT: 63 IN | BODY MASS INDEX: 30.12 KG/M2

## 2025-08-14 DIAGNOSIS — R73.01 IMPAIRED FASTING BLOOD SUGAR: ICD-10-CM

## 2025-08-14 DIAGNOSIS — E28.39 OVARIAN FAILURE DUE TO MENOPAUSE: ICD-10-CM

## 2025-08-14 DIAGNOSIS — E55.9 VITAMIN D DEFICIENCY: ICD-10-CM

## 2025-08-14 DIAGNOSIS — M85.80 OSTEOPENIA, UNSPECIFIED LOCATION: ICD-10-CM

## 2025-08-14 DIAGNOSIS — Z12.31 ENCOUNTER FOR SCREENING MAMMOGRAM FOR MALIGNANT NEOPLASM OF BREAST: ICD-10-CM

## 2025-08-14 LAB
25(OH)D3 SERPL-MCNC: 38.1 NG/ML (ref 30–100)
ALBUMIN SERPL-MCNC: 4.5 G/DL (ref 3.5–5.2)
ALBUMIN/GLOB SERPL: 2.1 {RATIO} (ref 1–2.5)
ALP SERPL-CCNC: 80 U/L (ref 35–104)
ALT SERPL-CCNC: 23 U/L (ref 10–35)
ANION GAP SERPL CALCULATED.3IONS-SCNC: 9 MMOL/L (ref 9–16)
AST SERPL-CCNC: 25 U/L (ref 10–35)
BILIRUB SERPL-MCNC: 0.5 MG/DL (ref 0–1.2)
BUN SERPL-MCNC: 12 MG/DL (ref 6–20)
BUN/CREAT SERPL: 15 (ref 9–20)
CALCIUM SERPL-MCNC: 9.4 MG/DL (ref 8.6–10.4)
CHLORIDE SERPL-SCNC: 109 MMOL/L (ref 98–107)
CO2 SERPL-SCNC: 26 MMOL/L (ref 20–31)
CREAT SERPL-MCNC: 0.8 MG/DL (ref 0.6–0.9)
EST. AVERAGE GLUCOSE BLD GHB EST-MCNC: 103 MG/DL
GFR, ESTIMATED: 85 ML/MIN/1.73M2
GLUCOSE SERPL-MCNC: 101 MG/DL (ref 74–99)
HBA1C MFR BLD: 5.2 % (ref 4–6)
POTASSIUM SERPL-SCNC: 4.4 MMOL/L (ref 3.7–5.3)
PROT SERPL-MCNC: 6.6 G/DL (ref 6.6–8.7)
SODIUM SERPL-SCNC: 144 MMOL/L (ref 136–145)

## 2025-08-14 PROCEDURE — 80053 COMPREHEN METABOLIC PANEL: CPT

## 2025-08-14 PROCEDURE — 82306 VITAMIN D 25 HYDROXY: CPT

## 2025-08-14 PROCEDURE — 77067 SCR MAMMO BI INCL CAD: CPT

## 2025-08-14 PROCEDURE — 77080 DXA BONE DENSITY AXIAL: CPT

## 2025-08-14 PROCEDURE — 83036 HEMOGLOBIN GLYCOSYLATED A1C: CPT

## 2025-08-14 PROCEDURE — 36415 COLL VENOUS BLD VENIPUNCTURE: CPT

## (undated) DEVICE — GLOVE SURG SZ 6 THK91MIL LTX FREE SYN POLYISOPRENE ANTI

## (undated) DEVICE — ARM DRAPE

## (undated) DEVICE — INSUFFLATION NEEDLE TO ESTABLISH PNEUMOPERITONEUM.: Brand: INSUFFLATION NEEDLE

## (undated) DEVICE — BLANKET WRM W29.9XL79.1IN UP BODY FORC AIR MISTRAL-AIR

## (undated) DEVICE — GARMENT,MEDLINE,DVT,INT,CALF,MED, GEN2: Brand: MEDLINE

## (undated) DEVICE — BLADELESS OBTURATOR: Brand: WECK VISTA

## (undated) DEVICE — 4-PORT MANIFOLD: Brand: NEPTUNE 2

## (undated) DEVICE — SUTURE V-LOC 90 3-0 L9IN ABSRB VLT L26MM V-20 1/2 CIR TAPR VLOCM0644

## (undated) DEVICE — SOLUTION IV IRRIG POUR BRL 0.9% SODIUM CHL 2F7124

## (undated) DEVICE — KITTNER: Brand: DEROYAL

## (undated) DEVICE — BLADE ES ELASTOMERIC COAT INSUL DURABLE BEND UPTO 90DEG

## (undated) DEVICE — TOTAL TRAY, DB, 100% SILI FOLEY, 16FR 10: Brand: MEDLINE

## (undated) DEVICE — TIP COVER ACCESSORY

## (undated) DEVICE — SKIN AFFIX SURG ADHESIVE 72/CS 0.55ML: Brand: MEDLINE

## (undated) DEVICE — SUTURE MCRYL SZ 4-0 L18IN ABSRB UD L19MM PS-2 3/8 CIR PRIM Y496G

## (undated) DEVICE — SOLUTION ANTIFOG VIS SYS CLEARIFY LAPSCP

## (undated) DEVICE — GENERAL ROBOTIC PACK: Brand: MEDLINE INDUSTRIES, INC.

## (undated) DEVICE — PAD,ARMBOARD,CONV,FOAM,2X8X20",12PR/CS: Brand: MEDLINE

## (undated) DEVICE — CANNULA SEAL

## (undated) DEVICE — GLOVE ORANGE PI 7   MSG9070

## (undated) DEVICE — ANCHOR TISSUE RETRIEVAL SYSTEM, BAG SIZE 125 ML, PORT SIZE 8 MM: Brand: ANCHOR TISSUE RETRIEVAL SYSTEM

## (undated) DEVICE — PROCEDURE PACK TRENGUARD 450